# Patient Record
Sex: MALE | Race: WHITE | NOT HISPANIC OR LATINO | Employment: OTHER | URBAN - NONMETROPOLITAN AREA
[De-identification: names, ages, dates, MRNs, and addresses within clinical notes are randomized per-mention and may not be internally consistent; named-entity substitution may affect disease eponyms.]

---

## 2017-02-28 ENCOUNTER — CLINICAL SUPPORT (OUTPATIENT)
Dept: AUDIOLOGY | Facility: CLINIC | Age: 62
End: 2017-02-28

## 2017-02-28 ENCOUNTER — OFFICE VISIT (OUTPATIENT)
Dept: OTOLARYNGOLOGY | Facility: CLINIC | Age: 62
End: 2017-02-28

## 2017-02-28 VITALS — BODY MASS INDEX: 32.14 KG/M2 | HEIGHT: 66 IN | WEIGHT: 200 LBS

## 2017-02-28 DIAGNOSIS — H90.5 SENSORINEURAL HEARING LOSS OF RIGHT EAR: Primary | ICD-10-CM

## 2017-02-28 DIAGNOSIS — H90.3 ASYMMETRIC SNHL (SENSORINEURAL HEARING LOSS): Primary | ICD-10-CM

## 2017-02-28 DIAGNOSIS — H93.13 TINNITUS, BILATERAL: ICD-10-CM

## 2017-02-28 DIAGNOSIS — H61.21 IMPACTED CERUMEN OF RIGHT EAR: ICD-10-CM

## 2017-02-28 PROCEDURE — 69210 REMOVE IMPACTED EAR WAX UNI: CPT | Performed by: OTOLARYNGOLOGY

## 2017-02-28 PROCEDURE — 92557 COMPREHENSIVE HEARING TEST: CPT | Performed by: AUDIOLOGIST

## 2017-02-28 PROCEDURE — 99203 OFFICE O/P NEW LOW 30 MIN: CPT | Performed by: OTOLARYNGOLOGY

## 2017-02-28 PROCEDURE — 92567 TYMPANOMETRY: CPT | Performed by: AUDIOLOGIST

## 2017-02-28 NOTE — PROGRESS NOTES
STANDARD AUDIOMETRIC EVALUATION      Name:  Eliseo Infante  :  1955  Age:  61 y.o.  Date of Evaluation:  2017      HISTORY    Reason for visit:  Eliseo Infante is seen today for a hearing evaluation at the request of Dr. Braden Norwood.  Patient reports he lost hearing in his right ear several years ago due to a sudden hearing loss.  He has hears a low frequency hum in his right ear for years as well.  Recently, he has noticed a higher pitch sound like crickets in both ears.       EVALUATION    See Audiogram    RESULTS        Otoscopy and Tympanometry 226 Hz :  Right Ear:  Otoscopy:  Visible ear drum          Tympanometry:  Middle ear function within normal limits    Left Ear:   Otoscopy:  Clear ear canal        Tympanometry:  Middle ear function within normal limits    Test technique:  Standard Audiometry     Pure Tone Audiometry:   Patient responded to pure tones at  dB for 250-4000 Hz in right ear and at 5-35 dB for 250-8000 Hz in left ear.      Speech Audiometry:        Right Ear:  Speech Reception Threshold (SRT) was obtained at 75 dBHL, masked                 Speech Discrimination scores were 8% in masking noise when words were presented at 95 dBHL       Left Ear:  Speech Reception Threshold (SRT) was obtained at 10 dBHL                 Speech Discrimination scores were 100% in quiet when words were presented at 50 dBHL    Reliability:   good    IMPRESSIONS:  1.  Tympanometry results are consistent with Middle ear function within normal limits in both ears  2.  Pure tone results are consistent with mild to profound sloping sensorineural hearing loss  for right ear, and essentially within normal limits for left ear.        RECOMMENDATIONS:  Patient is seeing the Ear Nose and Throat physician immediately following this examination.  It was a pleasure seeing Eliseo Infante in Audiology today.  We would be happy to do further testing or discuss these test as necessary.          This  document has been electronically signed by Jennifer Cole MS CCC-A on February 28, 2017 5:08 PM       Jennifer Cole MS CCC-A  Licensed Audiologist

## 2017-02-28 NOTE — PROGRESS NOTES
Subjective   Eliseo Infante is a 61 y.o. male.     History of Present Illness   This is a patient have not seen in over 3 years.  He is status post nasal septoplasty and he states he is very pleased with the results of this.  He has a more remote history (greater than 20 years) history of right-sided hearing loss that was worked up elsewhere including an MRI scan and no etiology was found.  Returns today stating he's begun having some high-pitched ringing in the left ear more so than the right.  Has always had a low pitched noise in the right ear since he lost his hearing many years ago.  No otorrhea, no otalgia.  Nothing in particular seems to a broad this on.  Is worse in a quiet environment.      The following portions of the patient's history were reviewed and updated as appropriate: allergies, current medications, past family history, past medical history, past social history, past surgical history and problem list.      Eliseo Infante reports that he has quit smoking. He does not have any smokeless tobacco history on file.  Patient is not a tobacco user and has not been counseled for use of tobacco products    No family history on file.      Current Outpatient Prescriptions:   •  citalopram (CeleXA) 20 MG tablet, , Disp: , Rfl:   •  dextroamphetamine (DEXEDRINE SPANSULE) 15 MG 24 hr capsule, take 1 capsule by mouth twice a day, Disp: , Rfl: 0  •  latanoprost (XALATAN) 0.005 % ophthalmic solution, Administer 1 drop to both eyes Every Night., Disp: 2.5 mL, Rfl: 12  •  NAPROXEN  MG EC tablet, , Disp: , Rfl:   •  omeprazole (PriLOSEC) 40 MG capsule, , Disp: , Rfl: 0  •  ondansetron (ZOFRAN) 8 MG tablet, take 1 tablet by mouth three times a day if needed for nausea, Disp: , Rfl: 0  •  SUMAtriptan (IMITREX) 20 MG/ACT nasal spray, , Disp: , Rfl:   •  SUMAtriptan Succinate (IMITREX PO), Take  by mouth as needed., Disp: , Rfl:   •  verapamil PM (VERELAN PM) 360 MG 24 hr capsule, Take 360 mg by mouth  daily., Disp: , Rfl:       Past Medical History   Diagnosis Date   • High blood pressure    • Vitreous floater 05/24/2013         Review of Systems   Constitutional: Negative for fever.   HENT: Positive for hearing loss.    All other systems reviewed and are negative.          Objective   Physical Exam  General: Well-developed well-nourished male in no acute distress.  Alert and oriented ×3. Head: Normocephalic. Face: Symmetrical strength and appearance. PERRL. EOMI. Voice:Strong. Speech:Fluent  Ears: External ears no deformity, left ear canal shows no discharge.  Tympanic membrane intact and clear.  Right ear is occluded with cerumen.  Using the binocular microscope for visualization, cerumen impaction was removed from right ear canal(s) using instrumentation. This was personally performed by Braden Norwood MD   Following cerumen removal right tympanic membrane is noted be intact and clear  Nose: Nares show no discharge mass polyp or purulence.  Boggy mucosa is present.  No gross external deformity.  Septum: Midline  Oral cavity: Lips and gums without lesions.  Tongue and floor of mouth without lesions.  Parotid and submandibular ducts unobstructed.  No mucosal lesions on the buccal mucosa or vestibule of the mouth.  Pharynx: No erythema exudate mass or ulcer  Neck: No lymphadenopathy.  No thyromegaly.  Trachea and larynx midline.  No masses in the parotid or submandibular glands.    Audiogram is obtained and reviewed and shows a markedly asymmetrical sensorineural hearing loss with mild to profound loss on the right and an 8% discrimination score with a high frequency sensorineural loss at 6000 Hz only, on the left.  Discrimination is 100% on the left.  Tympanograms are type A.        Assessment/Plan   Eliseo was seen today for hearing loss.    Diagnoses and all orders for this visit:    Asymmetric SNHL (sensorineural hearing loss)    Impacted cerumen of right ear    Tinnitus, bilateral      Plan: Cerumen  removed as described above.  Explained the nature of tinnitus to the patient and its relationship to sensorineural hearing loss.  Advise use of masking noise as needed.  He should avoid unnecessary exposure loud noise use ear protection when unavoidably around loud noise and I advised return in a year with repeat audiogram call sooner for problems.

## 2017-06-01 ENCOUNTER — OFFICE VISIT (OUTPATIENT)
Dept: OPHTHALMOLOGY | Facility: CLINIC | Age: 62
End: 2017-06-01

## 2017-06-01 DIAGNOSIS — H11.153 PINGUECULA, BILATERAL: ICD-10-CM

## 2017-06-01 DIAGNOSIS — H26.9 CATARACT: ICD-10-CM

## 2017-06-01 DIAGNOSIS — H40.003 BORDERLINE GLAUCOMA, BILATERAL: Primary | ICD-10-CM

## 2017-06-01 PROCEDURE — 99213 OFFICE O/P EST LOW 20 MIN: CPT | Performed by: OPHTHALMOLOGY

## 2017-06-01 PROCEDURE — 92133 CPTRZD OPH DX IMG PST SGM ON: CPT | Performed by: OPHTHALMOLOGY

## 2017-06-01 NOTE — PROGRESS NOTES
Subjective   Eliseo Infante is a 61 y.o. male.   Chief Complaint   Patient presents with   • Glaucoma     Misses treatment infrequently less that 3 times per month.   Denies itching or burning or redness.      HPI     Glaucoma   In both eyes.  Side effects of treatment include growth of lashes. Additional comments: Misses treatment infrequently less that 3 times per month.   Denies itching or burning or redness.        Last edited by Luna Boland MD on 6/1/2017  9:39 AM. (History)          Review of Systems   Constitutional: Negative for chills and fever.   Respiratory: Negative for shortness of breath.    Cardiovascular: Negative for chest pain.   Gastrointestinal: Negative for abdominal pain.   Genitourinary: Negative for dysuria.   Musculoskeletal: Negative for myalgias.   Psychiatric/Behavioral: Negative for confusion.     The following portions of the patient’s history were reviewed and updated as appropriate: current medications, allergies, past medical and surgical history and social history.     Objective   Base Eye Exam     Visual Acuity (Snellen - Linear)      Right Left   Dist cc 20/25 20/50   Near cc J1 J1               Slit Lamp and Fundus Exam     External Exam      Right Left    External Normal Normal      Slit Lamp Exam      Right Left    Lids/Lashes Normal Normal    Conjunctiva/Sclera Pinguecula Pinguecula    Cornea Clear Clear    Anterior Chamber Deep and quiet Deep and quiet    Iris Round and reactive Round and reactive    Lens Nuclear sclerosis Nuclear sclerosis    Vitreous Normal Normal            Refraction     Wearing Rx      Sphere Cylinder Axis Add   Right -6.00 +0.50 039 +2.00   Left -6.75 +0.50 070 +2.00                 OCT Disc:   OD- superior thinning  OS- inferrior wedge thinning but overall full RNFL    Assessment/Plan   Diagnoses and all orders for this visit:    Borderline glaucoma, bilateral  Comments:  Patient is on Latanoprost OU for about 1 year. IOPs is 13 OU.  OCT  today shows some superior thinning OD but there is a defect as this was taken udilated. OS is relatively full and unchanged. IOPs are ok so continue current regimen. IF OCT remains stable consider stopping drops.     Cataract  Comments:  Not visually significant. Observe.     Pinguecula, bilateral  Comments:  AT QID OU.     Plan:       Return in about 6 months (around 12/1/2017) for Dilated exam, OCT discs.                            This document has been signed by Luna Boland MD on June 1, 2017 9:39 AM

## 2017-07-31 ENCOUNTER — OFFICE VISIT (OUTPATIENT)
Dept: ORTHOPEDIC SURGERY | Facility: CLINIC | Age: 62
End: 2017-07-31

## 2017-07-31 VITALS — BODY MASS INDEX: 31.34 KG/M2 | HEIGHT: 66 IN | WEIGHT: 195 LBS

## 2017-07-31 DIAGNOSIS — G56.03 BILATERAL CARPAL TUNNEL SYNDROME: ICD-10-CM

## 2017-07-31 DIAGNOSIS — M79.642 LEFT HAND PAIN: Primary | ICD-10-CM

## 2017-07-31 PROCEDURE — 99212 OFFICE O/P EST SF 10 MIN: CPT | Performed by: ORTHOPAEDIC SURGERY

## 2017-07-31 NOTE — PROGRESS NOTES
Eliseo Infante is a 61 y.o. male   Primary provider:  Manolo Sena MD       Chief Complaint   Patient presents with   • Left Hand - Establish Care       HISTORY OF PRESENT ILLNESS:    Hand Pain    Incident onset: for years. The incident occurred at home. Injury mechanism: playing piano. The pain is present in the left hand. Quality: sharp and dull. The pain does not radiate. The pain is moderate. The pain has been fluctuating since the incident. Associated symptoms include numbness and tingling. The symptoms are aggravated by movement. Treatments tried: stretching hand. The treatment provided mild relief.   Patient stated that he saw Dr. Baker about 10-12 years ago and he told him he had carpal tunnel but no electrical studies were done.  His symptomatology is consistent with carpal tunnel left hand only plan is bilateral nerve conduction studies for diagnostic purposes if approved carpal tunnel release we discussed the procedure being done in the hospital in the office and he prefers the office under local     CONCURRENT MEDICAL HISTORY:    Past Medical History:   Diagnosis Date   • High blood pressure    • Vitreous floater 05/24/2013       Allergies   Allergen Reactions   • Statins Other (See Comments)     Muscle pain         Current Outpatient Prescriptions:   •  citalopram (CeleXA) 20 MG tablet, , Disp: , Rfl:   •  dextroamphetamine (DEXEDRINE SPANSULE) 15 MG 24 hr capsule, take 1 capsule by mouth twice a day, Disp: , Rfl: 0  •  latanoprost (XALATAN) 0.005 % ophthalmic solution, Administer 1 drop to both eyes Every Night., Disp: 2.5 mL, Rfl: 12  •  NAPROXEN  MG EC tablet, , Disp: , Rfl:   •  ondansetron (ZOFRAN) 8 MG tablet, take 1 tablet by mouth three times a day if needed for nausea, Disp: , Rfl: 0  •  SUMAtriptan (IMITREX) 20 MG/ACT nasal spray, , Disp: , Rfl:   •  SUMAtriptan Succinate (IMITREX PO), Take  by mouth as needed., Disp: , Rfl:   •  verapamil PM (VERELAN PM) 360 MG 24 hr capsule,  "Take 360 mg by mouth daily., Disp: , Rfl:     Past Surgical History:   Procedure Laterality Date   • SEPTOPLASTY  12/18/2013    Nasal surgery procedure (Nasal septoplasty.)       Family History   Problem Relation Age of Onset   • Macular degeneration Mother    • Glaucoma Mother         Social History     Social History   • Marital status:      Spouse name: N/A   • Number of children: N/A   • Years of education: N/A     Occupational History   • Not on file.     Social History Main Topics   • Smoking status: Never Smoker   • Smokeless tobacco: Not on file   • Alcohol use Not on file   • Drug use: Not on file   • Sexual activity: Not on file     Other Topics Concern   • Not on file     Social History Narrative        Review of Systems   Musculoskeletal: Positive for joint swelling and neck pain.   Neurological: Positive for tingling, numbness and headaches.   All other systems reviewed and are negative.      PHYSICAL EXAMINATION:       Ht 66\" (167.6 cm)  Wt 195 lb (88.5 kg)  BMI 31.47 kg/m2    Physical Exam    GAIT:     [x]  Normal  []  Antalgic    Assistive device: [x]  None  []  Walker     []  Crutches  []  Cane     []  Wheelchair  []  Stretcher    Ortho Exam  his exam shows good muscle strength no evidence of deformity arthritis positive Tinel's at the wrist minimal no atrophy of the abductors    No results found.        ASSESSMENT:    Diagnoses and all orders for this visit:    Left hand pain          PLAN    No Follow-up on file.    Nathaniel Burns MD    "

## 2017-09-13 ENCOUNTER — OFFICE VISIT (OUTPATIENT)
Dept: ORTHOPEDIC SURGERY | Facility: CLINIC | Age: 62
End: 2017-09-13

## 2017-09-13 VITALS — WEIGHT: 201 LBS | HEIGHT: 66 IN | BODY MASS INDEX: 32.3 KG/M2

## 2017-09-13 DIAGNOSIS — M79.642 LEFT HAND PAIN: Primary | ICD-10-CM

## 2017-09-13 DIAGNOSIS — G56.03 BILATERAL CARPAL TUNNEL SYNDROME: ICD-10-CM

## 2017-09-13 PROCEDURE — 99213 OFFICE O/P EST LOW 20 MIN: CPT | Performed by: ORTHOPAEDIC SURGERY

## 2017-09-13 NOTE — PROGRESS NOTES
"Eliseo Infante is a 62 y.o. male returns for patient his EMG is positive for bilateral carpal tunnel syndrome but the right is asymptomatic so the left one is cemented symptomatic was to have this released and the office will get this precertified     Chief Complaint   Patient presents with   • Left Hand - Results     EMG Dr Tavarez  9/1/17       HISTORY OF PRESENT ILLNESS:  Left hand pain is worse than the right.  EMG Dr Tavarez 9/1/17.     CONCURRENT MEDICAL HISTORY:    Past Medical History:   Diagnosis Date   • High blood pressure    • Vitreous floater 05/24/2013       Allergies   Allergen Reactions   • Statins Other (See Comments)     Muscle pain         Current Outpatient Prescriptions:   •  citalopram (CeleXA) 20 MG tablet, , Disp: , Rfl:   •  dextroamphetamine (DEXEDRINE SPANSULE) 15 MG 24 hr capsule, take 1 capsule by mouth twice a day, Disp: , Rfl: 0  •  latanoprost (XALATAN) 0.005 % ophthalmic solution, Administer 1 drop to both eyes Every Night., Disp: 2.5 mL, Rfl: 12  •  NAPROXEN  MG EC tablet, , Disp: , Rfl:   •  ondansetron (ZOFRAN) 8 MG tablet, take 1 tablet by mouth three times a day if needed for nausea, Disp: , Rfl: 0  •  SUMAtriptan (IMITREX) 20 MG/ACT nasal spray, , Disp: , Rfl:   •  SUMAtriptan Succinate (IMITREX PO), Take  by mouth as needed., Disp: , Rfl:   •  verapamil PM (VERELAN PM) 360 MG 24 hr capsule, Take 360 mg by mouth daily., Disp: , Rfl:     Past Surgical History:   Procedure Laterality Date   • SEPTOPLASTY  12/18/2013    Nasal surgery procedure (Nasal septoplasty.)       ROS  No fevers or chills.  No chest pain or shortness of air.  No GI or  disturbances.    PHYSICAL EXAMINATION:       Ht 66\" (167.6 cm)  Wt 201 lb (91.2 kg)  BMI 32.44 kg/m2    Physical Exam    GAIT:     [x]  Normal  []  Antalgic    Assistive device: [x]  None  []  Walker     []  Crutches  []  Cane     []  Wheelchair  []  Stretcher    Ortho Exam positive Tinel's at the wrist full range of motion      EMG " Dr Jewell 9/1/17  Assessment:  Abnormal testing.  This test is most consistent with both upper extremity moderate stage carpal tunnel syndrome, right side more than left side with involvement of bilateral median motor sensory fiber at the level of the wrist.  There was no neurophysiologic evidence of bilateral upper extremity ulnar neuropathy.          ASSESSMENT:    Diagnoses and all orders for this visit:    Left hand pain    Bilateral carpal tunnel syndrome          PLAN    No Follow-up on file.    Nathaniel Burns MD

## 2018-04-06 ENCOUNTER — TELEPHONE (OUTPATIENT)
Dept: ORTHOPEDIC SURGERY | Facility: CLINIC | Age: 63
End: 2018-04-06

## 2019-04-08 RX ORDER — NAPROXEN 500 MG/1
500 TABLET ORAL 2 TIMES DAILY PRN
Qty: 60 TABLET | Refills: 2 | Status: SHIPPED | OUTPATIENT
Start: 2019-04-08 | End: 2019-05-30 | Stop reason: SDUPTHER

## 2019-05-28 DIAGNOSIS — M25.551 RIGHT HIP PAIN: Primary | ICD-10-CM

## 2019-05-30 ENCOUNTER — OFFICE VISIT (OUTPATIENT)
Dept: ORTHOPEDIC SURGERY | Facility: CLINIC | Age: 64
End: 2019-05-30

## 2019-05-30 VITALS — BODY MASS INDEX: 31.82 KG/M2 | HEIGHT: 66 IN | WEIGHT: 198 LBS

## 2019-05-30 DIAGNOSIS — M16.11 PRIMARY OSTEOARTHRITIS OF RIGHT HIP: Primary | ICD-10-CM

## 2019-05-30 DIAGNOSIS — M25.551 RIGHT HIP PAIN: ICD-10-CM

## 2019-05-30 PROCEDURE — 99214 OFFICE O/P EST MOD 30 MIN: CPT | Performed by: NURSE PRACTITIONER

## 2019-05-30 RX ORDER — NAPROXEN 500 MG/1
500 TABLET ORAL 2 TIMES DAILY PRN
Qty: 60 TABLET | Refills: 11 | Status: SHIPPED | OUTPATIENT
Start: 2019-05-30 | End: 2019-10-15 | Stop reason: HOSPADM

## 2019-05-30 NOTE — PROGRESS NOTES
Eliseo Infante is a 63 y.o. 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  He denies any previous injury.  He currently takes naproxen twice daily as needed for the discomfort which does improve his symptoms and has been performing yoga type exercises and is concerned that these may be the cause of his symptoms.    Pain   This is a new problem. Episode onset: 3 months. The problem occurs intermittently. The problem has been unchanged. The symptoms are aggravated by walking. He has tried NSAIDs for the symptoms. The treatment provided no relief.        CONCURRENT MEDICAL HISTORY:    Past Medical History:   Diagnosis Date   • High blood pressure    • Vitreous floater 05/24/2013       Allergies   Allergen Reactions   • Statins Other (See Comments)     Muscle pain         Current Outpatient Medications:   •  dextroamphetamine (DEXEDRINE SPANSULE) 15 MG 24 hr capsule, take 1 capsule by mouth twice a day, Disp: , Rfl: 0  •  latanoprost (XALATAN) 0.005 % ophthalmic solution, Administer 1 drop to both eyes Every Night., Disp: 2.5 mL, Rfl: 12  •  naproxen (EC NAPROSYN) 500 MG EC tablet, Take 1 tablet by mouth 2 (Two) Times a Day As Needed for Fever., Disp: 60 tablet, Rfl: 11  •  ondansetron (ZOFRAN) 8 MG tablet, take 1 tablet by mouth three times a day if needed for nausea, Disp: , Rfl: 0  •  SUMAtriptan (IMITREX) 20 MG/ACT nasal spray, , Disp: , Rfl:   •  verapamil PM (VERELAN PM) 360 MG 24 hr capsule, Take 360 mg by mouth daily., Disp: , Rfl:     Past Surgical History:   Procedure Laterality Date   • SEPTOPLASTY  12/18/2013    Nasal surgery procedure (Nasal septoplasty.)       Family History   Problem Relation Age of Onset   • Macular  "degeneration Mother    • Glaucoma Mother    • Heart disease Other    • Hypertension Other        Social History     Socioeconomic History   • Marital status:      Spouse name: Not on file   • Number of children: Not on file   • Years of education: Not on file   • Highest education level: Not on file   Tobacco Use   • Smoking status: Never Smoker        Review of Systems   All other systems reviewed and are negative.      PHYSICAL EXAMINATION:       Ht 167.6 cm (66\")   Wt 89.8 kg (198 lb)   BMI 31.96 kg/m²     Physical Exam   Constitutional: He is oriented to person, place, and time. Vital signs are normal. He appears well-developed and well-nourished. He is cooperative.   HENT:   Head: Normocephalic and atraumatic.   Neck: Trachea normal and phonation normal.   Pulmonary/Chest: Effort normal. No respiratory distress.   Abdominal: Soft. Normal appearance. He exhibits no distension.   Neurological: He is alert and oriented to person, place, and time. GCS eye subscore is 4. GCS verbal subscore is 5. GCS motor subscore is 6.   Skin: Skin is warm, dry and intact. Capillary refill takes less than 2 seconds.   Psychiatric: He has a normal mood and affect. His speech is normal and behavior is normal. Judgment and thought content normal. Cognition and memory are normal.   Vitals reviewed.      GAIT:     []  Normal  [x]  Antalgic    Assistive device: [x]  None  []  Walker     []  Crutches  []  Cane     []  Wheelchair  []  Stretcher    Right Hip Exam     Range of Motion   Abduction: abnormal   Flexion: abnormal   External rotation: abnormal   Internal rotation: abnormal     Muscle Strength   Abduction: 4/5   Adduction: 4/5   Flexion: 4/5     Other   Erythema: absent  Scars: absent  Sensation: normal  Pulse: present      Left Hip Exam   Left hip exam is normal.                  No results found.        ASSESSMENT:    Diagnoses and all orders for this visit:    Primary osteoarthritis of right hip    Right hip " pain    Other orders  -     naproxen (EC NAPROSYN) 500 MG EC tablet; Take 1 tablet by mouth 2 (Two) Times a Day As Needed for Fever.          PLAN  On review of the x-ray today there is progressive right hip osteoarthritis noted when compared to the left hip.  We discussed treatment for end-stage osteoarthritis of the right hip in detail and at this time the patient will continue to take his as needed dose of naproxen modify his activity continue his low impact exercises and follow-up as needed.  No Follow-up on file.    Brijesh Patterson, APRN     Ambulatory

## 2019-07-23 ENCOUNTER — OFFICE VISIT (OUTPATIENT)
Dept: ORTHOPEDIC SURGERY | Facility: CLINIC | Age: 64
End: 2019-07-23

## 2019-07-23 VITALS — WEIGHT: 204 LBS | BODY MASS INDEX: 32.78 KG/M2 | HEIGHT: 66 IN

## 2019-07-23 DIAGNOSIS — M16.11 PRIMARY OSTEOARTHRITIS OF RIGHT HIP: ICD-10-CM

## 2019-07-23 DIAGNOSIS — M25.551 RIGHT HIP PAIN: Primary | ICD-10-CM

## 2019-07-23 PROCEDURE — 99213 OFFICE O/P EST LOW 20 MIN: CPT | Performed by: ORTHOPAEDIC SURGERY

## 2019-07-23 NOTE — PROGRESS NOTES
"Eliseo Infante is a 63 y.o. male returns for     Chief Complaint   Patient presents with   • Right Hip - Follow-up       HISTORY OF PRESENT ILLNESS:Right hip pain.  Brijesh stated him on naproxen with some improvement. His pain score varies depending on what he is doing.   Occasional sharp stabbing pains.  He has consistent dull aches.  He has occasional sharp pains.  He is started using anti-inflammatory medicine which helps a little bit.  He has some difficulty putting on socks and shoes.  No specific injury and no numbness or tingling.       CONCURRENT MEDICAL HISTORY:    The following portions of the patient's history were reviewed and updated as appropriate: allergies, current medications, past family history, past medical history, past social history, past surgical history and problem list.     ROS  No fevers or chills.  No chest pain or shortness of air.  No GI or  disturbances.    PHYSICAL EXAMINATION:       Ht 167.6 cm (66\")   Wt 92.5 kg (204 lb)   BMI 32.93 kg/m²     Physical Exam   Constitutional: He is oriented to person, place, and time. He appears well-developed and well-nourished.   Neurological: He is alert and oriented to person, place, and time.   Psychiatric: He has a normal mood and affect. His behavior is normal. Judgment and thought content normal.       GAIT:     [x]  Normal  []  Antalgic    Assistive device: [x]  None  []  Walker     []  Crutches  []  Cane     []  Wheelchair  []  Stretcher    Right Hip Exam     Tenderness   The patient is experiencing no tenderness.     Range of Motion   Flexion: 110   External rotation: 20   Internal rotation: 0     Muscle Strength   Flexion: 4/5     Tests   BROOKLYNN: positive  Fadir:  Positive FADIR test    Other   Erythema: absent  Sensation: normal  Pulse: present                  Study Result     Ordering Provider:  Brijesh Patterson APRN  Ordering Diagnosis/Indication:  Right hip pain     Procedure:  XR HIP W OR WO PELVIS 2-3 VIEW RIGHT  Exam " Date:  5/30/19     COMPARISON:  Not applicable, no relevant images available.     IMPRESSION: AP of the pelvis with 2 views of the right hip reveals progressive degenerative changes of the right hip with no evidence of acute fracture, dislocation or other radiological abnormality noted.        Brijesh PÉREZCarolina Patterson, APRN  5/30/19           ASSESSMENT:    Diagnoses and all orders for this visit:    Right hip pain  -     Ambulatory Referral to Physical Therapy Evaluate and treat  -     CT Injection/Aspriation Large Joint or Bursa; Future    Primary osteoarthritis of right hip  -     Ambulatory Referral to Physical Therapy Evaluate and treat  -     CT Injection/Aspriation Large Joint or Bursa; Future          PLAN    Long discussion in regards to progression of disease expectations and surgical intervention.  We discussed treatment options leading up to surgical intervention.  He is going to use a cane in certain situations, begin physical therapy for range of motion and strengthening exercises for 2-3 visits.  We also discussed an intra-articular steroid injection.  Follow-up on an as-needed basis with eventual total hip arthroplasty.    Patient's Body mass index is 32.93 kg/m². BMI is above normal parameters. Recommendations include: exercise counseling and nutrition counseling.  Return if symptoms worsen or fail to improve, for recheck.    Nathaniel Kate MD

## 2019-07-29 ENCOUNTER — HOSPITAL ENCOUNTER (OUTPATIENT)
Dept: PHYSICAL THERAPY | Facility: HOSPITAL | Age: 64
Setting detail: THERAPIES SERIES
Discharge: HOME | End: 2019-07-29

## 2019-07-29 ENCOUNTER — HOSPITAL ENCOUNTER (OUTPATIENT)
Dept: CT IMAGING | Facility: HOSPITAL | Age: 64
Discharge: HOME | End: 2019-07-29
Admitting: RADIOLOGY

## 2019-07-29 VITALS
OXYGEN SATURATION: 97 % | HEART RATE: 71 BPM | DIASTOLIC BLOOD PRESSURE: 101 MMHG | TEMPERATURE: 98.6 F | RESPIRATION RATE: 18 BRPM | SYSTOLIC BLOOD PRESSURE: 204 MMHG

## 2019-07-29 DIAGNOSIS — M16.11 PRIMARY OSTEOARTHRITIS OF RIGHT HIP: ICD-10-CM

## 2019-07-29 DIAGNOSIS — M25.551 RIGHT HIP PAIN: Primary | ICD-10-CM

## 2019-07-29 DIAGNOSIS — M25.551 RIGHT HIP PAIN: ICD-10-CM

## 2019-07-29 PROCEDURE — 25010000002 TRIAMCINOLONE PER 10 MG: Performed by: RADIOLOGY

## 2019-07-29 PROCEDURE — 97110 THERAPEUTIC EXERCISES: CPT | Performed by: PHYSICAL THERAPIST

## 2019-07-29 PROCEDURE — 77012 CT SCAN FOR NEEDLE BIOPSY: CPT

## 2019-07-29 PROCEDURE — 97162 PT EVAL MOD COMPLEX 30 MIN: CPT | Performed by: PHYSICAL THERAPIST

## 2019-07-29 RX ORDER — TRIAMCINOLONE ACETONIDE 40 MG/ML
80 INJECTION, SUSPENSION INTRA-ARTICULAR; INTRAMUSCULAR ONCE
Status: COMPLETED | OUTPATIENT
Start: 2019-07-29 | End: 2019-07-29

## 2019-07-29 RX ORDER — BUPIVACAINE HYDROCHLORIDE 5 MG/ML
2 INJECTION, SOLUTION EPIDURAL; INTRACAUDAL ONCE
Status: COMPLETED | OUTPATIENT
Start: 2019-07-29 | End: 2019-07-29

## 2019-07-29 RX ADMIN — BUPIVACAINE HYDROCHLORIDE 2 ML: 5 INJECTION, SOLUTION EPIDURAL; INTRACAUDAL; PERINEURAL at 14:06

## 2019-07-29 RX ADMIN — TRIAMCINOLONE ACETONIDE 80 MG: 40 INJECTION, SUSPENSION INTRA-ARTICULAR; INTRAMUSCULAR at 14:09

## 2019-07-29 NOTE — PRE-PROCEDURE NOTE
Patient for right hip steroid injection. Procedure, risks , and benefits discussed with patient and patient  gave informed consent.  H&P dated 7/23/2019 reviewed with no changes.

## 2019-07-29 NOTE — THERAPY EVALUATION
Outpatient Physical Therapy Ortho Initial Evaluation  [facility]     Patient Name: Eliseo Infante  : 1955  MRN: [id number]  Today's Date: 2019      Visit Date: 2019  Visit   Return to MD: CONCEPCIÓN  Re-certification date: 19  Patient Active Problem List   Diagnosis   • Borderline glaucoma, open angle with borderline findings   • Borderline glaucoma   • Left hand pain   • Bilateral carpal tunnel syndrome   • Right hip pain   • Primary osteoarthritis of right hip        Past Medical History:   Diagnosis Date   • Arthritis    • Elevated cholesterol    • High blood pressure    • Vitreous floater 2013        Past Surgical History:   Procedure Laterality Date   • COLONOSCOPY     • RHINOPLASTY     • SEPTOPLASTY  2013    Nasal surgery procedure (Nasal septoplasty.)       Visit Dx:     ICD-10-CM ICD-9-CM   1. Right hip pain M25.551 719.45   2. Primary osteoarthritis of right hip M16.11 715.15       Medications (Admitted on 2019)     dextroamphetamine (DEXEDRINE SPANSULE) 15 MG 24 hr capsule     latanoprost (XALATAN) 0.005 % ophthalmic solution     naproxen (EC NAPROSYN) 500 MG EC tablet     ondansetron (ZOFRAN) 8 MG tablet     SUMAtriptan (IMITREX) 20 MG/ACT nasal spray     verapamil PM (VERELAN PM) 360 MG 24 hr capsule      Allergies: Statins      PT Ortho     Row Name 19 0850       Subjective Comments    Subjective Comments  62 yo male with 4 month h/o progressively worsening R hip pain, insidious onset. X-ray evidence of progressive R hip osteoarthritis. Has had skilled PT in the past for the R hip, around . Aggravating factors: using push mower, getting in/out of a car, rising from sitting, squatting, bending over, dressing tasks. Easing factors: Meds-naproxen   -BS       Precautions and Contraindications    Precautions/Limitations  no known precautions/limitations  -BS       Subjective Pain    Able to rate subjective pain?  yes  -BS    Pre-Treatment Pain Level   2  -BS    Post-Treatment Pain Level  1  -BS       General ROM    GENERAL ROM COMMENTS  AROM: R LE-hip flex 88° ext 2° abd 25° add 13° L hip AROM-flex 104° abd 32° add 20° ext 5°  -BS       MMT (Manual Muscle Testing)    General MMT Comments  MMT: R LE-hip flex 5/5 abd 4+/5 ext 4/5 knee flex 5/5 knee ext 5/5 ankle DF 5/5 L LE-5/5 except 4/5 hip ext  -BS       Sensation    Light Touch  No apparent deficits  -BS       Gait/Stairs Assessment/Training    Comment (Gait/Stairs)  indep with ambulation with non antalgic gait without an AD  -BS      User Key  (r) = Recorded By, (t) = Taken By, (c) = Cosigned By    Initials Name Provider Type    Sander Da Silva, PT Physical Therapist                      Therapy Education  Education Details: HEP: SLR x 3, SKC S w/ strap  Given: HEP, Symptoms/condition management, Pain management  Program: New  How Provided: Verbal, Demonstration, Written  Provided to: Patient  Level of Understanding: Teach back education performed     PT OP Goals     Row Name 07/29/19 0850 07/29/19 0800       PT Short Term Goals    STG Date to Achieve  08/19/19  -BS  --    STG 1  Pt indep with HEP  -BS  --    STG 1 Progress  New  -BS  --    STG 2  Improve R hip flex AROM to 105°  -BS  --    STG 2 Progress  New  -BS  --    STG 3  Improve R hip abd/ext MMT to 5/5  -BS  --    STG 3 Progress  New  -BS  --    STG 4  Reduce R hip pain by 75% with getting in/out of his car and rising from sitting  -BS  --    STG 4 Progress  New  -BS  --       Time Calculation    PT Goal Re-Cert Due Date  08/19/19  -BS  --  -BS      User Key  (r) = Recorded By, (t) = Taken By, (c) = Cosigned By    Initials Name Provider Type    Sander Da Silva, PT Physical Therapist          PT Assessment/Plan     Row Name 07/29/19 0850          PT Assessment    Functional Limitations  Impaired gait;Performance in leisure activities;Performance in sport activities;Performance in work activities  -BS     Impairments  Gait;Balance;Impaired  flexibility;Muscle strength;Pain;Range of motion  -BS     Assessment Comments  R hip pain due to primary osteoarthritis  -BS     Please refer to paper survey for additional self-reported information  Yes  -BS     Rehab Potential  Good  -BS     Patient/caregiver participated in establishment of treatment plan and goals  Yes  -BS     Patient would benefit from skilled therapy intervention  Yes  -BS        PT Plan    PT Frequency  1x/week;2x/week  -BS     Predicted Duration of Therapy Intervention (Therapy Eval)  2 weeks, 3 visits total  -BS     Planned CPT's?  PT EVAL LOW COMPLEXITY: 42573;PT RE-EVAL: 54057;PT THER PROC EA 15 MIN: 88982;PT THER ACT EA 15 MIN: 54674;PT MANUAL THERAPY EA 15 MIN: 94239;PT NEUROMUSC RE-EDUCATION EA 15 MIN: 44252;PT ELECTRICAL STIM UNATTEND: ;PT HOT/COLD PACK WC NONMCARE: 07854;PT THER SUPP EA 15 MIN  -BS     Physical Therapy Interventions (Optional Details)  balance training;home exercise program;joint mobilization;lumbar stabilization;manual therapy techniques;modalities;neuromuscular re-education;patient/family education;ROM (Range of Motion);stretching;strengthening;stair training;transfer training  -BS     PT Plan Comments  f/u with R hip strengthening, improve R hip ROM. Add fwd lunge S at steps, hip hikes  -BS       User Key  (r) = Recorded By, (t) = Taken By, (c) = Cosigned By    Initials Name Provider Type    Sander Da Silva, PT Physical Therapist          Modalities     Row Name 07/29/19 0850             Ice    Ice Applied  Yes  -BS      Location  R hip  -BS      Rx Minutes  10 mins  -BS      Ice S/P Rx  Yes  -BS        User Key  (r) = Recorded By, (t) = Taken By, (c) = Cosigned By    Initials Name Provider Type    Sander Da Silva, PT Physical Therapist        Exercises     Row Name 07/29/19 0850             Subjective Comments    Subjective Comments  64 yo male with 4 month h/o progressively worsening R hip pain, insidious onset. X-ray evidence of progressive R hip  "osteoarthritis. Has had skilled PT in the past for the R hip, around 2001. Aggravating factors: using push mower, getting in/out of a car, rising from sitting, squatting, bending over, dressing tasks. Easing factors: Meds-naproxen   -BS         Subjective Pain    Able to rate subjective pain?  yes  -BS      Pre-Treatment Pain Level  2  -BS      Post-Treatment Pain Level  1  -BS         Exercise 1    Exercise Name 1  SLR x 3  -BS      Sets 1  1  -BS      Reps 1  10 ea  -BS         Exercise 2    Exercise Name 2  SKC S w/ strap  -BS      Sets 2  1  -BS      Reps 2  2  -BS      Time 2  30\" 30\" hold  -BS      Additional Comments  R only  -BS        User Key  (r) = Recorded By, (t) = Taken By, (c) = Cosigned By    Initials Name Provider Type    Sander Da Silva, PT Physical Therapist                        Outcome Measure Options: Lower Extremity Functional Scale (LEFS)  Lower Extremity Functional Index  Any of your usual work, housework or school activities: A little bit of difficulty  Your usual hobbies, recreational or sporting activities: A little bit of difficulty  Getting into or out of the bath: Moderate difficulty  Walking between rooms: No difficulty  Putting on your shoes or socks: Moderate difficulty  Squatting: A little bit of difficulty  Lifting an object, like a bag of groceries from the floor: A little bit of difficulty  Performing light activities around your home: No difficulty  Performing heavy activities around your home: A little bit of difficulty  Getting into or out of a car: A little bit of difficulty  Walking 2 blocks: No difficulty  Walking a mile: Moderate difficulty  Going up or down 10 stairs (about 1 flight of stairs): A little bit of difficulty  Standing for 1 hour: A little bit of difficulty  Sitting for 1 hour: No difficulty  Running on even ground: Moderate difficulty  Running on uneven ground: Moderate difficulty  Making sharp turns while running fast: Moderate difficulty  Hopping: A " little bit of difficulty  Rolling over in bed: No difficulty  Total: 59      Time Calculation:     Start Time: 0850  Stop Time: 0938  Time Calculation (min): 48 min  PT Non-Billable Time (min): 10 min  Total Timed Code Minutes- PT: 38 minute(s)     Therapy Charges for Today     Code Description Service Date Service Provider Modifiers Qty    75154334228 HC PT EVAL MOD COMPLEXITY 2 7/29/2019 Sander Gay, PT GP 1    19127578387 HC PT THER PROC EA 15 MIN 7/29/2019 Sander Gay, PT GP 1    57367614686 HC PT THER SUPP EA 15 MIN 7/29/2019 Sander Gay, PT GP 1          PT G-Codes  Outcome Measure Options: Lower Extremity Functional Scale (LEFS)  Total: 59         Sander Gay PT  7/29/2019

## 2019-08-06 ENCOUNTER — HOSPITAL ENCOUNTER (OUTPATIENT)
Dept: PHYSICAL THERAPY | Facility: HOSPITAL | Age: 64
Setting detail: THERAPIES SERIES
Discharge: HOME | End: 2019-08-06

## 2019-08-06 DIAGNOSIS — M25.551 RIGHT HIP PAIN: Primary | ICD-10-CM

## 2019-08-06 DIAGNOSIS — M16.11 PRIMARY OSTEOARTHRITIS OF RIGHT HIP: ICD-10-CM

## 2019-08-06 PROCEDURE — 97110 THERAPEUTIC EXERCISES: CPT

## 2019-08-06 NOTE — THERAPY TREATMENT NOTE
"    Outpatient Physical Therapy Ortho Treatment Note  [facility]     Patient Name: Eliseo Infante  : 1955  MRN: [id number]  Today's Date: 2019      Visit Date: 2019    Subjective Improvement \"a lot\"  Visits 2/2  Visits approved 2-3 visits per MD order  RTMD PRN  Recert Date 2019    Right hip pain    Visit Dx:    ICD-10-CM ICD-9-CM   1. Right hip pain M25.551 719.45   2. Primary osteoarthritis of right hip M16.11 715.15       Patient Active Problem List   Diagnosis   • Borderline glaucoma, open angle with borderline findings   • Borderline glaucoma   • Left hand pain   • Bilateral carpal tunnel syndrome   • Right hip pain   • Primary osteoarthritis of right hip        Past Medical History:   Diagnosis Date   • Arthritis    • Elevated cholesterol    • High blood pressure    • Vitreous floater 2013        Past Surgical History:   Procedure Laterality Date   • COLONOSCOPY     • RHINOPLASTY     • SEPTOPLASTY  2013    Nasal surgery procedure (Nasal septoplasty.)                       PT Assessment/Plan     Row Name 19 1559          PT Assessment    Assessment Comments  Patient tolerated ther ex without any pain.  One goal met this date.    Patient should be ready to be D/C at next visit  -CP        PT Plan    PT Frequency  2x/week  -CP     Predicted Duration of Therapy Intervention (Therapy Eval)  2-3 visits  -CP     PT Plan Comments  D/C to home and one free month fitness membership.    -CP       User Key  (r) = Recorded By, (t) = Taken By, (c) = Cosigned By    Initials Name Provider Type    CP Adwoa May PTA Physical Therapy Assistant            Exercises     Row Name 19 1300             Subjective Comments    Subjective Comments  right hip feels great since having the injection.   -CP         Subjective Pain    Able to rate subjective pain?  yes  -CP      Pre-Treatment Pain Level  0  -CP      Post-Treatment Pain Level  0  -CP         Exercise 1    Exercise " "Name 1  Pro II level 2  -CP      Reps 1  10  -CP         Exercise 2    Exercise Name 2  incline stretch  -CP      Cueing 2  Verbal;Demo  -CP      Sets 2  3  -CP      Time 2  30  -CP         Exercise 3    Exercise Name 3  standing HS stretch  -CP      Cueing 3  Verbal  -CP      Sets 3  3  -CP      Time 3  30  -CP         Exercise 4    Exercise Name 4  step up 6\"  -CP      Cueing 4  Verbal;Demo  -CP      Sets 4  2  -CP      Reps 4  10  -CP         Exercise 5    Exercise Name 5  lat step up 4\"  -CP      Cueing 5  Verbal;Demo  -CP      Sets 5  2  -CP      Reps 5  10  -CP         Exercise 6    Exercise Name 6  standing Hip AB  -CP      Cueing 6  Verbal;Demo  -CP      Reps 6  20  -CP      Time 6  bialteral  -CP         Exercise 7    Exercise Name 7  seated shin slides  -CP      Cueing 7  Verbal;Demo  -CP      Sets 7  1  -CP      Reps 7  10  -CP      Time 7  --  -CP      Additional Comments  --  -CP         Exercise 8    Exercise Name 8  hooklying heelsides  -CP      Cueing 8  Verbal;Tactile  -CP      Reps 8  20  -CP         Exercise 9    Exercise Name 9  hooklying BKFO  -CP      Cueing 9  Verbal;Tactile  -CP      Reps 9  20  -CP         Exercise 10    Exercise Name 10  cybex hip AB  -CP      Cueing 10  Verbal;Demo  -CP      Sets 10  2  -CP      Reps 10  10  -CP      Time 10  30 lb  -CP         Exercise 11    Exercise Name 11  cybex leg press  -CP      Cueing 11  Verbal;Demo  -CP      Sets 11  2  -CP      Reps 11  10  -CP      Time 11  75 lb  -CP        User Key  (r) = Recorded By, (t) = Taken By, (c) = Cosigned By    Initials Name Provider Type    Adwoa Kennedy, PTA Physical Therapy Assistant                       PT OP Goals     Row Name 08/06/19 1500          PT Short Term Goals    STG Date to Achieve  08/19/19  -CP     STG 1  Pt indep with HEP  -CP     STG 1 Progress  Ongoing  -CP     STG 2  Improve R hip flex AROM to 105°  -CP     STG 2 Progress  Progressing  -CP     STG 3  Improve R hip abd/ext MMT to 5/5  -CP "     STG 3 Progress  Progressing  -CP     STG 4  Reduce R hip pain by 75% with getting in/out of his car and rising from sitting  -CP     STG 4 Progress  Met  -CP        Time Calculation    PT Goal Re-Cert Due Date  08/19/19  -CP       User Key  (r) = Recorded By, (t) = Taken By, (c) = Cosigned By    Initials Name Provider Type    CP Adwoa May, PTA Physical Therapy Assistant          Therapy Education  Education Details: hooklying heelslides, and BKFO  Given: HEP  Program: New  How Provided: Verbal, Demonstration  Provided to: Patient  Level of Understanding: Teach back education performed, Verbalized, Demonstrated              Time Calculation:   Start Time: 1346  Stop Time: 1430  Time Calculation (min): 44 min  Total Timed Code Minutes- PT: 44 minute(s)  Therapy Charges for Today     Code Description Service Date Service Provider Modifiers Qty    68339038691 HC PT THER PROC EA 15 MIN 8/6/2019 Adwoa May, PTA GP 3                    Adwoa May PTA  8/6/2019

## 2019-08-08 ENCOUNTER — HOSPITAL ENCOUNTER (OUTPATIENT)
Dept: PHYSICAL THERAPY | Facility: HOSPITAL | Age: 64
Setting detail: THERAPIES SERIES
Discharge: HOME | End: 2019-08-08

## 2019-08-08 DIAGNOSIS — M16.11 PRIMARY OSTEOARTHRITIS OF RIGHT HIP: ICD-10-CM

## 2019-08-08 DIAGNOSIS — M25.551 RIGHT HIP PAIN: Primary | ICD-10-CM

## 2019-08-08 PROCEDURE — 97110 THERAPEUTIC EXERCISES: CPT

## 2019-08-08 NOTE — THERAPY DISCHARGE NOTE
Outpatient Physical Therapy Ortho Treatment Note/Discharge Summary  [facility]     Patient Name: Eliseo Infante  : 1955  MRN: [id number]  Today's Date: 2019      Visit Date: 2019     Subjective Improvement 100%  Visits 3/3  Visits approved 2-3 visits per MD      Visit Dx:    ICD-10-CM ICD-9-CM   1. Right hip pain M25.551 719.45   2. Primary osteoarthritis of right hip M16.11 715.15       Patient Active Problem List   Diagnosis   • Borderline glaucoma, open angle with borderline findings   • Borderline glaucoma   • Left hand pain   • Bilateral carpal tunnel syndrome   • Right hip pain   • Primary osteoarthritis of right hip        Past Medical History:   Diagnosis Date   • Arthritis    • Elevated cholesterol    • High blood pressure    • Vitreous floater 2013        Past Surgical History:   Procedure Laterality Date   • COLONOSCOPY     • RHINOPLASTY     • SEPTOPLASTY  2013    Nasal surgery procedure (Nasal septoplasty.)       PT Ortho     Row Name 19 1500       General ROM    GENERAL ROM COMMENTS  right hip fl 105  -CP      User Key  (r) = Recorded By, (t) = Taken By, (c) = Cosigned By    Initials Name Provider Type    Adwoa Kennedy, JANESSA Physical Therapy Assistant                      PT Assessment/Plan     Row Name 19 1516          PT Assessment    Assessment Comments  Patient reports no pain in right hip with any activity which seems to be contributed to recent injection.  He does have tight hip rotation with the inablitly to cross his legs.    -CP        PT Plan    PT Plan Comments  D/C to home with hep and one month free fitness membership  -CP       User Key  (r) = Recorded By, (t) = Taken By, (c) = Cosigned By    Initials Name Provider Type    Adwoa Kennedy PTA Physical Therapy Assistant              Exercises     Row Name 19 1500             Subjective Comments    Subjective Comments  Patient reports no nile and that he is doing great.   However, he is still unable to cross his legs  -CP         Subjective Pain    Able to rate subjective pain?  yes  -CP      Pre-Treatment Pain Level  0  -CP      Post-Treatment Pain Level  0  -CP         Exercise 1    Exercise Name 1  Pro II level 3  -CP      Reps 1  10  -CP         Exercise 2    Exercise Name 2  incline stretch  -CP      Cueing 2  Verbal  -CP      Sets 2  3  -CP      Time 2  30  -CP         Exercise 3    Exercise Name 3  standing HS stretch  -CP      Cueing 3  Verbal  -CP      Sets 3  3  -CP      Time 3  30  -CP         Exercise 4    Exercise Name 4  standing lunge stretch  -CP      Cueing 4  Verbal  -CP      Sets 4  3  -CP      Time 4  30  -CP         Exercise 5    Exercise Name 5  supine right piriformis stretch  -CP      Cueing 5  Verbal;Tactile  -CP      Sets 5  3  -CP      Time 5  30  -CP         Exercise 6    Exercise Name 6  cybex hip AB  -CP      Cueing 6  Verbal  -CP      Sets 6  3  -CP      Reps 6  10  -CP      Time 6  30 lb  -CP         Exercise 7    Exercise Name 7  cybex hip AB  -CP      Cueing 7  Verbal  -CP      Sets 7  3  -CP      Reps 7  10  -CP      Time 7  35 lb  -CP         Exercise 8    Exercise Name 8  cybex ham curl  -CP      Cueing 8  Verbal  -CP      Sets 8  3  -CP      Reps 8  10  -CP      Time 8  50 lb  -CP         Exercise 9    Exercise Name 9  cybex leg press  -CP      Cueing 9  Verbal  -CP      Sets 9  3  -CP      Reps 9  10  -CP      Time 9  80 lb  -CP        User Key  (r) = Recorded By, (t) = Taken By, (c) = Cosigned By    Initials Name Provider Type    CP Adwoa May, PTA Physical Therapy Assistant                         PT OP Goals     Row Name 08/08/19 1500          PT Short Term Goals    STG Date to Achieve  08/19/19  -CP     STG 1  Pt indep with HEP  -CP     STG 1 Progress  Met  -CP     STG 2  Improve R hip flex AROM to 105°  -CP     STG 2 Progress  Met  -CP     STG 3  Improve R hip abd/ext MMT to 5/5  -CP     STG 3 Progress  Not Met  -CP     STG 4  Reduce  R hip pain by 75% with getting in/out of his car and rising from sitting  -CP     STG 4 Progress  Met  -CP        Time Calculation    PT Goal Re-Cert Due Date  08/19/19  -CP       User Key  (r) = Recorded By, (t) = Taken By, (c) = Cosigned By    Initials Name Provider Type    CP Adwoa May PTA Physical Therapy Assistant          Therapy Education  Education Details: cybex hip ab/ad, ham curl leg press  Given: HEP  Program: New  How Provided: Verbal, Demonstration  Provided to: Patient  Level of Understanding: Teach back education performed, Verbalized, Demonstrated              Time Calculation:   Start Time: 1345  Stop Time: 1425  Time Calculation (min): 40 min  Total Timed Code Minutes- PT: 40 minute(s)  Therapy Charges for Today     Code Description Service Date Service Provider Modifiers Qty    36231120563 HC PT THER PROC EA 15 MIN 8/8/2019 Adwoa May PTA GP 3                OP PT Discharge Summary  Date of Discharge: 08/08/19  Reason for Discharge: Patient/Caregiver request  Outcomes Achieved: Patient able to partially acheive established goals  Discharge Destination: Home with home program      Adwoa May PTA  8/8/2019

## 2019-09-19 ENCOUNTER — TELEPHONE (OUTPATIENT)
Dept: ORTHOPEDIC SURGERY | Facility: CLINIC | Age: 64
End: 2019-09-19

## 2019-09-19 NOTE — TELEPHONE ENCOUNTER
QUETA GONZALES CALLED AND IS REQUESTING TO HAVE SOMETHING CALLED IN TO HELP WITH THE PAIN HE IS EXPERIENCING

## 2019-09-20 NOTE — TELEPHONE ENCOUNTER
He can take 2 extra strength tylenol every 6 hours for pain.  If he is still hurting then he needs to use crutches or a walker.  If still hurting then he needs to be re-evaluated.

## 2019-09-26 ENCOUNTER — OFFICE VISIT (OUTPATIENT)
Dept: ORTHOPEDIC SURGERY | Facility: CLINIC | Age: 64
End: 2019-09-26

## 2019-09-26 VITALS — WEIGHT: 193 LBS | BODY MASS INDEX: 31.02 KG/M2 | HEIGHT: 66 IN

## 2019-09-26 DIAGNOSIS — I10 ESSENTIAL HYPERTENSION: ICD-10-CM

## 2019-09-26 DIAGNOSIS — M16.11 PRIMARY OSTEOARTHRITIS OF RIGHT HIP: Primary | ICD-10-CM

## 2019-09-26 DIAGNOSIS — M25.551 RIGHT HIP PAIN: ICD-10-CM

## 2019-09-26 PROCEDURE — 99214 OFFICE O/P EST MOD 30 MIN: CPT | Performed by: ORTHOPAEDIC SURGERY

## 2019-09-26 NOTE — PROGRESS NOTES
Eliseo Infante is a 64 y.o. male returns for     Chief Complaint   Patient presents with   • Right Hip - Follow-up, Pain       HISTORY OF PRESENT ILLNESS: f/u right hip pain. Patient would like to discuss surgery options. Injection under fluoro done on 7/29/2019, last couple of weeks.   Patient has had intra-articular steroid injection without significant improvement.  He has tried activity modification, use of a cane, as well as home exercise program.  He has taken anti-inflammatory medicines without significant improvement.  He has pain with activities of daily living.  He has pain with attempted increase in activity.  No numbness or tingling.  His pain wakes him up at night and he has to change locations.  He moves from his bed to his chair and to the couch intermittently.  Pain is slowly been worsening over the last several months.  He is unhappy with his quality of life and wants to proceed with surgical intervention.       CONCURRENT MEDICAL HISTORY:    Past Medical History:   Diagnosis Date   • Arthritis    • Elevated cholesterol    • High blood pressure    • Vitreous floater 05/24/2013       Allergies   Allergen Reactions   • Statins Other (See Comments)     Muscle pain       Current Outpatient Medications on File Prior to Visit   Medication Sig   • dextroamphetamine (DEXEDRINE SPANSULE) 15 MG 24 hr capsule take 1 capsule by mouth twice a day   • latanoprost (XALATAN) 0.005 % ophthalmic solution Administer 1 drop to both eyes Every Night.   • naproxen (EC NAPROSYN) 500 MG EC tablet Take 1 tablet by mouth 2 (Two) Times a Day As Needed for Fever.   • ondansetron (ZOFRAN) 8 MG tablet take 1 tablet by mouth three times a day if needed for nausea   • SUMAtriptan (IMITREX) 20 MG/ACT nasal spray    • verapamil PM (VERELAN PM) 360 MG 24 hr capsule Take 360 mg by mouth daily.     No current facility-administered medications on file prior to visit.        Past Surgical History:   Procedure Laterality Date   •  "COLONOSCOPY     • RHINOPLASTY     • SEPTOPLASTY  12/18/2013    Nasal surgery procedure (Nasal septoplasty.)       Family History   Problem Relation Age of Onset   • Macular degeneration Mother    • Glaucoma Mother    • Heart disease Other    • Hypertension Other        Social History     Socioeconomic History   • Marital status:      Spouse name: Not on file   • Number of children: Not on file   • Years of education: Not on file   • Highest education level: Not on file   Tobacco Use   • Smoking status: Never Smoker   • Smokeless tobacco: Never Used   Substance and Sexual Activity   • Alcohol use: Yes     Frequency: Never     Comment: occasional use   • Drug use: No     '     ROS  No fevers or chills.  No chest pain or shortness of air.  No GI or  disturbances.  Other than right hip pain, all other systems reviewed as negative.    PHYSICAL EXAMINATION:       Ht 167.6 cm (66\")   Wt 87.5 kg (193 lb)   BMI 31.15 kg/m²     Physical Exam   Constitutional: He is oriented to person, place, and time. He appears well-developed and well-nourished. No distress.   Cardiovascular: Normal rate, regular rhythm and normal heart sounds.   Pulmonary/Chest: Effort normal and breath sounds normal.   Abdominal: Soft. Bowel sounds are normal.   Neurological: He is alert and oriented to person, place, and time.   Skin: Capillary refill takes less than 2 seconds.   Psychiatric: He has a normal mood and affect. His behavior is normal. Judgment and thought content normal.   Vitals reviewed.      GAIT:     []  Normal  []  Antalgic    Assistive device: [x]  None  []  Walker     []  Crutches  []  Cane     []  Wheelchair  []  Stretcher    Right Hip Exam     Tenderness   Right hip tenderness location: Diffuse tenderness.    Range of Motion   External rotation: 20   Right hip internal rotation: -5.     Muscle Strength   Flexion: 4/5     Tests   BROOKLYNN: positive  Fadir:  Positive FADIR test    Other   Erythema: absent  Sensation: " normal  Pulse: present      Left Hip Exam     Tenderness   The patient is experiencing no tenderness.     Range of Motion   The patient has normal left hip ROM.    Muscle Strength   The patient has normal left hip strength.     Tests   BROOKLYNN: negative  Fadir:  Negative FADIR test    Other   Erythema: absent  Sensation: normal  Pulse: present                Study Result     Ordering Provider:  Brijesh Patterson APRN  Ordering Diagnosis/Indication:  Right hip pain     Procedure:  XR HIP W OR WO PELVIS 2-3 VIEW RIGHT  Exam Date:  5/30/19     COMPARISON:  Not applicable, no relevant images available.     IMPRESSION: AP of the pelvis with 2 views of the right hip reveals progressive degenerative changes of the right hip with no evidence of acute fracture, dislocation or other radiological abnormality noted.        JOANIE Cortés  5/30/19              Procedure: CT directed right hip steroid injection     Reason for exam: Right hip pain. Primary osteoarthritis.     FINDINGS: Patient presents for CT directed right hip steroid  injection. Procedure, risks, and benefits were explained to the  patient and he gave informed written consent. Axial computer  tomography sequential imaging was performed of the right hip to  find appropriate access point for right hip steroid injection.  This exam was performed according to our departmental dose  optimization program, which includes automated exposure control,  adjustment of the mA and/or KV according to patient size and/or  use of iterative reconstruction technique.  Appropriate access  point was identified and the skin surface was marked. The patient  was then sterilely prepped and draped and locally anesthetized  lidocaine. Utilizing CT guidance a 25-gauge spinal needle was  placed into the right hip joint space. 2 cc of Marcaine and 80 mg  of Kenalog were administered into the right hip joint space. The  spinal needle was then removed. Patient tolerated procedure  well.  No immediate complications.     IMPRESSION:  Successful CT directed right hip steroid injection.     Electronically signed by:  Sander Cavanaugh MD  7/29/2019 2:58 PM CDT  Workstation: [id number]      ASSESSMENT:    Diagnoses and all orders for this visit:    Primary osteoarthritis of right hip  -     Case Request; Standing  -     Type and screen; Future  -     Tranexamic Acid 1,000 mg in sodium chloride 0.9 % 100 mL  -     Tranexamic Acid 1,000 mg in sodium chloride 0.9 % 100 mL  -     ceFAZolin (ANCEF) 2 g in sodium chloride 0.9 % 100 mL IVPB  -     acetaminophen (TYLENOL) tablet 975 mg  -     meloxicam (MOBIC) tablet 15 mg  -     oxyCODONE (oxyCONTIN) 12 hr tablet 10 mg  -     MRSA Screen, PCR - Swab, Nares; Future  -     Case Request    Right hip pain  -     Case Request; Standing  -     Type and screen; Future  -     Tranexamic Acid 1,000 mg in sodium chloride 0.9 % 100 mL  -     Tranexamic Acid 1,000 mg in sodium chloride 0.9 % 100 mL  -     ceFAZolin (ANCEF) 2 g in sodium chloride 0.9 % 100 mL IVPB  -     acetaminophen (TYLENOL) tablet 975 mg  -     meloxicam (MOBIC) tablet 15 mg  -     oxyCODONE (oxyCONTIN) 12 hr tablet 10 mg  -     MRSA Screen, PCR - Swab, Nares; Future  -     Case Request    Essential hypertension  -     Case Request; Standing  -     Type and screen; Future  -     Tranexamic Acid 1,000 mg in sodium chloride 0.9 % 100 mL  -     Tranexamic Acid 1,000 mg in sodium chloride 0.9 % 100 mL  -     ceFAZolin (ANCEF) 2 g in sodium chloride 0.9 % 100 mL IVPB  -     acetaminophen (TYLENOL) tablet 975 mg  -     meloxicam (MOBIC) tablet 15 mg  -     oxyCODONE (oxyCONTIN) 12 hr tablet 10 mg  -     MRSA Screen, PCR - Swab, Nares; Future  -     Case Request    Other orders  -     Inpatient Admission; Standing  -     Follow Anesthesia Guidelines / Standing Orders; Future  -     Provide instructions to patient regarding NPO status  -     Follow Anesthesia Guidelines / Standing Orders; Standing  -     Verify  NPO Status; Standing  -     POC Glucose Once; Standing  -     Clip operative site; Standing  -     Obtain informed consent (if not collected inpatient or PAT); Standing  -     Radiology Technician to Be Present for Intra-Op C-Arm Use; Standing  -     NPO After Midnight          PLAN  Patient's Body mass index is 31.15 kg/m². BMI is above normal parameters. Recommendations include: exercise counseling and nutrition counseling.    Return for Post-operative eval.    Nathaniel Kate MD

## 2019-09-27 PROBLEM — I10 ESSENTIAL HYPERTENSION: Status: ACTIVE | Noted: 2019-09-27

## 2019-09-27 RX ORDER — MELOXICAM 15 MG/1
15 TABLET ORAL ONCE
Status: CANCELLED | OUTPATIENT
Start: 2019-10-14 | End: 2019-09-27

## 2019-09-27 RX ORDER — OXYCODONE HCL 10 MG/1
10 TABLET, FILM COATED, EXTENDED RELEASE ORAL ONCE
Status: CANCELLED | OUTPATIENT
Start: 2019-10-14 | End: 2019-09-27

## 2019-09-27 RX ORDER — ACETAMINOPHEN 500 MG
1000 TABLET ORAL ONCE
Status: CANCELLED | OUTPATIENT
Start: 2019-10-14 | End: 2019-09-27

## 2019-09-27 RX ORDER — BUPIVACAINE HCL/0.9 % NACL/PF 0.1 %
2 PLASTIC BAG, INJECTION (ML) EPIDURAL ONCE
Status: CANCELLED | OUTPATIENT
Start: 2019-10-14 | End: 2019-09-27

## 2019-10-09 ENCOUNTER — APPOINTMENT (OUTPATIENT)
Dept: PREADMISSION TESTING | Facility: HOSPITAL | Age: 64
End: 2019-10-09

## 2019-10-09 VITALS
HEIGHT: 66 IN | SYSTOLIC BLOOD PRESSURE: 172 MMHG | BODY MASS INDEX: 30.7 KG/M2 | RESPIRATION RATE: 20 BRPM | OXYGEN SATURATION: 99 % | DIASTOLIC BLOOD PRESSURE: 100 MMHG | WEIGHT: 191 LBS | HEART RATE: 77 BPM

## 2019-10-09 DIAGNOSIS — I10 ESSENTIAL HYPERTENSION: ICD-10-CM

## 2019-10-09 DIAGNOSIS — M25.551 RIGHT HIP PAIN: ICD-10-CM

## 2019-10-09 DIAGNOSIS — M16.11 PRIMARY OSTEOARTHRITIS OF RIGHT HIP: ICD-10-CM

## 2019-10-09 LAB
ABO GROUP BLD: NORMAL
BLD GP AB SCN SERPL QL: NEGATIVE
DEPRECATED RDW RBC AUTO: 40.1 FL (ref 37–54)
ERYTHROCYTE [DISTWIDTH] IN BLOOD BY AUTOMATED COUNT: 13.1 % (ref 12.3–15.4)
HCT VFR BLD AUTO: 47.1 % (ref 37.5–51)
HGB BLD-MCNC: 16.5 G/DL (ref 13–17.7)
Lab: NORMAL
MCH RBC QN AUTO: 29.4 PG (ref 26.6–33)
MCHC RBC AUTO-ENTMCNC: 35 G/DL (ref 31.5–35.7)
MCV RBC AUTO: 84 FL (ref 79–97)
MRSA DNA SPEC QL NAA+PROBE: NEGATIVE
PLATELET # BLD AUTO: 260 10*3/MM3 (ref 140–450)
PMV BLD AUTO: 11.7 FL (ref 6–12)
RBC # BLD AUTO: 5.61 10*6/MM3 (ref 4.14–5.8)
RH BLD: NEGATIVE
T&S EXPIRATION DATE: NORMAL
WBC NRBC COR # BLD: 9.28 10*3/MM3 (ref 3.4–10.8)

## 2019-10-09 PROCEDURE — 85027 COMPLETE CBC AUTOMATED: CPT | Performed by: ANESTHESIOLOGY

## 2019-10-09 PROCEDURE — 93010 ELECTROCARDIOGRAM REPORT: CPT | Performed by: INTERNAL MEDICINE

## 2019-10-09 PROCEDURE — 93005 ELECTROCARDIOGRAM TRACING: CPT

## 2019-10-09 PROCEDURE — 86901 BLOOD TYPING SEROLOGIC RH(D): CPT | Performed by: ORTHOPAEDIC SURGERY

## 2019-10-09 PROCEDURE — 86850 RBC ANTIBODY SCREEN: CPT | Performed by: ORTHOPAEDIC SURGERY

## 2019-10-09 PROCEDURE — 87641 MR-STAPH DNA AMP PROBE: CPT | Performed by: ORTHOPAEDIC SURGERY

## 2019-10-09 PROCEDURE — 86900 BLOOD TYPING SEROLOGIC ABO: CPT | Performed by: ORTHOPAEDIC SURGERY

## 2019-10-09 PROCEDURE — 36415 COLL VENOUS BLD VENIPUNCTURE: CPT

## 2019-10-09 RX ORDER — METOPROLOL SUCCINATE 50 MG/1
50 TABLET, EXTENDED RELEASE ORAL DAILY
COMMUNITY
End: 2022-09-13

## 2019-10-09 RX ORDER — SODIUM CHLORIDE, SODIUM GLUCONATE, SODIUM ACETATE, POTASSIUM CHLORIDE, AND MAGNESIUM CHLORIDE 526; 502; 368; 37; 30 MG/100ML; MG/100ML; MG/100ML; MG/100ML; MG/100ML
1000 INJECTION, SOLUTION INTRAVENOUS CONTINUOUS
Status: CANCELLED | OUTPATIENT
Start: 2019-10-14

## 2019-10-09 RX ORDER — ONDANSETRON HYDROCHLORIDE 8 MG/1
TABLET, FILM COATED ORAL EVERY 8 HOURS PRN
COMMUNITY
End: 2022-05-12

## 2019-10-09 RX ORDER — DEXTROAMPHETAMINE SULFATE 15 MG/1
15 CAPSULE, EXTENDED RELEASE ORAL DAILY PRN
COMMUNITY
End: 2019-10-09

## 2019-10-09 RX ORDER — DEXTROAMPHETAMINE SULFATE 15 MG/1
15 CAPSULE, EXTENDED RELEASE ORAL 2 TIMES DAILY
COMMUNITY
End: 2021-11-19

## 2019-10-09 NOTE — DISCHARGE INSTRUCTIONS
[facility]  Pre-op Information and Guidelines    You will be called after 2 p.m. the day before your surgery (Friday for Monday surgery) and notified of your time for arrival and approximate surgery time.  If you have not received a call by 4P.M., please contact Same Day Surgery at [phone] of if outside [facility] call [phone].    Please Follow these Important Safety Guidelines:    • The morning of your procedure, take only the medications listed below with   A sip of water:_____________________________________________       __METOPROLOL, VERAPAMIL___________________    • DO NOT eat or drink anything after 12:00 midnight the night before surgery  Specific instructions concerning drinking clear liquids will be discussed during  the pre-surgery instruction call the day before your surgery.    • If you take a blood thinner (ex. Plavix, Coumadin, aspirin), ask your doctor when to stop it before surgery  STOP DATE: _________________    • Only 2 visitors are allowed in patient rooms at a time  Your visitors will be asked to wait in the lobby until the admission process is complete with the exception of a parent with a child and patients in need of special assistance.    • YOU CANNOT DRIVE YOURSELF HOME  You must be accompanied by someone who will be responsible for driving you home after surgery and for your care at home.    • DO NOT chew gum, use breath mints, hard candy, or smoke the day of surgery  • DO NOT drink alcohol for at least 24 hours before your surgery  • DO NOT wear any jewelry and remove all body piercing before coming to the hospital  • DO NOT wear make-up to the hospital  • If you are having surgery on an extremity (arm/leg/foot) remove nail polish/artificial nails on the surgical side  • Clothing, glasses, contacts, dentures, and hairpieces must be removed before surgery  • Bathe the night before or the morning of your surgery and do not use powders/lotions on  skin.

## 2019-10-09 NOTE — PAT
Discussed EKG and B/P with Dr. Perry, no new orders received. Patient denies chest discomfort or soa. Chlorhexidine wash and pre-op instructions given, patient voiced understanding.

## 2019-10-13 ENCOUNTER — ANESTHESIA EVENT (OUTPATIENT)
Dept: PERIOP | Facility: HOSPITAL | Age: 64
End: 2019-10-13

## 2019-10-14 ENCOUNTER — APPOINTMENT (OUTPATIENT)
Dept: GENERAL RADIOLOGY | Facility: HOSPITAL | Age: 64
End: 2019-10-14

## 2019-10-14 ENCOUNTER — HOSPITAL ENCOUNTER (INPATIENT)
Facility: HOSPITAL | Age: 64
LOS: 1 days | Discharge: HOME | End: 2019-10-15
Attending: ORTHOPAEDIC SURGERY | Admitting: ORTHOPAEDIC SURGERY

## 2019-10-14 ENCOUNTER — ANESTHESIA (OUTPATIENT)
Dept: PERIOP | Facility: HOSPITAL | Age: 64
End: 2019-10-14

## 2019-10-14 DIAGNOSIS — I10 ESSENTIAL HYPERTENSION: ICD-10-CM

## 2019-10-14 DIAGNOSIS — Z74.09 IMPAIRED MOBILITY AND ACTIVITIES OF DAILY LIVING: ICD-10-CM

## 2019-10-14 DIAGNOSIS — Z74.09 IMPAIRED PHYSICAL MOBILITY: ICD-10-CM

## 2019-10-14 DIAGNOSIS — M25.551 RIGHT HIP PAIN: ICD-10-CM

## 2019-10-14 DIAGNOSIS — Z78.9 IMPAIRED MOBILITY AND ACTIVITIES OF DAILY LIVING: ICD-10-CM

## 2019-10-14 DIAGNOSIS — M16.11 PRIMARY OSTEOARTHRITIS OF RIGHT HIP: Primary | ICD-10-CM

## 2019-10-14 DIAGNOSIS — Z79.01 LONG TERM (CURRENT) USE OF ANTICOAGULANTS: ICD-10-CM

## 2019-10-14 LAB
ABO GROUP BLD: NORMAL
BLD GP AB SCN SERPL QL: NEGATIVE
HCT VFR BLD AUTO: 38.4 % (ref 37.5–51)
HGB BLD-MCNC: 13.3 G/DL (ref 13–17.7)
Lab: NORMAL
RH BLD: NEGATIVE
T&S EXPIRATION DATE: NORMAL

## 2019-10-14 PROCEDURE — 25010000002 MIDAZOLAM PER 1 MG: Performed by: NURSE ANESTHETIST, CERTIFIED REGISTERED

## 2019-10-14 PROCEDURE — 76000 FLUOROSCOPY <1 HR PHYS/QHP: CPT

## 2019-10-14 PROCEDURE — 88305 TISSUE EXAM BY PATHOLOGIST: CPT | Performed by: ORTHOPAEDIC SURGERY

## 2019-10-14 PROCEDURE — 25010000002 ONDANSETRON PER 1 MG: Performed by: NURSE ANESTHETIST, CERTIFIED REGISTERED

## 2019-10-14 PROCEDURE — 25010000002 HYDROMORPHONE PER 4 MG: Performed by: NURSE ANESTHETIST, CERTIFIED REGISTERED

## 2019-10-14 PROCEDURE — 0SR90JA REPLACEMENT OF RIGHT HIP JOINT WITH SYNTHETIC SUBSTITUTE, UNCEMENTED, OPEN APPROACH: ICD-10-PCS | Performed by: ORTHOPAEDIC SURGERY

## 2019-10-14 PROCEDURE — C1776 JOINT DEVICE (IMPLANTABLE): HCPCS | Performed by: ORTHOPAEDIC SURGERY

## 2019-10-14 PROCEDURE — 88305 TISSUE EXAM BY PATHOLOGIST: CPT | Performed by: PATHOLOGY

## 2019-10-14 PROCEDURE — 85018 HEMOGLOBIN: CPT | Performed by: ORTHOPAEDIC SURGERY

## 2019-10-14 PROCEDURE — 86850 RBC ANTIBODY SCREEN: CPT | Performed by: ANESTHESIOLOGY

## 2019-10-14 PROCEDURE — 27130 TOTAL HIP ARTHROPLASTY: CPT | Performed by: ORTHOPAEDIC SURGERY

## 2019-10-14 PROCEDURE — 25010000002 DEXAMETHASONE PER 1 MG: Performed by: NURSE ANESTHETIST, CERTIFIED REGISTERED

## 2019-10-14 PROCEDURE — 86901 BLOOD TYPING SEROLOGIC RH(D): CPT | Performed by: ANESTHESIOLOGY

## 2019-10-14 PROCEDURE — 88311 DECALCIFY TISSUE: CPT | Performed by: ORTHOPAEDIC SURGERY

## 2019-10-14 PROCEDURE — 97162 PT EVAL MOD COMPLEX 30 MIN: CPT | Performed by: PHYSICAL THERAPIST

## 2019-10-14 PROCEDURE — 97166 OT EVAL MOD COMPLEX 45 MIN: CPT

## 2019-10-14 PROCEDURE — 85014 HEMATOCRIT: CPT | Performed by: ORTHOPAEDIC SURGERY

## 2019-10-14 PROCEDURE — 25010000003 LIDOCAINE 1 % SOLUTION: Performed by: NURSE ANESTHETIST, CERTIFIED REGISTERED

## 2019-10-14 PROCEDURE — 25010000002 PROPOFOL 10 MG/ML EMULSION: Performed by: NURSE ANESTHETIST, CERTIFIED REGISTERED

## 2019-10-14 PROCEDURE — 86900 BLOOD TYPING SEROLOGIC ABO: CPT | Performed by: ANESTHESIOLOGY

## 2019-10-14 PROCEDURE — 88311 DECALCIFY TISSUE: CPT | Performed by: PATHOLOGY

## 2019-10-14 PROCEDURE — 94799 UNLISTED PULMONARY SVC/PX: CPT

## 2019-10-14 DEVICE — PINNACLE CANCELLOUS BONE SCREW 6.5MM X 25MM
Type: IMPLANTABLE DEVICE | Site: ACETABULUM | Status: FUNCTIONAL
Brand: PINNACLE

## 2019-10-14 DEVICE — PINNACLE GRIPTION ACETABULAR SHELL MULTI-HOLE 50MM OD
Type: IMPLANTABLE DEVICE | Site: ACETABULUM | Status: FUNCTIONAL
Brand: PINNACLE GRIPTION

## 2019-10-14 DEVICE — PINNACLE HIP SOLUTIONS ALTRX POLYETHYLENE ACETABULAR LINER NEUTRAL 32MM ID 50MM OD
Type: IMPLANTABLE DEVICE | Site: ACETABULUM | Status: FUNCTIONAL
Brand: PINNACLE ALTRX

## 2019-10-14 DEVICE — BIOLOX DELTA CERAMIC FEMORAL HEAD 32MM DIA +5.0 12/14 TAPER
Type: IMPLANTABLE DEVICE | Site: HIP | Status: FUNCTIONAL
Brand: BIOLOX DELTA

## 2019-10-14 DEVICE — PINNACLE CANCELLOUS BONE SCREW 6.5MM X 30MM
Type: IMPLANTABLE DEVICE | Site: ACETABULUM | Status: FUNCTIONAL
Brand: PINNACLE

## 2019-10-14 DEVICE — CORAIL HIP SYSTEM CEMENTLESS FEMORAL STEM 12/14 AMT 135 DEGREES KA SIZE 11 HA COATED STANDARD COLLAR
Type: IMPLANTABLE DEVICE | Site: HIP | Status: FUNCTIONAL
Brand: CORAIL

## 2019-10-14 DEVICE — TOTL HIP COP DEPUY 9641334: Type: IMPLANTABLE DEVICE | Site: ACETABULUM | Status: FUNCTIONAL

## 2019-10-14 RX ORDER — BUPIVACAINE HYDROCHLORIDE 7.5 MG/ML
INJECTION, SOLUTION EPIDURAL; RETROBULBAR
Status: COMPLETED | OUTPATIENT
Start: 2019-10-14 | End: 2019-10-14

## 2019-10-14 RX ORDER — ACETAMINOPHEN 500 MG
1000 TABLET ORAL ONCE
Status: COMPLETED | OUTPATIENT
Start: 2019-10-14 | End: 2019-10-14

## 2019-10-14 RX ORDER — CLINDAMYCIN PHOSPHATE 600 MG/50ML
600 INJECTION INTRAVENOUS EVERY 8 HOURS
Status: COMPLETED | OUTPATIENT
Start: 2019-10-14 | End: 2019-10-14

## 2019-10-14 RX ORDER — LIDOCAINE HYDROCHLORIDE 10 MG/ML
INJECTION, SOLUTION INFILTRATION; PERINEURAL AS NEEDED
Status: DISCONTINUED | OUTPATIENT
Start: 2019-10-14 | End: 2019-10-14 | Stop reason: SURG

## 2019-10-14 RX ORDER — METOPROLOL SUCCINATE 50 MG/1
50 TABLET, EXTENDED RELEASE ORAL DAILY
Status: DISCONTINUED | OUTPATIENT
Start: 2019-10-14 | End: 2019-10-15 | Stop reason: HOSPADM

## 2019-10-14 RX ORDER — ONDANSETRON 4 MG/1
4 TABLET, FILM COATED ORAL EVERY 6 HOURS PRN
Status: DISCONTINUED | OUTPATIENT
Start: 2019-10-14 | End: 2019-10-15 | Stop reason: HOSPADM

## 2019-10-14 RX ORDER — DEXAMETHASONE SODIUM PHOSPHATE 4 MG/ML
INJECTION, SOLUTION INTRA-ARTICULAR; INTRALESIONAL; INTRAMUSCULAR; INTRAVENOUS; SOFT TISSUE AS NEEDED
Status: DISCONTINUED | OUTPATIENT
Start: 2019-10-14 | End: 2019-10-14 | Stop reason: SURG

## 2019-10-14 RX ORDER — WARFARIN SODIUM 5 MG/1
5 TABLET ORAL
Status: DISCONTINUED | OUTPATIENT
Start: 2019-10-14 | End: 2019-10-15 | Stop reason: HOSPADM

## 2019-10-14 RX ORDER — MIDAZOLAM HYDROCHLORIDE 1 MG/ML
INJECTION INTRAMUSCULAR; INTRAVENOUS AS NEEDED
Status: DISCONTINUED | OUTPATIENT
Start: 2019-10-14 | End: 2019-10-14 | Stop reason: SURG

## 2019-10-14 RX ORDER — KETAMINE HYDROCHLORIDE 100 MG/ML
INJECTION INTRAMUSCULAR; INTRAVENOUS AS NEEDED
Status: DISCONTINUED | OUTPATIENT
Start: 2019-10-14 | End: 2019-10-14 | Stop reason: SURG

## 2019-10-14 RX ORDER — SODIUM CHLORIDE 0.9 % (FLUSH) 0.9 %
3 SYRINGE (ML) INJECTION EVERY 12 HOURS SCHEDULED
Status: DISCONTINUED | OUTPATIENT
Start: 2019-10-14 | End: 2019-10-15 | Stop reason: HOSPADM

## 2019-10-14 RX ORDER — SODIUM CHLORIDE, SODIUM GLUCONATE, SODIUM ACETATE, POTASSIUM CHLORIDE, AND MAGNESIUM CHLORIDE 526; 502; 368; 37; 30 MG/100ML; MG/100ML; MG/100ML; MG/100ML; MG/100ML
1000 INJECTION, SOLUTION INTRAVENOUS CONTINUOUS
Status: DISCONTINUED | OUTPATIENT
Start: 2019-10-14 | End: 2019-10-15 | Stop reason: HOSPADM

## 2019-10-14 RX ORDER — MELOXICAM 15 MG/1
15 TABLET ORAL ONCE
Status: COMPLETED | OUTPATIENT
Start: 2019-10-14 | End: 2019-10-14

## 2019-10-14 RX ORDER — 0.9 % SODIUM CHLORIDE 0.9 %
VIAL (ML) INJECTION AS NEEDED
Status: DISCONTINUED | OUTPATIENT
Start: 2019-10-14 | End: 2019-10-14 | Stop reason: HOSPADM

## 2019-10-14 RX ORDER — ONDANSETRON 2 MG/ML
4 INJECTION INTRAMUSCULAR; INTRAVENOUS ONCE AS NEEDED
Status: DISCONTINUED | OUTPATIENT
Start: 2019-10-14 | End: 2019-10-14 | Stop reason: HOSPADM

## 2019-10-14 RX ORDER — FERROUS SULFATE TAB EC 324 MG (65 MG FE EQUIVALENT) 324 (65 FE) MG
324 TABLET DELAYED RESPONSE ORAL
Status: DISCONTINUED | OUTPATIENT
Start: 2019-10-14 | End: 2019-10-15 | Stop reason: HOSPADM

## 2019-10-14 RX ORDER — ONDANSETRON 2 MG/ML
4 INJECTION INTRAMUSCULAR; INTRAVENOUS EVERY 6 HOURS PRN
Status: DISCONTINUED | OUTPATIENT
Start: 2019-10-14 | End: 2019-10-15 | Stop reason: HOSPADM

## 2019-10-14 RX ORDER — NALOXONE HCL 0.4 MG/ML
0.1 VIAL (ML) INJECTION
Status: DISCONTINUED | OUTPATIENT
Start: 2019-10-14 | End: 2019-10-15 | Stop reason: HOSPADM

## 2019-10-14 RX ORDER — SODIUM CHLORIDE 0.9 % (FLUSH) 0.9 %
3-10 SYRINGE (ML) INJECTION AS NEEDED
Status: DISCONTINUED | OUTPATIENT
Start: 2019-10-14 | End: 2019-10-15 | Stop reason: HOSPADM

## 2019-10-14 RX ORDER — FAMOTIDINE 40 MG/1
40 TABLET, FILM COATED ORAL DAILY
Status: DISCONTINUED | OUTPATIENT
Start: 2019-10-14 | End: 2019-10-15 | Stop reason: HOSPADM

## 2019-10-14 RX ORDER — SODIUM CHLORIDE 9 MG/ML
100 INJECTION, SOLUTION INTRAVENOUS CONTINUOUS
Status: DISPENSED | OUTPATIENT
Start: 2019-10-14 | End: 2019-10-15

## 2019-10-14 RX ORDER — LATANOPROST 50 UG/ML
1 SOLUTION/ DROPS OPHTHALMIC NIGHTLY
Status: DISCONTINUED | OUTPATIENT
Start: 2019-10-14 | End: 2019-10-15 | Stop reason: HOSPADM

## 2019-10-14 RX ORDER — SENNA AND DOCUSATE SODIUM 50; 8.6 MG/1; MG/1
2 TABLET, FILM COATED ORAL 2 TIMES DAILY
Status: DISCONTINUED | OUTPATIENT
Start: 2019-10-14 | End: 2019-10-15 | Stop reason: HOSPADM

## 2019-10-14 RX ORDER — OXYCODONE HYDROCHLORIDE 5 MG/1
5 TABLET ORAL EVERY 4 HOURS PRN
Status: DISCONTINUED | OUTPATIENT
Start: 2019-10-14 | End: 2019-10-15 | Stop reason: HOSPADM

## 2019-10-14 RX ORDER — OXYCODONE HCL 10 MG/1
10 TABLET, FILM COATED, EXTENDED RELEASE ORAL ONCE
Status: COMPLETED | OUTPATIENT
Start: 2019-10-14 | End: 2019-10-14

## 2019-10-14 RX ORDER — BUPIVACAINE HCL/0.9 % NACL/PF 0.1 %
2 PLASTIC BAG, INJECTION (ML) EPIDURAL ONCE
Status: COMPLETED | OUTPATIENT
Start: 2019-10-14 | End: 2019-10-14

## 2019-10-14 RX ORDER — BACITRACIN 50000 [IU]/1
INJECTION, POWDER, FOR SOLUTION INTRAMUSCULAR AS NEEDED
Status: DISCONTINUED | OUTPATIENT
Start: 2019-10-14 | End: 2019-10-14 | Stop reason: HOSPADM

## 2019-10-14 RX ORDER — ACETAMINOPHEN 500 MG
1000 TABLET ORAL 4 TIMES DAILY
Status: DISCONTINUED | OUTPATIENT
Start: 2019-10-14 | End: 2019-10-15 | Stop reason: HOSPADM

## 2019-10-14 RX ORDER — HYDROMORPHONE HCL 110MG/55ML
PATIENT CONTROLLED ANALGESIA SYRINGE INTRAVENOUS AS NEEDED
Status: DISCONTINUED | OUTPATIENT
Start: 2019-10-14 | End: 2019-10-14 | Stop reason: SURG

## 2019-10-14 RX ORDER — ONDANSETRON 2 MG/ML
INJECTION INTRAMUSCULAR; INTRAVENOUS AS NEEDED
Status: DISCONTINUED | OUTPATIENT
Start: 2019-10-14 | End: 2019-10-14 | Stop reason: SURG

## 2019-10-14 RX ADMIN — LATANOPROST 1 DROP: 50 SOLUTION OPHTHALMIC at 20:12

## 2019-10-14 RX ADMIN — MIDAZOLAM HYDROCHLORIDE 1 MG: 2 INJECTION, SOLUTION INTRAMUSCULAR; INTRAVENOUS at 07:50

## 2019-10-14 RX ADMIN — SODIUM CHLORIDE 100 ML/HR: 9 INJECTION, SOLUTION INTRAVENOUS at 13:10

## 2019-10-14 RX ADMIN — ACETAMINOPHEN 1000 MG: 500 TABLET ORAL at 06:20

## 2019-10-14 RX ADMIN — DEXAMETHASONE SODIUM PHOSPHATE 4 MG: 4 INJECTION, SOLUTION INTRAMUSCULAR; INTRAVENOUS at 08:45

## 2019-10-14 RX ADMIN — FAMOTIDINE 40 MG: 40 TABLET ORAL at 13:17

## 2019-10-14 RX ADMIN — TRANEXAMIC ACID 1000 MG: 100 INJECTION, SOLUTION INTRAVENOUS at 06:20

## 2019-10-14 RX ADMIN — LIDOCAINE HYDROCHLORIDE 3 ML: 10 INJECTION, SOLUTION INFILTRATION; PERINEURAL at 07:14

## 2019-10-14 RX ADMIN — ACETAMINOPHEN 1000 MG: 500 TABLET ORAL at 17:16

## 2019-10-14 RX ADMIN — CLINDAMYCIN IN 5 PERCENT DEXTROSE 600 MG: 12 INJECTION, SOLUTION INTRAVENOUS at 20:13

## 2019-10-14 RX ADMIN — MELOXICAM 15 MG: 15 TABLET ORAL at 06:20

## 2019-10-14 RX ADMIN — OXYCODONE HYDROCHLORIDE 5 MG: 5 TABLET ORAL at 14:43

## 2019-10-14 RX ADMIN — MIDAZOLAM HYDROCHLORIDE 2 MG: 2 INJECTION, SOLUTION INTRAMUSCULAR; INTRAVENOUS at 07:07

## 2019-10-14 RX ADMIN — OXYCODONE HYDROCHLORIDE 5 MG: 5 TABLET ORAL at 20:11

## 2019-10-14 RX ADMIN — SODIUM CHLORIDE, SODIUM GLUCONATE, SODIUM ACETATE, POTASSIUM CHLORIDE, AND MAGNESIUM CHLORIDE 1000 ML: 526; 502; 368; 37; 30 INJECTION, SOLUTION INTRAVENOUS at 06:20

## 2019-10-14 RX ADMIN — TRANEXAMIC ACID 1000 MG: 100 INJECTION, SOLUTION INTRAVENOUS at 09:12

## 2019-10-14 RX ADMIN — HYDROMORPHONE HYDROCHLORIDE 0.5 MG: 2 INJECTION INTRAMUSCULAR; INTRAVENOUS; SUBCUTANEOUS at 09:29

## 2019-10-14 RX ADMIN — ACETAMINOPHEN 1000 MG: 500 TABLET ORAL at 13:17

## 2019-10-14 RX ADMIN — ONDANSETRON 4 MG: 2 INJECTION INTRAMUSCULAR; INTRAVENOUS at 08:45

## 2019-10-14 RX ADMIN — OXYCODONE HYDROCHLORIDE 10 MG: 10 TABLET, FILM COATED, EXTENDED RELEASE ORAL at 06:20

## 2019-10-14 RX ADMIN — HYDROMORPHONE HYDROCHLORIDE 0.5 MG: 2 INJECTION INTRAMUSCULAR; INTRAVENOUS; SUBCUTANEOUS at 09:35

## 2019-10-14 RX ADMIN — METOPROLOL TARTRATE 25 MG: 25 TABLET ORAL at 07:01

## 2019-10-14 RX ADMIN — ACETAMINOPHEN 1000 MG: 500 TABLET ORAL at 20:11

## 2019-10-14 RX ADMIN — SENNOSIDES AND DOCUSATE SODIUM 2 TABLET: 8.6; 5 TABLET ORAL at 13:14

## 2019-10-14 RX ADMIN — Medication 2 G: at 07:25

## 2019-10-14 RX ADMIN — PROPOFOL 50 MCG/KG/MIN: 10 INJECTION, EMULSION INTRAVENOUS at 07:25

## 2019-10-14 RX ADMIN — SENNOSIDES AND DOCUSATE SODIUM 2 TABLET: 8.6; 5 TABLET ORAL at 20:11

## 2019-10-14 RX ADMIN — FERROUS SULFATE TAB EC 324 MG (65 MG FE EQUIVALENT) 324 MG: 324 (65 FE) TABLET DELAYED RESPONSE at 13:17

## 2019-10-14 RX ADMIN — CLINDAMYCIN IN 5 PERCENT DEXTROSE 600 MG: 12 INJECTION, SOLUTION INTRAVENOUS at 13:10

## 2019-10-14 RX ADMIN — WARFARIN SODIUM 5 MG: 5 TABLET ORAL at 17:18

## 2019-10-14 RX ADMIN — MIDAZOLAM HYDROCHLORIDE 1 MG: 2 INJECTION, SOLUTION INTRAMUSCULAR; INTRAVENOUS at 07:39

## 2019-10-14 RX ADMIN — KETAMINE HYDROCHLORIDE 20 MG: 100 INJECTION INTRAMUSCULAR; INTRAVENOUS at 09:02

## 2019-10-14 RX ADMIN — SODIUM CHLORIDE, SODIUM GLUCONATE, SODIUM ACETATE, POTASSIUM CHLORIDE, AND MAGNESIUM CHLORIDE: 526; 502; 368; 37; 30 INJECTION, SOLUTION INTRAVENOUS at 08:46

## 2019-10-14 RX ADMIN — BUPIVACAINE HYDROCHLORIDE 2 ML: 7.5 INJECTION, SOLUTION EPIDURAL; RETROBULBAR at 07:19

## 2019-10-14 NOTE — ANESTHESIA PROCEDURE NOTES
Spinal Block    Pre-sedation assessment completed: 10/14/2019 7:07 AM    Patient reassessed immediately prior to procedure    Patient location during procedure: OR  Start Time: 10/14/2019 7:14 AM  Stop Time: 10/14/2019 7:19 AM  Indication:at surgeon's request and procedure for pain  Performed By  CRNA: Wesley Johnson CRNA  Preanesthetic Checklist  Completed: patient identified, site marked, surgical consent, pre-op evaluation, timeout performed, IV checked, risks and benefits discussed and monitors and equipment checked  Spinal Block Prep:  Patient Position:sitting  Sterile Tech:cap, gloves, sterile barriers and mask  Prep:Chloraprep  Patient Monitoring:EKG, continuous pulse oximetry and blood pressure monitoring  Spinal Block Procedure  Approach:midline  Guidance:landmark technique and palpation technique  Location:L4-L5  Needle Type:Pencan  Needle Gauge:24 G  Placement of Spinal needle event:cerebrospinal fluid aspirated  Paresthesia: no  Fluid Appearance:clear  Medications: bupivacaine PF (MARCAINE) 0.75 % injection, 2 mL  Med Administered at 10/14/2019 7:19 AM   Post Assessment  Patient Tolerance:patient tolerated the procedure well with no apparent complications  Complications no

## 2019-10-14 NOTE — INTERVAL H&P NOTE
H&P reviewed. The patient was examined and there are no changes to the H&P.     The patient voiced understanding of the risks, benefits, and alternative forms of treatment that were discussed and the patient consents to proceed with surgery.  All risks, benefits and alternatives were discussed.  Risks including but not exclusive to anesthetic complications, including death, MI, CVA, infection, bleeding DVT, fracture, residual pain and need for future surgery.    This discussion was held with the patient by Nathaniel Kate MD and all questions were answered.    Plan right BOUCHRA.    10/14/19 at 6:26 AM by Nathaniel Kate MD

## 2019-10-14 NOTE — PROGRESS NOTES
"Anticoagulation by Pharmacy - Warfarin    Sam Infante is a 64 y.o.male being initiated on warfarin for VTE prophylaxis  Home regimen: New start  INR Goal: 1.7 - 2.5  Last INR: No results found for: INR    Objective:  [Ht: 167.6 cm (66\"); Wt: 86 kg (189 lb 9.5 oz)]  No results found for: INR, PROTIME  Lab Results   Component Value Date    HGB 13.3 10/14/2019    HGB 16.5 10/09/2019    HCT 38.4 10/14/2019    HCT 47.1 10/09/2019     10/09/2019           Assessment  Interacting medications: No significant interactions noted  INR is unknown at this time - INR to be collected tomorrow    Plan:  1.  Give warfarin 5 mg tablet PO @ 1800 tonight  2.  Draw a PT/INR in AM  3.  Pharmacy will continue to follow    Aron Hodges RPH  10/14/19 1:54 PM     "

## 2019-10-14 NOTE — PLAN OF CARE
Problem: Fall Risk (Adult)  Goal: Identify Related Risk Factors and Signs and Symptoms  Outcome: Ongoing (interventions implemented as appropriate)   10/14/19 1456   Fall Risk (Adult)   Related Risk Factors (Fall Risk) age-related changes;gait/mobility problems     Goal: Absence of Fall  Outcome: Ongoing (interventions implemented as appropriate)   10/14/19 1456   Fall Risk (Adult)   Absence of Fall making progress toward outcome

## 2019-10-14 NOTE — ANESTHESIA PREPROCEDURE EVALUATION
Anesthesia Evaluation     Patient summary reviewed   NPO Solid Status: > 8 hours  NPO Liquid Status: > 8 hours           Airway   Mallampati: II  TM distance: >3 FB  Neck ROM: full  No difficulty expected  Dental    (+) poor dentition    Pulmonary     breath sounds clear to auscultation  (+) a smoker Former, sleep apnea,   Cardiovascular - normal exam  Exercise tolerance: good (4-7 METS)    NYHA Classification: II  ECG reviewed    (+) hypertension, hyperlipidemia,       Neuro/Psych  (+) weakness, numbness, psychiatric history Anxiety,     GI/Hepatic/Renal/Endo    (+)  GERD,  renal disease,     Musculoskeletal     (+) chronic pain, gait problem,   Abdominal    Substance History      OB/GYN          Other   (+) arthritis                     Anesthesia Plan    ASA 3     spinal and MAC   (Patient has chronic htn, treated with metoprolol this am here is sds; patient is nervous also.  I reassured him about the spinal and potential for a GA.)  intravenous induction   Anesthetic plan, all risks, benefits, and alternatives have been provided, discussed and informed consent has been obtained with: patient.

## 2019-10-14 NOTE — PLAN OF CARE
Problem: Patient Care Overview  Goal: Plan of Care Review  Outcome: Ongoing (interventions implemented as appropriate)   10/14/19 1407   Coping/Psychosocial   Plan of Care Reviewed With patient   OTHER   Outcome Summary PT eval completed as co eval with OT, was able to come to sit EOB with HOB up with only supervision, able to come to stand with rolling walker and CGA of fone, able to ambulate 188 feet rolling walker and CGA of fone, able to go from stand to sit to supine with CGA of one to get RLE upon to bed, could benefit from skilled PT to regain and return to hihgest level of function in bed mobility, transfers and gait, could benefit from outpatiient PT once d/c home     Goal: Discharge Needs Assessment  Outcome: Ongoing (interventions implemented as appropriate)   10/14/19 1407   Discharge Needs Assessment   Concerns to be Addressed no discharge needs identified   Patient/Family Anticipates Transition to home with family   Patient/Family Anticipated Services at Transition rehabilitation services   Transportation Anticipated family or friend will provide   Anticipated Changes Related to Illness none   Equipment Needed After Discharge commode   Outpatient/Agency/Support Group Needs outpatient therapy   Current Discharge Risk physical impairment   Disability   Equipment Currently Used at Home walker, rolling

## 2019-10-14 NOTE — THERAPY EVALUATION
Acute Care - Physical Therapy Initial Evaluation  [facility]     Patient Name: Sam Infante  : 1955  MRN: [id number]  Today's Date: 10/14/2019   Onset of Illness/Injury or Date of Surgery: 10/14/19  Date of Referral to PT: 10/14/19  Referring Physician: Dr. Kate      Admit Date: 10/14/2019    Visit Dx:     ICD-10-CM ICD-9-CM   1. Right hip pain M25.551 719.45   2. Primary osteoarthritis of right hip M16.11 715.15   3. Essential hypertension I10 401.9   4. Impaired physical mobility Z74.09 781.99     Patient Active Problem List   Diagnosis   • Borderline glaucoma, open angle with borderline findings   • Borderline glaucoma   • Left hand pain   • Bilateral carpal tunnel syndrome   • Right hip pain   • Primary osteoarthritis of right hip   • Essential hypertension     Past Medical History:   Diagnosis Date   • Arthritis    • Elevated cholesterol    • GERD (gastroesophageal reflux disease)     OCCASIONAL   • High blood pressure    • Kidney stone    • Sleep apnea     using c-pap   • Vitreous floater 2013     Past Surgical History:   Procedure Laterality Date   • COLONOSCOPY     • RHINOPLASTY     • SEPTOPLASTY  2013    Nasal surgery procedure (Nasal septoplasty.)        PT ASSESSMENT (last 12 hours)      Physical Therapy Evaluation     Row Name 10/14/19 1407          PT Evaluation Time/Intention    Subjective Information  complains of;pain  -CB     Document Type  evaluation  -CB     Mode of Treatment  co-treatment;physical therapy;occupational therapy  -CB     Patient Effort  good  -CB     Row Name 10/14/19 1401          General Information    Patient Profile Reviewed?  yes  -CB     Onset of Illness/Injury or Date of Surgery  10/14/19  -CB     Referring Physician  Dr. 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  -CB     Equipment Issued to Patient  gait belt  -CB     Risks Reviewed  patient:;increased discomfort;dizziness;change in vital signs  -CB     Benefits Reviewed  patient:;improve function;increase independence;decrease pain  -CB     Row Name 10/14/19 1407          Relationship/Environment    Primary Source of Support/Comfort  spouse  -CB     Lives With  spouse  -CB     Row Name 10/14/19 1407          Resource/Environmental Concerns    Current Living Arrangements  home/apartment/condo  -CB     Resource/Environmental Concerns  home accessibility has walk out basement- will stay there until able to do flig  -CB     Home Accessibility Concerns  stairs to enter home  -CB     Row Name 10/14/19 1407          Living Environment    Living Arrangements  house  -CB     Home Accessibility  stairs to enter home has walk out basement and will stay there until able to do f  -CB     Row Name 10/14/19 1407          Home Main Entrance    Stairs Comment, Main Entrance  going to drive around to walk out basement and stay there until able to do flight of steps  -CB     Row Name 10/14/19 1407          Cognitive Assessment/Interventions    Additional Documentation  Cognitive Assessment/Intervention (Group)  -CB     Row Name 10/14/19 1407          Cognitive Assessment/Intervention- PT/OT    Affect/Mental Status (Cognitive)  WFL  -CB     Orientation Status (Cognition)  oriented x 4  -CB     Follows Commands (Cognition)  WFL  -CB     Cognitive Function (Cognitive)  WFL  -CB     Personal Safety Interventions  fall prevention program maintained;gait belt;nonskid shoes/slippers when out of bed  -CB     Row Name 10/14/19 1407          Safety  Issues, Functional Mobility    Impairments Affecting Function (Mobility)  pain;strength  -CB     Row Name 10/14/19 1407          Bed Mobility Assessment/Treatment    Bed Mobility Assessment/Treatment  supine-sit;sit-supine  -CB     Supine-Sit [place] (Bed Mobility)  supervision  -CB     Sit-Supine [place] (Bed Mobility)  contact guard  -CB     Bed Mobility, Safety Issues  decreased use of legs for bridging/pushing  -CB     Assistive Device (Bed Mobility)  bed rails;head of bed elevated  -CB     Row Name 10/14/19 1407          Transfer Assessment/Treatment    Transfer Assessment/Treatment  sit-stand transfer;stand-sit transfer;toilet transfer  -CB     Sit-Stand [place] (Transfers)  contact guard  -CB     Stand-Sit [place] (Transfers)  contact guard  -CB     [place] Level (Toilet Transfer)  contact guard;stand by assist;verbal cues  -CB     Assistive Device (Toilet Transfer)  walker, front-wheeled  -CB     Row Name 10/14/19 1407          Sit-Stand Transfer    Assistive Device (Sit-Stand Transfers)  walker, front-wheeled  -CB     Row Name 10/14/19 1407          Stand-Sit Transfer    Assistive Device (Stand-Sit Transfers)  walker, front-wheeled  -CB     Row Name 10/14/19 1407          Toilet Transfer    Type (Toilet Transfer)  stand-sit;sit-stand  -CB     Row Name 10/14/19 1407          Gait/Stairs Assessment/Training    Gait/Stairs Assessment/Training  gait/ambulation independence;gait/ambulation assistive device;distance ambulated  -CB     [place] Level (Gait)  contact guard  -CB     Assistive Device (Gait)  walker, front-wheeled  -CB     Distance in Feet (Gait)  188 feet  -CB     Row Name 10/14/19 1407          General ROM    GENERAL ROM COMMENTS  AROM WFL LLE, AAROM WFL RLE  -CB     Row Name 10/14/19 1407          MMT (Manual Muscle Testing)    General MMT Comments  grossly RLE 3-/5 hip, 3/5 knee 4/5 ankle, LLE 4/5  -CB     Row Name 10/14/19 1407          Sensory Assessment/Intervention     Sensory General Assessment  no sensation deficits identified  -CB     Row Name 10/14/19 1407          Hearing Assessment    Hearing Status  -- deaf in R ear  -CB     Row Name 10/14/19 1407          Vision Assessment/Intervention    Visual Impairment/Limitations  corrective lenses full time  -CB     Row Name 10/14/19 1407          Pain Assessment    Additional Documentation  Pain Scale: Numbers Pre/Post-Treatment (Group)  -CB     Row Name 10/14/19 1407          Pain Scale: Numbers Pre/Post-Treatment    Pain Scale: Numbers, Pretreatment  7/10  -CB     Pain Scale: Numbers, Post-Treatment  7/10  -CB     Pain Location - Side  Right  -CB     Pain Location  hip  -CB     Pain Intervention(s)  Medication (See MAR);Ambulation/increased activity  -CB     Row Name             Wound 10/14/19 0837 Right anterior hip Incision    Wound - Properties Group Date first assessed: 10/14/19  -TB Time first assessed: 0837  -TB Present on Hospital Admission: N  -TB Side: Right  -TB Orientation: anterior  -TB Location: hip  -TB Primary Wound Type: Incision  -TB    Row Name 10/14/19 1407          Coping    Observed Emotional State  calm;cooperative  -CB     Row Name 10/14/19 1407          Plan of Care Review    Plan of Care Reviewed With  patient  -CB     Row Name 10/14/19 1407          Physical Therapy Clinical Impression    Date of Referral to PT  10/14/19  -CB     PT Diagnosis (PT Clinical Impression)  impaired physical mobility  -CB     Criteria for Skilled Interventions Met (PT Clinical Impression)  treatment indicated;yes  -CB     Pathology/Pathophysiology Noted (Describe Specifically for Each System)  musculoskeletal  -CB     Impairments Found (describe specific impairments)  aerobic capacity/endurance;gait, locomotion, and balance;muscle performance  -CB     Rehab Potential (PT Clinical Summary)  good, to achieve stated therapy goals  -CB     Predicted Duration of Therapy (PT)  until goals met or discharged  -CB     Care Plan  Review (PT)  care plan/treatment goals reviewed  -CB     Referral Needed to Another Service (PT)  -- out patient PT  -CB     Row Name 10/14/19 140          Vital Signs    Pre Systolic BP Rehab  172  -CB     Pre Treatment Diastolic BP  91  -CB     Post Systolic BP Rehab  176  -CB     Post Treatment Diastolic BP  94  -CB     Pretreatment Heart Rate (beats/min)  88  -CB     Posttreatment Heart Rate (beats/min)  84  -CB     Pre SpO2 (%)  98  -CB     O2 Delivery Pre Treatment  room air  -CB     Post SpO2 (%)  97  -CB     O2 Delivery Post Treatment  room air  -CB     Pre Patient Position  Supine  -CB     Intra Patient Position  Standing  -CB     Post Patient Position  Supine  -CB     Row Name 10/14/19 1402          Physical Therapy Goals    Bed Mobility Goal Selection (PT)  bed mobility, PT goal 1  -CB     Transfer Goal Selection (PT)  transfer, PT goal 1  -CB     Gait Training Goal Selection (PT)  gait training, PT goal 1  -CB     Stairs Goal Selection (PT)  stairs, PT goal 1;stairs, PT goal 2  -CB     Additional Documentation  Stairs Goal Selection (PT) (Row)  -CB     Row Name 10/14/19 9316          Bed Mobility Goal 1 (PT)    Activity/Assistive Device (Bed Mobility Goal 1, PT)  sit to supine;supine to sit HOB down and no rails  -CB     [place] Level/Cues Needed (Bed Mobility Goal 1, PT)  independent  -CB     Time Frame (Bed Mobility Goal 1, PT)  3 days  -CB     Progress/Outcomes (Bed Mobility Goal 1, PT)  goal not met;goal ongoing  -CB     Row Name 10/14/19 3337          Transfer Goal 1 (PT)    Activity/Assistive Device (Transfer Goal 1, PT)  sit-to-stand/stand-to-sit;bed-to-chair/chair-to-bed;walker, rolling  -CB     [place] Level/Cues Needed (Transfer Goal 1, PT)  supervision required  -CB     Time Frame (Transfer Goal 1, PT)  2 days  -CB     Progress/Outcome (Transfer Goal 1, PT)  goal not met;goal ongoing  -CB     Row Name 10/14/19 5226          Gait Training Goal 1 (PT)    Activity/Assistive Device  (Gait Training Goal 1, PT)  gait (walking locomotion);assistive device use;decrease fall risk;increase endurance/gait distance;turning, left;turning, right;walker, rolling  -CB     [place] Level (Gait Training Goal 1, PT)  supervision required;standby assist  -CB     Distance (Gait Goal 1, PT)  300 feet  -CB     Time Frame (Gait Training Goal 1, PT)  2 - 3 days  -CB     Progress/Outcome (Gait Training Goal 1, PT)  goal not met;goal ongoing  -CB     Row Name 10/14/19 1407          Stairs Goal 1 (PT)    Activity/Assistive Device (Stairs Goal 1, PT)  ascending stairs;descending stairs  -CB     [place] Level/Cues Needed (Stairs Goal 1, PT)  contact guard assist  -CB     Number of Stairs (Stairs Goal 1, PT)  1  -CB     Time Frame (Stairs Goal 1, PT)  1 day  -CB     Progress/Outcome (Stairs Goal 1, PT)  goal not met;goal ongoing  -CB     Row Name 10/14/19 1407          Stairs Goal 2 (PT)    Activity/Assistive Device (Stairs Goal 2, PT)  ascending stairs;descending stairs;using handrail, right  -CB     [place] Level/Cues Needed (Stairs Goal 2, PT)  contact guard assist  -CB     Number of Stairs (Stairs Goal 2, PT)  12  -CB     Time Frame (Stairs Goal 2, PT)  4 days  -CB     Progress/Outcome (Stairs Goal 2, PT)  goal not met;goal ongoing  -CB     Row Name 10/14/19 1407          Positioning and Restraints    Pre-Treatment Position  in bed  -CB     Post Treatment Position  bed  -CB       User Key  (r) = Recorded By, (t) = Taken By, (c) = Cosigned By    Initials Name Provider Type    Elena Elkins, PT Physical Therapist    TB Regan Woodall, RN Registered Nurse        Physical Therapy Education     Title: PT OT SLP Therapies (In Progress)     Topic: Physical Therapy (In Progress)     Point: Mobility training (In Progress)     Learning Progress Summary           Patient Acceptance, E,D, NR by TOY at 10/14/2019  3:18 PM    Comment:  educated on FWB RLE- no precautions, answered all questions about driving                    Point: Precautions (In Progress)     Learning Progress Summary           Patient Acceptance, E,D, NR by CB at 10/14/2019  3:18 PM    Comment:  educated on FWB RLE- no precautions, answered all questions about driving                               User Key     Initials Effective Dates Name Provider Type Discipline     07/24/19 -  Elena Dominguez, PT Physical Therapist PT              PT Recommendation and Plan  Anticipated Discharge Disposition (PT): home with OP services  Planned Therapy Interventions (PT Eval): bed mobility training, gait training, home exercise program, patient/family education, stair training, strengthening, transfer training  Therapy Frequency (PT Clinical Impression): (6x week)  Outcome Summary/Treatment Plan (PT)  Anticipated Equipment Needs at Discharge (PT): bedside commode  Anticipated Discharge Disposition (PT): home with OP services  Referral Needed to Another Service (PT): (out patient PT)  Plan of Care Reviewed With: patient  Outcome Summary: PT eval completed as co eval with OT, was able to come to sit EOB with HOB up with only supervision, able to come to stand with rolling walker and CGA of fone, able to ambulate 188 feet rolling walker and CGA of fone, able to go from stand to sit to supine with CGA of one to get RLE upon to bed, could benefit from skilled PT to regain and return to hihgest level of function in bed mobility, transfers and gait, could benefit from outpatiient PT once d/c home  Outcome Measures     Row Name 10/14/19 9006             How much help from another person do you currently need...    Turning from your back to your side while in flat bed without using bedrails?  3  -CB      Moving from lying on back to sitting on the side of a flat bed without bedrails?  3  -CB      Moving to and from a bed to a chair (including a wheelchair)?  3  -CB      Standing up from a chair using your arms (e.g., wheelchair, bedside chair)?  3  -CB      Climbing 3-5  steps with a railing?  3  -CB      To walk in hospital room?  3  -CB      AM-PAC 6 Clicks Score (PT)  18  -CB         Functional Assessment    Outcome Measure Options  AM-PAC 6 Clicks Basic Mobility (PT)  -CB        User Key  (r) = Recorded By, (t) = Taken By, (c) = Cosigned By    Initials Name Provider Type    Elena Elkins, PT Physical Therapist         Time Calculation:   PT Charges     Row Name 10/14/19 1525             Time Calculation    Start Time  1407  -CB      Stop Time  1440  -CB      Time Calculation (min)  33 min  -CB      PT Received On  10/14/19  -CB      PT Goal Re-Cert Due Date  10/27/19  -CB        User Key  (r) = Recorded By, (t) = Taken By, (c) = Cosigned By    Initials Name Provider Type    Elena Elkins, PT Physical Therapist        Therapy Charges for Today     Code Description Service Date Service Provider Modifiers Qty    34277928941 HC PT EVAL MOD COMPLEXITY 2 10/14/2019 Elena Dominguez, PT GP 1          PT G-Codes  Outcome Measure Options: AM-PAC 6 Clicks Basic Mobility (PT)  AM-PAC 6 Clicks Score (PT): 18      Elena Dominguez, ELEUTERIO  10/14/2019

## 2019-10-14 NOTE — PLAN OF CARE
Problem: Patient Care Overview  Goal: Plan of Care Review  Outcome: Ongoing (interventions implemented as appropriate)   10/14/19 2524   Coping/Psychosocial   Plan of Care Reviewed With patient   OTHER   Outcome Summary OT eval on this date as co-eval with PT. Bed mob with supervision to CGA. Pt required CGA for sit to stand, 88' ambulation and toilet transfers. Supervision to CGA required to don/doff L sock. Pt limited by decreased ROM, strength, endurance and balance. He could benefit from OT services to regain lost function for ADLs and functional mobililty. Rec. outpatient PT when D/C from hospital.

## 2019-10-14 NOTE — THERAPY EVALUATION
Acute Care - Occupational Therapy Initial Evaluation  [facility]     Patient Name: Sam Infante  : 1955  MRN: [id number]  Today's Date: 10/14/2019  Onset of Illness/Injury or Date of Surgery: 10/14/19  Date of Referral to OT: 10/14/19  Referring Physician: Dr. Kate    Admit Date: 10/14/2019       ICD-10-CM ICD-9-CM   1. Right hip pain M25.551 719.45   2. Primary osteoarthritis of right hip M16.11 715.15   3. Essential hypertension I10 401.9   4. Impaired physical mobility Z74.09 781.99   5. Impaired mobility and activities of daily living Z74.09 799.89     Patient Active Problem List   Diagnosis   • Borderline glaucoma, open angle with borderline findings   • Borderline glaucoma   • Left hand pain   • Bilateral carpal tunnel syndrome   • Right hip pain   • Primary osteoarthritis of right hip   • Essential hypertension     Past Medical History:   Diagnosis Date   • Arthritis    • Elevated cholesterol    • GERD (gastroesophageal reflux disease)     OCCASIONAL   • High blood pressure    • Kidney stone    • Sleep apnea     using c-pap   • Vitreous floater 2013     Past Surgical History:   Procedure Laterality Date   • COLONOSCOPY     • RHINOPLASTY     • SEPTOPLASTY  2013    Nasal surgery procedure (Nasal septoplasty.)          OT ASSESSMENT FLOWSHEET (last 12 hours)      Occupational Therapy Evaluation     Row Name 10/14/19 1406                   OT Evaluation Time/Intention    Subjective Information  complains of;pain  -RB        Document Type  evaluation  -RB        Mode of Treatment  co-treatment;occupational therapy;physical therapy  -RB        Patient Effort  good  -RB           General Information    Patient Profile Reviewed?  yes  -RB        Onset of Illness/Injury or Date of Surgery  10/14/19  -RB        Referring Physician  Humble Ling MD  -RB        Patient Observations  alert;cooperative;agree to therapy  -RB        General Observations of Patient  Supine in bed with IV and drain.   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  -RB           Cognitive Assessment/Intervention- PT/OT    Affect/Mental Status (Cognitive)  WFL  -RB        Orientation Status (Cognition)  oriented x 4  -RB        Follows Commands (Cognition)  WFL  -RB        Cognitive Function (Cognitive)  WFL  -RB           Safety Issues, Functional Mobility    Impairments Affecting Function (Mobility)  pain;strength  -RB           Bed Mobility Assessment/Treatment    Bed Mobility Assessment/Treatment  supine-sit;sit-supine  -RB        Supine-Sit [place] (Bed Mobility)  supervision  -RB        Sit-Supine [place] (Bed Mobility)  contact guard  -RB        Bed Mobility, Safety Issues  decreased use of legs for bridging/pushing  -RB        Assistive Device (Bed Mobility)  bed rails;head of bed elevated  -RB           Functional Mobility    Functional Mobility- Ind. Level  contact guard assist  -RB        Functional Mobility- Device  rolling walker  -RB        Functional Mobility-Distance (Feet)  88  -RB        Functional Mobility- Safety Issues  balance decreased during turns  -RB           Transfer Assessment/Treatment     Transfer Assessment/Treatment  sit-stand transfer;stand-sit transfer;toilet transfer  -RB           Sit-Stand Transfer    Sit-Stand [place] (Transfers)  contact guard  -RB        Assistive Device (Sit-Stand Transfers)  walker, front-wheeled  -RB           Stand-Sit Transfer    Stand-Sit [place] (Transfers)  contact guard  -RB        Assistive Device (Stand-Sit Transfers)  walker, front-wheeled  -RB           Toilet Transfer    Type (Toilet Transfer)  stand-sit;sit-stand  -RB        [place] Level (Toilet Transfer)  contact guard;stand by assist;verbal cues  -RB        Assistive Device (Toilet Transfer)  walker, front-wheeled;raised toilet seat;grab bars/safety frame  -RB           ADL Assessment/Intervention    BADL Assessment/Intervention  lower body dressing;toileting  -RB           Lower Body Dressing Assessment/Training    Lower Body Dressing [place] Level  lower body dressing skills;doff;don;socks;supervision;contact guard assist  -RB        Lower Body Dressing Position  edge of bed sitting  -RB           Toileting Assessment/Training    [place] Level (Toileting)  toileting skills;contact guard assist  -RB        Assistive Devices (Toileting)  raised toilet seat;dressing stick  -RB        Comment (Toileting)  Transfer to toilet - CGA.  -RB           BADL Safety/Performance    Impairments, BADL Safety/Performance  balance;coordination  -RB           General ROM    GENERAL ROM COMMENTS  B UE AROM was WNLs.  -RB           MMT (Manual Muscle Testing)    General MMT Comments  B UE strength was 4+ to 5/5.  -RB           Sensory Assessment/Intervention    Sensory General Assessment  no sensation deficits identified  -RB           Hearing Assessment    Hearing Status  -- deaf in R ear  -RB           Vision Assessment/Intervention    Visual Impairment/Limitations  corrective lenses full time  -RB           Positioning and Restraints    Pre-Treatment Position  in bed  -RB        Post Treatment  Position  bed  -RB        In Bed  supine;call light within reach;encouraged to call for assist  -RB           Pain Scale: Numbers Pre/Post-Treatment    Pain Scale: Numbers, Pretreatment  7/10  -RB        Pain Scale: Numbers, Post-Treatment  7/10  -RB        Pain Location - Side  Right  -RB        Pain Location  hip  -RB        Pain Intervention(s)  Medication (See MAR)  -RB           Wound 10/14/19 0837 Right anterior hip Incision    Wound - Properties Group Date first assessed: 10/14/19  -TB Time first assessed: 0837  -TB Present on Hospital Admission: N  -TB Side: Right  -TB Orientation: anterior  -TB Location: hip  -TB Primary Wound Type: Incision  -TB       Coping    Observed Emotional State  calm;cooperative  -RB           Plan of Care Review    Plan of Care Reviewed With  patient  -RB           Clinical Impression (OT)    Date of Referral to OT  10/14/19  -RB        OT Diagnosis  Impaired mob and ADLs.  -RB        Functional Level at Time of Evaluation (OT Eval)  Impaired mob and ADLs.  -RB        Criteria for Skilled Therapeutic Interventions Met (OT Eval)  yes;treatment indicated  -RB        Rehab Potential (OT Eval)  good, to achieve stated therapy goals  -RB        Therapy Frequency (OT Eval)  other (see comments) 5-7 days/wk  -RB        Predicted Duration of Therapy Intervention (Therapy Eval)  Until D/C or goals met.  -RB        Care Plan Review (OT)  evaluation/treatment results reviewed;care plan/treatment goals reviewed;risks/benefits reviewed;patient/other agree to care plan  -RB        Anticipated Equipment Needs at Discharge (OT)  bedside commode  -RB        Anticipated Discharge Disposition (OT)  home with assist;home with OP services  -RB           Vital Signs    Pre Systolic BP Rehab  172  -RB        Pre Treatment Diastolic BP  91  -RB        Post Systolic BP Rehab  176  -RB        Post Treatment Diastolic BP  94  -RB        Pretreatment Heart Rate (beats/min)  88  -RB        Posttreatment Heart  Rate (beats/min)  84  -RB        Pre SpO2 (%)  98  -RB        O2 Delivery Pre Treatment  room air  -RB        Post SpO2 (%)  97  -RB        O2 Delivery Post Treatment  room air  -RB        Pre Patient Position  Supine  -RB        Intra Patient Position  Standing  -RB        Post Patient Position  Prone  -RB           Planned OT Interventions    Planned Therapy Interventions (OT Eval)  activity tolerance training;BADL retraining;adaptive equipment training;functional balance retraining;occupation/activity based interventions;patient/caregiver education/training;ROM/therapeutic exercise;strengthening exercise;transfer/mobility retraining  -RB           OT Goals    Bed Mobility Goal Selection (OT)  bed mobility, OT goal 1  -RB        Transfer Goal Selection (OT)  transfer, OT goal 1  -RB        Bathing Goal Selection (OT)  bathing, OT goal 1  -RB        Dressing Goal Selection (OT)  dressing, OT goal 1  -RB           Bed Mobility Goal 1 (OT)    Activity/Assistive Device (Bed Mobility Goal 1, OT)  bed mobility activities, all  -RB        [place] Level/Cues Needed (Bed Mobility Goal 1, OT)  conditional independence  -RB        Time Frame (Bed Mobility Goal 1, OT)  long term goal (LTG)  -RB        Progress/Outcomes (Bed Mobility Goal 1, OT)  goal not met  -RB           Transfer Goal 1 (OT)    Activity/Assistive Device (Transfer Goal 1, OT)  transfers, all  -RB        [place] Level/Cues Needed (Transfer Goal 1, OT)  supervision required  -RB        Time Frame (Transfer Goal 1, OT)  long term goal (LTG)  -RB        Progress/Outcome (Transfer Goal 1, OT)  goal not met  -RB           Bathing Goal 1 (OT)    Activity/Assistive Device (Bathing Goal 1, OT)  bathing skills, all  -RB        [place] Level/Cues Needed (Bathing Goal 1, OT)  contact guard assist  -RB        Time Frame (Bathing Goal 1, OT)  long term goal (LTG)  -RB        Progress/Outcomes (Bathing Goal 1, OT)  goal not met  -RB           Dressing Goal 1  (OT)    Activity/Assistive Device (Dressing Goal 1, OT)  dressing skills, all  -RB        [place]/Cues Needed (Dressing Goal 1, OT)  supervision required  -RB        Time Frame (Dressing Goal 1, OT)  long term goal (LTG)  -RB        Progress/Outcome (Dressing Goal 1, OT)  goal not met  -RB           Patient Education Goal (OT)    Activity (Patient Education Goal, OT)  Home safety/fall prev and use of ADL adaptive devices.  -RB        [place]/Cues/Accuracy (Memory Goal 2, OT)  demonstrates adequately;verbalizes understanding  -RB        Time Frame (Patient Education Goal, OT)  long term goal (LTG)  -RB        Progress/Outcome (Patient Education Goal, OT)  goal not met  -RB           Living Environment    Home Accessibility  stairs to enter home  -RB          User Key  (r) = Recorded By, (t) = Taken By, (c) = Cosigned By    Initials Name Effective Dates    RB Manny Felix, OT 07/24/19 -     TB Regan Woodall RN 10/17/16 -          Occupational Therapy Education     Title: PT OT SLP Therapies (In Progress)     Topic: Occupational Therapy (In Progress)     Point: Precautions (Done)     Description: Instruct learner(s) on prescribed precautions during self-care and functional transfers.    Learning Progress Summary           Patient Acceptance, E, VU by RB at 10/14/2019  4:04 PM    Comment:  Edu pt on use of gait belt and non skid socks when OOB and no OOB without assist.                               User Key     Initials Effective Dates Name Provider Type Discipline     07/24/19 -  Manny Felix, OT Occupational Therapist OT                  OT Recommendation and Plan  Outcome Summary/Treatment Plan (OT)  Anticipated Equipment Needs at Discharge (OT): bedside commode  Anticipated Discharge Disposition (OT): home with assist, home with OP services  Planned Therapy Interventions (OT Eval): activity tolerance training, BADL retraining, adaptive equipment training, functional balance retraining,  occupation/activity based interventions, patient/caregiver education/training, ROM/therapeutic exercise, strengthening exercise, transfer/mobility retraining  Therapy Frequency (OT Eval): other (see comments)(5-7 days/wk)  Plan of Care Review  Plan of Care Reviewed With: patient  Plan of Care Reviewed With: patient  Outcome Summary: OT eval on this date as co-eval with PT.  Bed mob with supervision to CGA.  Pt required CGA for sit to stand, 88' ambulation and toilet transfers.  Supervision to CGA required to don/doff L sock.  Pt limited by decreased ROM, strength, endurance and balance.  He could benefit from OT services to regain lost function for ADLs and functional mobililty.  Rec. outpatient PT when D/C from hospital.    Outcome Measures     Row Name 10/14/19 1407 10/14/19 1406          How much help from another person do you currently need...    Turning from your back to your side while in flat bed without using bedrails?  3  -CB  --     Moving from lying on back to sitting on the side of a flat bed without bedrails?  3  -CB  --     Moving to and from a bed to a chair (including a wheelchair)?  3  -CB  --     Standing up from a chair using your arms (e.g., wheelchair, bedside chair)?  3  -CB  --     Climbing 3-5 steps with a railing?  3  -CB  --     To walk in hospital room?  3  -CB  --     AM-PAC 6 Clicks Score (PT)  18  -CB  --        How much help from another is currently needed...    Putting on and taking off regular lower body clothing?  --  3  -RB     Bathing (including washing, rinsing, and drying)  --  2  -RB     Toileting (which includes using toilet bed pan or urinal)  --  3  -RB     Putting on and taking off regular upper body clothing  --  4  -RB     Taking care of personal grooming (such as brushing teeth)  --  4  -RB     Eating meals  --  4  -RB     AM-PAC 6 Clicks Score (OT)  --  20  -RB        Functional Assessment    Outcome Measure Options  AM-PAC 6 Clicks Basic Mobility (PT)  -CB  AM-PAC 6  Clicks Daily Activity (OT)  -RB       User Key  (r) = Recorded By, (t) = Taken By, (c) = Cosigned By    Initials Name Provider Type    CB Elena Dominguez, PT Physical Therapist    RB Manny Felix OT Occupational Therapist          Time Calculation:   Time Calculation- OT     Row Name 10/14/19 1611             Time Calculation- OT    OT Start Time  1407  -RB      OT Stop Time  1441  -RB      OT Time Calculation (min)  34 min  -RB      OT Received On  10/14/19  -RB      OT Goal Re-Cert Due Date  10/27/19  -RB        User Key  (r) = Recorded By, (t) = Taken By, (c) = Cosigned By    Initials Name Provider Type    RB Manny Felix OT Occupational Therapist        Therapy Charges for Today     Code Description Service Date Service Provider Modifiers Qty    [id number] HC OT EVAL MOD COMPLEXITY 2 10/14/2019 Manny Felix OT GO 1               Manny Felix OT  10/14/2019

## 2019-10-14 NOTE — ANESTHESIA POSTPROCEDURE EVALUATION
Patient: Sam Infante    Procedure Summary     Date:  10/14/19 Room / Location:  [facility] OR  / [facility] OR    Anesthesia Start:  0707 Anesthesia Stop:  0944    Procedure:  TOTAL HIP ARTHROPLASTY ANTERIOR (Right Hip) Diagnosis:       Right hip pain      Primary osteoarthritis of right hip      Essential hypertension      (Right hip pain [M25.551])      (Primary osteoarthritis of right hip [M16.11])      (Essential hypertension [I10])    Surgeon:  Nathaniel Kate MD Provider:  Jamel Nixon MD    Anesthesia Type:  spinal, MAC ASA Status:  3          Anesthesia Type: spinal, MAC  Last vitals  BP   (!) 199/105 (10/14/19 0701)   Temp   97.7 °F (36.5 °C) (10/14/19 0612)   Pulse   90 (10/14/19 0701)   Resp   20 (10/14/19 0612)     SpO2   97 % (10/14/19 0612)     Post Anesthesia Care and Evaluation    Patient location during evaluation: PACU  Level of consciousness: sleepy but conscious  Pain score: 0  Pain management: adequate  Airway patency: patent  Anesthetic complications: No anesthetic complications  PONV Status: none  Cardiovascular status: acceptable and hemodynamically stable  Respiratory status: acceptable and spontaneous ventilation  Hydration status: acceptable

## 2019-10-14 NOTE — OP NOTE
TOTAL HIP ARTHROPLASTY ANTERIOR  Procedure Note    Name:    Sam Infante  YOB: 1955  Date of surgery:   10/14/2019    Pre-op Diagnosis:   Right hip pain [M25.551]  Primary osteoarthritis of right hip [M16.11]  Essential hypertension [I10]    Post-op Diagnosis:    Post-Op Diagnosis Codes:     * Right hip pain [M25.551]     * Primary osteoarthritis of right hip [M16.11]     * Essential hypertension [I10]    Procedure:  Procedure(s):  TOTAL HIP ARTHROPLASTY ANTERIOR -right    Surgeon:  Surgeon(s):  Nathaniel Kate MD    ASSISTANT:  Lexi Leija CSA    Anesthesia: Spinal    Staff:   Circulator: Regan Woodall RN  Radiology Technologist: Roosevelt Dailey  Scrub Person: Julissa Lei  Assistant: Lexi Leija CSA    Estimated Blood Loss: 400 mL    Specimens:                ID Type Source Tests Collected by Time   A : RIGHT FEMORAL HEAD AND SOFT TISSUE Bone Hip, Right TISSUE PATHOLOGY EXAM Nathaniel Kate MD 10/14/2019 0751         Drains:   Closed/Suction Drain 1 Right;Anterior Hip 7 Fr. (Active)       [REMOVED] Urethral Catheter Latex 16 Fr. (Removed)       Complications: None    IMPLANTS:   Implant Name Type Inv. Item Serial No.  Lot No. LRB No. Used   CUP ACET PINN SECTOR W/GRIPTN M/HL 50MM - [id number] Implant CUP ACET PINN SECTOR W/GRIPTN M/HL 50MM  DEPUY [id number] Right 1   SCRW CANC PINN 6.5X30MM - [id number] Implant SCRW CANC PINN 6.5X30MM  DEPUY [id number] Right 1   SCRW CANC PINN 6.5X25MM - [id number] Implant SCRW CANC PINN 6.5X25MM  DEPUY [id number] Right 1   LINER ACET PINN ALTRX NTRL [id number] - [id number] Implant LINER ACET PINN ALTRX NTRL [id number]  DEPUY [id number] Right 1   STEM FEM CORAIL CMTLS W/COL AMT SZ11 - [id number] Implant STEM FEM CORAIL CMTLS W/COL AMT SZ11  DEPUY [id number] Right 1   HD FEM BIOLOXDELTA/ART CERAM 32MM PLS5 - [id number] Implant HD FEM BIOLOXDELTA/ART CERAM 32MM PLS5  [facility] [id number] Right 1         PROCEDURE:  The patient was taken to the  "operating room and placed in the supine position. A sequential compression device was carefully placed on the non-operative leg. Preoperative antibiotics were administered. Surgical time out was performed. After adequate induction of anesthesia, the feet were padded and placed in the [place] table boots. The patient was then transferred onto the [place] table and positioned appropriately. The Right hip was then prepped and draped in the usual sterile fashion.   An incision was then made starting 2 cm lateral to the ASIS heading distal and lateral at approximately 30 degrees. The subcutaneous fat was then divided down to the fascia overlying the tensor fascia sarai (TFL) muscle. This was sharply divided staying a few cm lateral to the interval between the sartorius and the TFL muscle. This interval was then bluntly dissected. The circumflex vessels were then identified, cauterized, and divided. There was excellent hemostasis. Cobra retractors were then placed around the femoral neck capsule. The anterior capsule was then resected. The retractors were then placed intracapsular and the the femoral neck was identified. The arthritic change was noted to be moderate in the femoral head and along the rim of the acetabulum. The femoral neck cut was made and the femoral head was then engaged with the corkscrew and removed.  In attempting to remove the femoral head with the corkscrew, the femoral head just disintegrated and fell apart.  It came out in multiple pieces.  There is no pathologic change to the interior of the femoral head but it just had no inherent consistency.   There were significant osteophytes noted around the periphery of the head fragments.   The acetabulum was then exposed with \"number 7\" retractors. The acetabulum was then addressed with C-arm imaging and the acetabular reamers. We reamed out the medial osteophyte and then up to a solid 'rim' fit.  A 48 mm reamer had a super \"bite\" and advanced slightly more " than intended.  The reamings from the previous reamers were then utilized to fill this void after a 49 and 50 mm reamer were used to gain a good rim fit.  A trial cup was then impacted into position with good fixation. The trial was removed and the hip copiously irrigated. The final acetabular implant was then placed and impacted into position with C-arm guidance.  It was felt to have an excellent rim fit.  Two acetabular screws were then placed with excellent purchase to supplement cup stability. The final liner was then placed and then the wound was irrigated once again.    Attention was turned to the femur. The femoral elevator hook was placed. The leg was then moved to the external rotation, extension, and adduction position. Retractors were placed around the proximal femur and then the posterior capsule and conjoined tendon was the Released. The box osteotome was then used to creat the starting hole. The femur was then prepared using the chili-pepper broach and then we progressively broached up the final broach which fit nicely with excellent rotational and axial stability. The trial neck and head were then placed and the hip was then reduced and c-arm images were taken which confirmed appropriate fit and position of the implants. There were no complicating factors noted, and flouro images were taken which confirmed proper restoration of leg length and offset. The trial components were removed, the hip was copiously irrigated and the final implants were then seated. The hip was then reduced and found to be stable.  However, there was some laxity once the final implant was placed.  It was felt that the 1 mm femoral head allowed for too much laxity and did not reestablish the muscle tension.  C-arm imaging showed that it appeared that the neck was slightly short.  Therefore it was felt necessary to remove the 1 mm head and replace it with a 5 mm head.  The hip was felt to be much more stable and this situation  and the muscle tension was much improved.  C-arm images again confirmed appropriate anatomy restoration without complicating factors noted.    The hip was then copiously irrigated.  There was excellent hemostasis. A small hemovac drain was then placed within the joint and then the wound was irrigated once again. . We closed the hip in multiple layers in standard fashion. Sterile dressing were applied. At the end of the case, the sponge and needle counts were reported as being correct. There were no known complications. The patient was then transported to the recovery room.          10/14/19 at 9:52 AM by Nathaniel Kate MD

## 2019-10-15 ENCOUNTER — ANTICOAGULATION VISIT (OUTPATIENT)
Dept: PHARMACY | Facility: HOSPITAL | Age: 64
End: 2019-10-15

## 2019-10-15 VITALS
HEART RATE: 92 BPM | SYSTOLIC BLOOD PRESSURE: 153 MMHG | TEMPERATURE: 97.7 F | OXYGEN SATURATION: 98 % | BODY MASS INDEX: 30.47 KG/M2 | RESPIRATION RATE: 18 BRPM | HEIGHT: 66 IN | DIASTOLIC BLOOD PRESSURE: 77 MMHG | WEIGHT: 189.6 LBS

## 2019-10-15 LAB
INR PPP: 1.01 (ref 0.8–1.2)
LAB AP CASE REPORT: NORMAL
PATH REPORT.FINAL DX SPEC: NORMAL
PATH REPORT.GROSS SPEC: NORMAL
PROTHROMBIN TIME: 13.1 SECONDS (ref 11.1–15.3)

## 2019-10-15 PROCEDURE — 97116 GAIT TRAINING THERAPY: CPT

## 2019-10-15 PROCEDURE — 25010000002 INFLUENZA VAC SUBUNIT QUAD 0.5 ML SUSPENSION PREFILLED SYRINGE: Performed by: ORTHOPAEDIC SURGERY

## 2019-10-15 PROCEDURE — 90674 CCIIV4 VAC NO PRSV 0.5 ML IM: CPT | Performed by: ORTHOPAEDIC SURGERY

## 2019-10-15 PROCEDURE — 97110 THERAPEUTIC EXERCISES: CPT

## 2019-10-15 PROCEDURE — 85610 PROTHROMBIN TIME: CPT | Performed by: ORTHOPAEDIC SURGERY

## 2019-10-15 PROCEDURE — 99024 POSTOP FOLLOW-UP VISIT: CPT | Performed by: ORTHOPAEDIC SURGERY

## 2019-10-15 PROCEDURE — 97530 THERAPEUTIC ACTIVITIES: CPT

## 2019-10-15 PROCEDURE — G0008 ADMIN INFLUENZA VIRUS VAC: HCPCS | Performed by: ORTHOPAEDIC SURGERY

## 2019-10-15 RX ORDER — OXYCODONE AND ACETAMINOPHEN 7.5; 325 MG/1; MG/1
1 TABLET ORAL EVERY 4 HOURS PRN
Qty: 30 TABLET | Refills: 0 | Status: SHIPPED | OUTPATIENT
Start: 2019-10-15 | End: 2019-10-31 | Stop reason: SDUPTHER

## 2019-10-15 RX ORDER — WARFARIN SODIUM 5 MG/1
TABLET ORAL
Qty: 30 TABLET | Refills: 1 | Status: SHIPPED | OUTPATIENT
Start: 2019-10-15 | End: 2019-11-05

## 2019-10-15 RX ADMIN — OXYCODONE HYDROCHLORIDE 5 MG: 5 TABLET ORAL at 03:46

## 2019-10-15 RX ADMIN — SENNOSIDES AND DOCUSATE SODIUM 2 TABLET: 8.6; 5 TABLET ORAL at 08:29

## 2019-10-15 RX ADMIN — FAMOTIDINE 40 MG: 40 TABLET ORAL at 08:29

## 2019-10-15 RX ADMIN — VERAPAMIL HYDROCHLORIDE 360 MG: 180 TABLET, FILM COATED, EXTENDED RELEASE ORAL at 08:29

## 2019-10-15 RX ADMIN — FERROUS SULFATE TAB EC 324 MG (65 MG FE EQUIVALENT) 324 MG: 324 (65 FE) TABLET DELAYED RESPONSE at 08:29

## 2019-10-15 RX ADMIN — ACETAMINOPHEN 1000 MG: 500 TABLET ORAL at 11:08

## 2019-10-15 RX ADMIN — ACETAMINOPHEN 1000 MG: 500 TABLET ORAL at 08:29

## 2019-10-15 RX ADMIN — Medication 3 ML: at 10:29

## 2019-10-15 RX ADMIN — METOPROLOL SUCCINATE 50 MG: 50 TABLET, EXTENDED RELEASE ORAL at 08:29

## 2019-10-15 RX ADMIN — INFLUENZA A VIRUS A/IDAHO/07/2018 (H1N1) ANTIGEN (MDCK CELL DERIVED, PROPIOLACTONE INACTIVATED, INFLUENZA A VIRUS A/INDIANA/08/2018 (H3N2) ANTIGEN (MDCK CELL DERIVED, PROPIOLACTONE INACTIVATED), INFLUENZA B VIRUS B/SINGAPORE/INFTT-16-0610/2016 ANTIGEN (MDCK CELL DERIVED, PROPIOLACTONE INACTIVATED), INFLUENZA B VIRUS B/IOWA/06/2017 ANTIGEN (MDCK CELL DERIVED, PROPIOLACTONE INACTIVATED) 0.5 ML: 15; 15; 15; 15 INJECTION, SUSPENSION INTRAMUSCULAR at 11:07

## 2019-10-15 NOTE — PROGRESS NOTES
Orthopedic Total Hip Progress Note      Patient: Sam Infante  Date of Admission: 10/14/2019  YOB: 1955  Medical Record Number: [id number]    LOS: 1    Status Post: Procedure(s) (LRB):  TOTAL HIP ARTHROPLASTY ANTERIOR (Right)    Systemic or Specific Complaints: No Complaints  Complications: None  Pain Relief: complete resolution    Physical Exam:  64 y.o.  male  alert and oriented  Vitals:    10/14/19 2158 10/14/19 2342 10/14/19 2345 10/15/19 0348   BP: 162/90 (!) 182/94 172/90 160/100   BP Location: Right arm Right arm  Right arm   Patient Position: Lying Lying  Lying   Pulse:  96  95   Resp:  18  18   Temp:  96.7 °F (35.9 °C)  96.7 °F (35.9 °C)   TempSrc:  Oral  Oral   SpO2:  96%  97%   Weight:       Height:           Abd: Soft, NT, with BS +  Ext: NV intact. ROM appropriate. Calf is soft and nontender. Negative Homans Sn  Skin: Dressing clean dry and intact w/out signs or  symptoms of infection.    Activity: Mobilizing Per P.T.   Weight Bearing: As Tolerated    Data Review  Admission on 10/14/2019   Component Date Value Ref Range Status   • ABO Type 10/14/2019 O   Final   • RH type 10/14/2019 Negative   Final   • Antibody Screen 10/14/2019 Negative   Final   • T&S Expiration Date 10/14/2019 10/17/2019 11:59:59 PM   Final   • Previous History 10/14/2019 Previous Record on File   Final   • Hemoglobin 10/14/2019 13.3  13.0 - 17.7 g/dL Final   • Hematocrit 10/14/2019 38.4  37.5 - 51.0 % Final       No results found.    Medications    acetaminophen 1,000 mg Oral 4x Daily   famotidine 40 mg Oral Daily   ferrous sulfate 324 mg Oral Daily With Breakfast   latanoprost 1 drop Both Eyes Nightly   metoprolol succinate XL 50 mg Oral Daily   sennosides-docusate sodium 2 tablet Oral BID   sodium chloride 3 mL Intravenous Q12H   verapamil  mg Oral Daily   warfarin 5 mg Oral Daily     HYDROmorphone **AND** naloxone  •  magnesium hydroxide  •  ondansetron **OR** ondansetron  •  oxyCODONE  •  Pharmacy to  dose warfarin  •  sodium chloride    Assessment:  Doing well following total hip replacement POD 1 Day Post-Op    Patient Active Problem List   Diagnosis   • Borderline glaucoma, open angle with borderline findings   • Borderline glaucoma   • Left hand pain   • Bilateral carpal tunnel syndrome   • Right hip pain   • Primary osteoarthritis of right hip   • Essential hypertension         Plan:   Anterior hip precautions  Continue efforts to mobilize  Continue Pain Control Measures  Continue incisional Care  DVT prophylaxis    Discharge Plan:Home today anticipated with outpatient PT at [facility]    Nathaniel Kate MD    Date: 10/15/2019   Time: 7:46 AM

## 2019-10-15 NOTE — THERAPY TREATMENT NOTE
Acute Care - Occupational Therapy Treatment Note  [facility]     Patient Name: Sam Infante  : 1955  MRN: [id number]  Today's Date: 10/15/2019  Onset of Illness/Injury or Date of Surgery: 10/14/19  Date of Referral to OT: 10/14/19  Referring Physician: Dr. Kate    Admit Date: 10/14/2019       ICD-10-CM ICD-9-CM   1. Primary osteoarthritis of right hip M16.11 715.15   2. Right hip pain M25.551 719.45   3. Essential hypertension I10 401.9   4. Impaired physical mobility Z74.09 781.99   5. Impaired mobility and activities of daily living Z74.09 799.89   6. Long term (current) use of anticoagulants Z79.01 V58.61     Patient Active Problem List   Diagnosis   • Borderline glaucoma, open angle with borderline findings   • Borderline glaucoma   • Left hand pain   • Bilateral carpal tunnel syndrome   • Right hip pain   • Primary osteoarthritis of right hip   • Essential hypertension     Past Medical History:   Diagnosis Date   • Arthritis    • Elevated cholesterol    • GERD (gastroesophageal reflux disease)     OCCASIONAL   • High blood pressure    • Kidney stone    • Sleep apnea     using c-pap   • Vitreous floater 2013     Past Surgical History:   Procedure Laterality Date   • COLONOSCOPY     • RHINOPLASTY     • SEPTOPLASTY  2013    Nasal surgery procedure (Nasal septoplasty.)       Therapy Treatment    Rehabilitation Treatment Summary     Row Name 10/15/19 1043 10/15/19 0924          Treatment Time/Intention    Discipline  occupational therapy assistant  -CS  physical therapy assistant  -KRISTAN     Document Type  therapy note (daily note)  -CS  therapy note (daily note)  -KRISTAN     Subjective Information  no complaints  -CS  --     Mode of Treatment  occupational therapy  -CS  co-treatment;physical therapy;occupational therapy  -JC2     Therapy Frequency (PT Clinical Impression)  --  -- 6x week  -JC2     Therapy Frequency (OT Eval)  daily  -CS  --     Patient Effort  good  -CS  good  -JC2      Comment  --  nsg states to continue in regards to pts elevated BP. MD is aware. pt is to follow up with cardiologist after DC.   -KRISTAN     Existing Precautions/Restrictions  fall;hip, anterior  -CS  fall;hip, anterior  -JC2     Recorded by [CS] Genna Rosado ANTOINE/L 10/15/19 1314 [KRISTAN] Bernard Cortez, PTA 10/15/19 0935  [JC2] Bernard Cortez, PTA 10/15/19 0932     Row Name 10/15/19 1043 10/15/19 0924          Vital Signs    Pre Systolic BP Rehab  --  176 taken manually in L arm, 180/98 manually R arm.   -KRISTAN     Pre Treatment Diastolic BP  --  104  -KRISTAN     Post Systolic BP Rehab  --  170  -JC2     Post Treatment Diastolic BP  --  92  -JC2     Pretreatment Heart Rate (beats/min)  91  -CS  98  -KRISTAN     Posttreatment Heart Rate (beats/min)  --  93  -JC2     Pre SpO2 (%)  97  -CS  98  -KRISTAN     O2 Delivery Pre Treatment  room air  -CS  room air  -KRISTAN     Post SpO2 (%)  --  97  -JC2     O2 Delivery Post Treatment  --  room air  -JC2     Pre Patient Position  Supine  -CS  --     Post Patient Position  Supine  -CS  --     Recorded by [CS] Genna Rosado ANTOINE/L 10/15/19 1314 [KRISTAN] Bernard Cortez, PTA 10/15/19 0935  [JC2] Bernard Cortez, PTA 10/15/19 1009     Row Name 10/15/19 1043 10/15/19 0924          Cognitive Assessment/Intervention- PT/OT    Affect/Mental Status (Cognitive)  WFL  -CS  WFL  -KRISTAN     Orientation Status (Cognition)  oriented x 4  -CS  oriented x 4  -KRISTAN     Follows Commands (Cognition)  WFL  -CS  WFL  -KRISTAN     Cognitive Function (Cognitive)  WFL  -CS  WFL  -KRISTAN     Recorded by [CS] Genna Rosado ANTOINE/L 10/15/19 1314 [KRISTAN] Bernard Cortez, PTA 10/15/19 0932     Row Name 10/15/19 0924             Safety Issues, Functional Mobility    Impairments Affecting Function (Mobility)  pain;strength  -KRISTAN      Recorded by [KRISTAN] Bernard Cortez, PTA 10/15/19 0932      Row Name 10/15/19 0924             Bed Mobility Assessment/Treatment    Bed Mobility Assessment/Treatment  scooting/bridging;supine-sit;sit-supine   -KRISTAN      Scooting/Bridging Breeding (Bed Mobility)  conditional independence  -KRISTAN      Supine-Sit Breeding (Bed Mobility)  independent  -KRISTAN      Sit-Supine Breeding (Bed Mobility)  independent  -KRISTAN      Bed Mobility, Safety Issues  decreased use of legs for bridging/pushing  -JC2      Assistive Device (Bed Mobility)  --  -KRISTAN      Comment (Bed Mobility)  HOB flat. no bed rails.   -KRISTAN      Recorded by [KRISTAN] Bernard Cortez, PTA 10/15/19 1009  [JC2] Bernard Cortez, PTA 10/15/19 0932      Row Name 10/15/19 0924             Transfer Assessment/Treatment    Transfer Assessment/Treatment  sit-stand transfer;stand-sit transfer;bed-chair transfer;chair-bed transfer  -KRISTAN      Recorded by [KRISTAN] Bernard Cortez, PTA 10/15/19 1009      Row Name 10/15/19 0924             Bed-Chair Transfer    Bed-Chair Breeding (Transfers)  conditional independence  -KRISTAN      Assistive Device (Bed-Chair Transfers)  walker, front-wheeled  -KRISTAN      Recorded by [KRISTAN] Bernard Cortez, PTA 10/15/19 1009      Row Name 10/15/19 0924             Chair-Bed Transfer    Chair-Bed Breeding (Transfers)  contact guard  -KRISTAN      Assistive Device (Chair-Bed Transfers)  walker, front-wheeled  -KRISTAN      Recorded by [KRISTAN] Bernard Cortez, PTA 10/15/19 1009      Row Name 10/15/19 0924             Sit-Stand Transfer    Sit-Stand Breeding (Transfers)  contact guard  -KRISTAN      Assistive Device (Sit-Stand Transfers)  walker, front-wheeled  -KRISTAN      Recorded by [KRISTAN] Bernard Cortez, PTA 10/15/19 0932      Row Name 10/15/19 0924             Stand-Sit Transfer    Stand-Sit Breeding (Transfers)  contact guard  -KRISTAN      Assistive Device (Stand-Sit Transfers)  walker, front-wheeled  -KRISTAN      Recorded by [KRISTAN] Bernard Cortez, PTA 10/15/19 0932      Row Name 10/15/19 0924             Toilet Transfer    Type (Toilet Transfer)  stand-sit;sit-stand  -KRISTAN      [place] Level (Toilet Transfer)  contact guard;stand by assist;verbal cues  -KRISTAN       Assistive Device (Toilet Transfer)  walker, front-wheeled  -KRISTAN      Recorded by [KRISTAN] Bernard Cortez, PTA 10/15/19 0932      Row Name 10/15/19 0924             Gait/Stairs Assessment/Training    Gait/Stairs Assessment/Training  gait/ambulation independence;gait/ambulation assistive device;distance ambulated  -KRISTAN      [place] Level (Gait)  contact guard  -KRISTAN      Assistive Device (Gait)  walker, front-wheeled  -KRISTAN      Recorded by [KRISTAN] Bernard Cortez, PTA 10/15/19 0932      Row Name 10/15/19 1043             ADL Assessment/Intervention    BADL Assessment/Intervention  bathing;upper body dressing;lower body dressing  -CS      Recorded by [CS] Genna Rosado COTA/L 10/15/19 1314      Row Name 10/15/19 1043             Bathing Assessment/Intervention    Comment (Bathing)  pt deferred  -CS      Recorded by [CS] Genna Rosado COTA/L 10/15/19 1314      Row Name 10/15/19 1043             Motor Skills Assessment/Interventions    Additional Documentation  Therapeutic Exercise (Group)  -CS      Recorded by [CS] Genna Rosado ANTOINE/L 10/15/19 1314      Row Name 10/15/19 1043             Therapeutic Exercise    Upper Extremity (Therapeutic Exercise)  bicep curl, bilateral  -CS      Upper Extremity Range of Motion (Therapeutic Exercise)  shoulder flexion/extension, bilateral;shoulder abduction/adduction, bilateral;shoulder horizontal abduction/adduction, bilateral;elbow flexion/extension, bilateral  -CS      Weight/Resistance (Therapeutic Exercise)  green  -CS      Exercise Type (Therapeutic Exercise)  AROM (active range of motion)  -CS      Position (Therapeutic Exercise)  seated  -CS      Sets/Reps (Therapeutic Exercise)  1/20  -CS      Equipment (Therapeutic Exercise)  resistive bands  -CS      Expected Outcome (Therapeutic Exercise)  improve functional tolerance, self-care activity;improve performance, transfer skills;improve performance, BADLs  -CS      Recorded by [CS] Genna Rosado COTA/NARAYAN 10/15/19  1314      Row Name 10/15/19 1043             Positioning and Restraints    Pre-Treatment Position  in bed  -CS      Post Treatment Position  bed  -CS      In Bed  fowlers;call light within reach;encouraged to call for assist;exit alarm on  -CS      Recorded by [CS] Genna Rosado COTA/L 10/15/19 1314      Row Name 10/15/19 1043 10/15/19 0924          Pain Scale: Numbers Pre/Post-Treatment    Pain Scale: Numbers, Pretreatment  2/10  -CS  3/10  -KRISTAN     Pain Scale: Numbers, Post-Treatment  2/10  -CS  4/10  -KRISTAN     Pain Location - Side  Right  -CS  Right  -JC2     Pain Location  hip  -CS  hip  -JC2     Pain Intervention(s)  --  Repositioned  -KRISTAN     Recorded by [CS] Genna Rosado COTA/NARAYAN 10/15/19 1314 [KRISTAN] Bernard Cortez, PTA 10/15/19 1009  [JC2] Bernard Cortez, PTA 10/15/19 0932     Row Name 10/15/19 0924             Sensory Assessment/Intervention    Sensory General Assessment  no sensation deficits identified  -KRISTAN      Recorded by [KRISTAN] Bernard Cortez, PTA 10/15/19 0932      Row Name 10/15/19 0924             Hearing Assessment    Hearing Status  -- deaf in R ear  -KRISTAN      Recorded by [KRISTAN] Bernard Cortez, PTA 10/15/19 0932      Row Name 10/15/19 0924             Vision Assessment/Intervention    Visual Impairment/Limitations  corrective lenses full time  -KRISTAN      Recorded by [KRISTAN] Bernard Cortez, PTA 10/15/19 0932      Row Name                Wound 10/14/19 0837 Right anterior hip Incision    Wound - Properties Group Date first assessed: 10/14/19 [TB] Time first assessed: 0837 [TB] Present on Hospital Admission: N [TB] Side: Right [TB] Orientation: anterior [TB] Location: hip [TB] Primary Wound Type: Incision [TB] Recorded by:  [TB] Regan Woodall RN 10/14/19 0838    Row Name 10/15/19 0924             Coping    Observed Emotional State  calm;cooperative  -KRISTAN      Recorded by [KRISTAN] Bernard Cortez, PTA 10/15/19 0932      Row Name 10/15/19 1043             Outcome Summary/Treatment Plan (OT)    Daily  Summary of Progress (OT)  progress toward functional goals is good  -CS      Plan for Continued Treatment (OT)  cont OT POC  -CS      Anticipated Discharge Disposition (OT)  home with assist;home with OP services  -CS      Recorded by [CS] Genna Rosado ANTOINE/L 10/15/19 1314      Row Name 10/15/19 0924             Outcome Summary/Treatment Plan (PT)    Anticipated Equipment Needs at Discharge (PT)  bedside commode  -KRISTAN      Anticipated Discharge Disposition (PT)  home with OP services  -KRISTAN      Referral Needed to Another Service (PT)  -- out patient PT  -KRISTAN      Recorded by [KRISTAN] Bernard Cortez, PTA 10/15/19 0932        User Key  (r) = Recorded By, (t) = Taken By, (c) = Cosigned By    Initials Name Effective Dates Discipline    KRISTAN Bernard Cortez, PTA 03/07/18 -  PT    CS Genna Rosado ANTOINE/L 03/07/18 -  OT    TB Regan Woodall, RN 10/17/16 -  Nurse        Wound 10/14/19 0837 Right anterior hip Incision (Active)   Dressing Appearance dry;intact 10/15/2019  8:26 AM   Closure GASTON 10/14/2019  8:11 PM   Base dry;clean 10/15/2019  8:26 AM   Drainage Amount none 10/15/2019  8:26 AM     Rehab Goal Summary     Row Name 10/15/19 1043             Occupational Therapy Goals    Bed Mobility Goal Selection (OT)  bed mobility, OT goal 1  -CS      Transfer Goal Selection (OT)  transfer, OT goal 1  -CS      Bathing Goal Selection (OT)  bathing, OT goal 1  -CS      Dressing Goal Selection (OT)  dressing, OT goal 1  -CS         Bed Mobility Goal 1 (OT)    Activity/Assistive Device (Bed Mobility Goal 1, OT)  bed mobility activities, all  -CS      [place] Level/Cues Needed (Bed Mobility Goal 1, OT)  conditional independence  -CS      Time Frame (Bed Mobility Goal 1, OT)  long term goal (LTG)  -CS      Progress/Outcomes (Bed Mobility Goal 1, OT)  goal not met  -CS         Transfer Goal 1 (OT)    Activity/Assistive Device (Transfer Goal 1, OT)  transfers, all  -CS      [place] Level/Cues Needed (Transfer Goal 1, OT)   supervision required  -CS      Time Frame (Transfer Goal 1, OT)  long term goal (LTG)  -CS      Progress/Outcome (Transfer Goal 1, OT)  goal not met  -CS         Bathing Goal 1 (OT)    Activity/Assistive Device (Bathing Goal 1, OT)  bathing skills, all  -CS      [place] Level/Cues Needed (Bathing Goal 1, OT)  contact guard assist  -CS      Time Frame (Bathing Goal 1, OT)  long term goal (LTG)  -CS      Progress/Outcomes (Bathing Goal 1, OT)  goal not met  -CS         Dressing Goal 1 (OT)    Activity/Assistive Device (Dressing Goal 1, OT)  dressing skills, all  -CS      [place]/Cues Needed (Dressing Goal 1, OT)  supervision required  -CS      Time Frame (Dressing Goal 1, OT)  long term goal (LTG)  -CS      Progress/Outcome (Dressing Goal 1, OT)  goal not met  -CS         Patient Education Goal (OT)    Activity (Patient Education Goal, OT)  Home safety/fall prev and use of ADL adaptive devices.  -CS      [place]/Cues/Accuracy (Memory Goal 2, OT)  demonstrates adequately;verbalizes understanding  -CS      Time Frame (Patient Education Goal, OT)  long term goal (LTG)  -CS      Progress/Outcome (Patient Education Goal, OT)  goal not met  -CS        User Key  (r) = Recorded By, (t) = Taken By, (c) = Cosigned By    Initials Name Provider Type Discipline    CS Genna Rosado COTA/NARAYAN Occupational Therapy Assistant OT        Occupational Therapy Education     Title: PT OT SLP Therapies (In Progress)     Topic: Occupational Therapy (Done)     Point: ADL training (Done)     Description: Instruct learner(s) on proper safety adaptation and remediation techniques during self care or transfers.   Instruct in proper use of assistive devices.    Learning Progress Summary           Patient Acceptance, E,TB,D, VU by CRISTIANO at 10/15/2019  1:15 PM    Comment:  Pt was educated on HEP.                   Point: Home exercise program (Done)     Description: Instruct learner(s) on appropriate technique for monitoring, assisting  and/or progressing therapeutic exercises/activities.    Learning Progress Summary           Patient Acceptance, E,TB,D, VU by CS at 10/15/2019  1:15 PM    Comment:  Pt was educated on HEP.                   Point: Precautions (Done)     Description: Instruct learner(s) on prescribed precautions during self-care and functional transfers.    Learning Progress Summary           Patient Acceptance, E,TB,D, VU by CS at 10/15/2019  1:15 PM    Comment:  Pt was educated on HEP.    Acceptance, E, VU by  at 10/14/2019  4:04 PM    Comment:  Edu pt on use of gait belt and non skid socks when OOB and no OOB without assist.                   Point: Body mechanics (Done)     Description: Instruct learner(s) on proper positioning and spine alignment during self-care, functional mobility activities and/or exercises.    Learning Progress Summary           Patient Acceptance, E,TB,D, VU by  at 10/15/2019  1:15 PM    Comment:  Pt was educated on HEP.                               User Key     Initials Effective Dates Name Provider Type Discipline     07/24/19 -  Manny Felix, HAYLEE Occupational Therapist OT     03/07/18 -  Genna Rosado COTA/NARAYAN Occupational Therapy Assistant OT                OT Recommendation and Plan  Outcome Summary/Treatment Plan (OT)  Daily Summary of Progress (OT): progress toward functional goals is good  Plan for Continued Treatment (OT): cont OT POC  Anticipated Discharge Disposition (OT): home with assist, home with OP services  Therapy Frequency (OT Eval): daily  Daily Summary of Progress (OT): progress toward functional goals is good  Outcome Measures     Row Name 10/15/19 1300 10/14/19 1407 10/14/19 1406       How much help from another person do you currently need...    Turning from your back to your side while in flat bed without using bedrails?  --  3  -CB  --    Moving from lying on back to sitting on the side of a flat bed without bedrails?  --  3  -CB  --    Moving to and from a bed to a  chair (including a wheelchair)?  --  3  -CB  --    Standing up from a chair using your arms (e.g., wheelchair, bedside chair)?  --  3  -CB  --    Climbing 3-5 steps with a railing?  --  3  -CB  --    To walk in hospital room?  --  3  -CB  --    AM-PAC 6 Clicks Score (PT)  --  18  -CB  --       How much help from another is currently needed...    Putting on and taking off regular lower body clothing?  3  -CS  --  3  -RB    Bathing (including washing, rinsing, and drying)  2  -CS  --  2  -RB    Toileting (which includes using toilet bed pan or urinal)  3  -CS  --  3  -RB    Putting on and taking off regular upper body clothing  4  -CS  --  4  -RB    Taking care of personal grooming (such as brushing teeth)  4  -CS  --  4  -RB    Eating meals  4  -CS  --  4  -RB    AM-PAC 6 Clicks Score (OT)  20  -CS  --  20  -RB       Functional Assessment    Outcome Measure Options  --  AM-PAC 6 Clicks Basic Mobility (PT)  -CB  AM-PAC 6 Clicks Daily Activity (OT)  -RB      User Key  (r) = Recorded By, (t) = Taken By, (c) = Cosigned By    Initials Name Provider Type    CB Elena Dominguez, PT Physical Therapist    RB Manny Felix, OT Occupational Therapist    CS Genna Rosado COTA/NARAYAN Occupational Therapy Assistant           Time Calculation:   Time Calculation- OT     Row Name 10/15/19 1316             Time Calculation- OT    OT Start Time  1043  -CS      OT Stop Time  1058  -      OT Time Calculation (min)  15 min  -      Total Timed Code Minutes- OT  15 minute(s)  -      OT Received On  10/15/19  -        User Key  (r) = Recorded By, (t) = Taken By, (c) = Cosigned By    Initials Name Provider Type    CS Genna Rosado COTA/NARAYAN Occupational Therapy Assistant        Therapy Charges for Today     Code Description Service Date Service Provider Modifiers Qty    [id number] HC OT THER PROC EA 15 MIN 10/15/2019 Genna Rosado COTA/L GO 1               LORA Trejo  10/15/2019

## 2019-10-15 NOTE — DISCHARGE PLACEMENT REQUEST
"Delfin Infante (64 y.o. Male)     Date of Birth Social Security Number Address Home Phone MRN    1955  [address] [address] [phone] [id number]    L[employer] Marital Status          [employer]        Admission Date Admission Type Admitting Provider Attending Provider Department, Room/Bed    10/14/19 Elective Nathaniel Kate MD Dodds, James Carpenter, MD [address], [facility]/1    Discharge Date Discharge Disposition Discharge Destination         Home or Self Care              Attending Provider:  Nathaniel Kate MD    Allergies:  Statins    Isolation:  None   Infection:  None   Code Status:  CPR    Ht:  167.6 cm (66\")   Wt:  86 kg (189 lb 9.5 oz)    Admission Cmt:  None   Principal Problem:  Primary osteoarthritis of right hip [M16.11]                 Active Insurance as of 10/14/2019     Primary Coverage     Payor Plan Insurance Group Employer/Plan Group    [employer] [id number]     Payor Plan Address Payor Plan Phone Number Payor Plan Fax Number Effective Dates    [address] [phone]  2017 - None Entered    [place] [address]       Subscriber Name Subscriber Birth Date Member ID       DELFIN INFANTE 1955 [id number]                 Emergency Contacts      (Rel.) Home Phone Work Phone Mobile Phone    Veronica Infante (Spouse) [phone] -- [phone]        [address] [address]  Dept. Phone:  [phone]  Dept. Fax:   Date Ordered: Oct 15, 2019         Patient:  Delfin Infante MRN:  [id number]   [address]  [facility] [address] :  1955  SSN:    Phone: [phone] Sex:  M     Weight: 86 kg (189 lb 9.5 oz)         Ht Readings from Last 1 Encounters:   10/14/19 167.6 cm (66\")         Commode Chair          (Order ID: [id number])    Diagnosis:  Impaired mobility and " activities of daily living (Z74.09 [ICD-10-CM] 799.89 [ICD-9-CM])   Quantity:  1     Equipment:  Bedside Commode Chair w/Fixed Arms  Length of Need (99 Months = Lifetime): 99 Months = Lifetime        Authorizing Provider's Phone: [phone]   Verbal Order Mode: Telephone with readback   Authorizing Provider: Nathaniel Kate MD  Authorizing Provider's NPI: [id number]     Order Entered By: Jake Caro RN 10/15/2019 11:35 AM     Electronically signed by:               Operative/Procedure Notes (all)      Nathaniel Kate MD at 10/14/19 0645  Version 2 of 2         TOTAL HIP ARTHROPLASTY ANTERIOR  Procedure Note    Name:    Sam Infante  YOB: 1955  Date of surgery:   10/14/2019    Pre-op Diagnosis:   Right hip pain [M25.551]  Primary osteoarthritis of right hip [M16.11]  Essential hypertension [I10]    Post-op Diagnosis:    Post-Op Diagnosis Codes:     * Right hip pain [M25.551]     * Primary osteoarthritis of right hip [M16.11]     * Essential hypertension [I10]    Procedure:  Procedure(s):  TOTAL HIP ARTHROPLASTY ANTERIOR -right    Surgeon:  Surgeon(s):  Nathaniel Kate MD    ASSISTANT:  Lexi Leija CSA    Anesthesia: Spinal    Staff:   Circulator: Regan Woodall RN  Radiology Technologist: Roosevelt Dailey  Scrub Person: Julissa Lei  Assistant: Lexi Leija CSA    Estimated Blood Loss: 400 mL    Specimens:                ID Type Source Tests Collected by Time   A : RIGHT FEMORAL HEAD AND SOFT TISSUE Bone Hip, Right TISSUE PATHOLOGY EXAM Nathaniel Kate MD 10/14/2019 0751         Drains:   Closed/Suction Drain 1 Right;Anterior Hip 7 Fr. (Active)       [REMOVED] Urethral Catheter Latex 16 Fr. (Removed)       Complications: None    IMPLANTS:   Implant Name Type Inv. Item Serial No.  Lot No. LRB No. Used   CUP ACET PINN SECTOR W/GRIPTN M/HL 50MM - [id number] Implant CUP ACET PINN SECTOR W/GRIPTN M/HL 50MM  DEPUY [id number] Right 1    SCRW CANC PINN 6.5X30MM - [id number] Implant SCRW CANC PINN 6.5X30MM  DEPUY [id number] Right 1   SCRW CANC PINN 6.5X25MM - [id number] Implant SCRW CANC PINN 6.5X25MM  DEPUY [id number] Right 1   LINER ACET PINN ALTRX NTRL [id number] - [id number] Implant LINER ACET PINN ALTRX NTRL [id number]  DEPUY [id number] Right 1   STEM FEM CORAIL CMTLS W/COL AMT SZ11 - [id number] Implant STEM FEM CORAIL CMTLS W/COL AMT SZ11  DEPUY [id number] Right 1   HD FEM BIOLOXDELTA/ART CERAM 32MM PLS5 - [id number] Implant HD FEM BIOLOXDELTA/ART CERAM 32MM PLS5  DEPUY [id number] Right 1         PROCEDURE:  The patient was taken to the operating room and placed in the supine position. A sequential compression device was carefully placed on the non-operative leg. Preoperative antibiotics were administered. Surgical time out was performed. After adequate induction of anesthesia, the feet were padded and placed in the [place] table boots. The patient was then transferred onto the [place] table and positioned appropriately. The Right hip was then prepped and draped in the usual sterile fashion.   An incision was then made starting 2 cm lateral to the ASIS heading distal and lateral at approximately 30 degrees. The subcutaneous fat was then divided down to the fascia overlying the tensor fascia sarai (TFL) muscle. This was sharply divided staying a few cm lateral to the interval between the sartorius and the TFL muscle. This interval was then bluntly dissected. The circumflex vessels were then identified, cauterized, and divided. There was excellent hemostasis. Cobra retractors were then placed around the femoral neck capsule. The anterior capsule was then resected. The retractors were then placed intracapsular and the the femoral neck was identified. The arthritic change was noted to be moderate in the femoral head and along the rim of the acetabulum. The femoral neck cut was made and the femoral head was then engaged with the corkscrew and removed.  In attempting to  "remove the femoral head with the corkscrew, the femoral head just disintegrated and fell apart.  It came out in multiple pieces.  There is no pathologic change to the interior of the femoral head but it just had no inherent consistency.   There were significant osteophytes noted around the periphery of the head fragments.   The acetabulum was then exposed with \"number 7\" retractors. The acetabulum was then addressed with C-arm imaging and the acetabular reamers. We reamed out the medial osteophyte and then up to a solid 'rim' fit.  A 48 mm reamer had a super \"bite\" and advanced slightly more than intended.  The reamings from the previous reamers were then utilized to fill this void after a 49 and 50 mm reamer were used to gain a good rim fit.  A trial cup was then impacted into position with good fixation. The trial was removed and the hip copiously irrigated. The final acetabular implant was then placed and impacted into position with C-arm guidance.  It was felt to have an excellent rim fit.  Two acetabular screws were then placed with excellent purchase to supplement cup stability. The final liner was then placed and then the wound was irrigated once again.    Attention was turned to the femur. The femoral elevator hook was placed. The leg was then moved to the external rotation, extension, and adduction position. Retractors were placed around the proximal femur and then the posterior capsule and conjoined tendon was the Released. The box osteotome was then used to creat the starting hole. The femur was then prepared using the chili-pepper broach and then we progressively broached up the final broach which fit nicely with excellent rotational and axial stability. The trial neck and head were then placed and the hip was then reduced and c-arm images were taken which confirmed appropriate fit and position of the implants. There were no complicating factors noted, and flouro images were taken which confirmed proper " restoration of leg length and offset. The trial components were removed, the hip was copiously irrigated and the final implants were then seated. The hip was then reduced and found to be stable.  However, there was some laxity once the final implant was placed.  It was felt that the 1 mm femoral head allowed for too much laxity and did not reestablish the muscle tension.  C-arm imaging showed that it appeared that the neck was slightly short.  Therefore it was felt necessary to remove the 1 mm head and replace it with a 5 mm head.  The hip was felt to be much more stable and this situation and the muscle tension was much improved.  C-arm images again confirmed appropriate anatomy restoration without complicating factors noted.    The hip was then copiously irrigated.  There was excellent hemostasis. A small hemovac drain was then placed within the joint and then the wound was irrigated once again. . We closed the hip in multiple layers in standard fashion. Sterile dressing were applied. At the end of the case, the sponge and needle counts were reported as being correct. There were no known complications. The patient was then transported to the recovery room.          10/14/19 at 9:52 AM by Nathaniel Kate MD            Electronically signed by Nathaniel Kate MD at 10/14/19 1533     Nathaniel Kate MD at 10/14/19 0645  Version 1 of 2         TOTAL HIP ARTHROPLASTY ANTERIOR  Procedure Note    Name:    Sam Infante  YOB: 1955  Date of surgery:   10/14/2019    Pre-op Diagnosis:   Right hip pain [M25.551]  Primary osteoarthritis of right hip [M16.11]  Essential hypertension [I10]    Post-op Diagnosis:    Post-Op Diagnosis Codes:     * Right hip pain [M25.551]     * Primary osteoarthritis of right hip [M16.11]     * Essential hypertension [I10]    Procedure:  Procedure(s):  TOTAL HIP ARTHROPLASTY ANTERIOR    Surgeon:  Surgeon(s):  Nathaniel Kate MD    ASSISTANT:   Lexi Leija CSA    Anesthesia: Spinal    Staff:   Circulator: Regan Woodall RN  Radiology Technologist: Roosevelt Dailey  Scrub Person: Julissa Lei  Assistant: Lexi Leija CSA    Estimated Blood Loss: 400 mL    Specimens:                ID Type Source Tests Collected by Time   A : RIGHT FEMORAL HEAD AND SOFT TISSUE Bone Hip, Right TISSUE PATHOLOGY EXAM Nathaniel Kate MD 10/14/2019 0751         Drains:   Closed/Suction Drain 1 Right;Anterior Hip 7 Fr. (Active)       [REMOVED] Urethral Catheter Latex 16 Fr. (Removed)       Complications: None    IMPLANTS:   Implant Name Type Inv. Item Serial No.  Lot No. LRB No. Used   CUP ACET PINN SECTOR W/GRIPTN M/HL 50MM - [id number] Implant CUP ACET PINN SECTOR W/GRIPTN M/HL 50MM  DEPUY [id number] Right 1   SCRW CANC PINN 6.5X30MM - [id number] Implant SCRW CANC PINN 6.5X30MM  DEPUY [id number] Right 1   SCRW CANC PINN 6.5X25MM - [id number] Implant SCRW CANC PINN 6.5X25MM  DEPUY [id number] Right 1   LINER ACET PINN ALTRX NTRL 99X59RY - [id number] Implant LINER ACET PINN ALTRX NTRL [id number]  DEPUY [id number] Right 1   STEM FEM CORAIL CMTLS W/COL AMT SZ11 - [id number] Implant STEM FEM CORAIL CMTLS W/COL AMT SZ11  DEPUY [id number] Right 1   HD FEM BIOLOXDELTA/ART CERAM 32MM PLS5 - [id number] Implant HD FEM BIOLOXDELTA/ART CERAM 32MM PLS5  DEPUY [id number] Right 1         PROCEDURE:  The patient was taken to the operating room and placed in the supine position. A sequential compression device was carefully placed on the non-operative leg. Preoperative antibiotics were administered. Surgical time out was performed. After adequate induction of anesthesia, the feet were padded and placed in the [place] table boots. The patient was then transferred onto the [place] table and positioned appropriately. The Right hip was then prepped and draped in the usual sterile fashion.   An incision was then made starting 2 cm lateral to the ASIS heading distal and lateral at approximately  "30 degrees. The subcutaneous fat was then divided down to the fascia overlying the tensor fascia sarai (TFL) muscle. This was sharply divided staying a few cm lateral to the interval between the sartorius and the TFL muscle. This interval was then bluntly dissected. The circumflex vessels were then identified, cauterized, and divided. There was excellent hemostasis. Cobra retractors were then placed around the femoral neck capsule. The anterior capsule was then resected. The retractors were then placed intracapsular and the the femoral neck was identified. The arthritic change was noted to be moderate in the femoral head and along the rim of the acetabulum. The femoral neck cut was made and the femoral head was then engaged with the corkscrew and removed without difficulty. The head was found to be misshapen and devoid of cartilage over most of the joint surface. There were significant osteophytes noted around the periphery of the head.   The acetabulum was then exposed with \"number 7\" retractors. The acetabulum was then addressed with C-arm imaging and the acetabular reamers. We reamed out the medial osteophyte and then up to a solid 'rim' fit.  A 48 mm reamer had a super \"bite\" and advanced slightly more than intended.  The reamings from the previous reamers were then utilized to fill this void after a 49 and 50 mm reamer were used to gain a good rim fit.  A trial cup was then impacted into position with good fixation. The trial was removed and the hip copiously irrigated. The final acetabular implant was then placed and impacted into position with C-arm guidance.  It was felt to have an excellent rim fit.  Two acetabular screws were then placed with excellent purchase to supplement cup stability. The final liner was then placed and then the wound was irrigated once again.    Attention was turned to the femur. The femoral elevator hook was placed. The leg was then moved to the external rotation, extension, and " adduction position. Retractors were placed around the proximal femur and then the posterior capsule and conjoined tendon was the Released. The box osteotome was then used to creat the starting hole. The femur was then prepared using the chili-pepper broach and then we progressively broached up the final broach which fit nicely with excellent rotational and axial stability. The trial neck and head were then placed and the hip was then reduced and c-arm images were taken which confirmed appropriate fit and position of the implants. There were no complicating factors noted, and flouro images were taken which confirmed proper restoration of leg length and offset. The trial components were removed, the hip was copiously irrigated and the final implants were then seated. The hip was then reduced and found to be stable.  However, there was some laxity once the final implant was placed.  It was felt that the 1 mm femoral head allowed for too much laxity and did not reestablish the muscle tension.  C-arm imaging showed that it appeared that the neck was slightly short.  Therefore it was felt necessary to remove the 1 mm head and replace it with a 5 mm head.  The hip was felt to be much more stable and this situation and the muscle tension was much improved.  C-arm images again confirmed appropriate anatomy restoration without complicating factors noted.    The hip was then copiously irrigated.  There was excellent hemostasis. A small hemovac drain was then placed within the joint and then the wound was irrigated once again. . We closed the hip in multiple layers in standard fashion. Sterile dressing were applied. At the end of the case, the sponge and needle counts were reported as being correct. There were no known complications. The patient was then transported to the recovery room.          10/14/19 at 9:52 AM by Nathaniel Kate MD            Electronically signed by Nathaniel Kate MD at 10/14/19 4207

## 2019-10-15 NOTE — PLAN OF CARE
Problem: Patient Care Overview  Goal: Plan of Care Review  Outcome: Ongoing (interventions implemented as appropriate)   10/15/19 9145   Coping/Psychosocial   Plan of Care Reviewed With patient   OTHER   Outcome Summary pt responded well to therapy w/ increased gait to 150 ft x2 mod indep w/ RW. pt is indep w/ bed mob and stair training. pt edu on HEP and home safety. pt participated in LE ther ex. 3 new goals met. pt would continue to benefit from PT services.    Plan of Care Review   Progress improving

## 2019-10-15 NOTE — PROGRESS NOTES
Anticoagulation- Warfarin     Completed Discharge Warfarin Counseling    Discussed the followin. Reason for Medication and Length of therapy  2. Drug-Drug Interactions  3. Drug-Diet Interactions  4. Drug- Alcohol Interactions  5. Signs and Symptoms of Bleeding  6. Fall risk- go to the Emergency Room  7. Home procedures:  Patient is going home with outpatient therapy  8. Sent the Rx to: [facility] for warfarin 5 mg, Number: 30  Si tablet every night or As Directed + 1 refills  9. Gave Warfarin booklet  10. Answered all Questions  11. Called in lab orders to [facility]    Adwoa Littlejohn [facility]  10/15/19  10:37 AM

## 2019-10-15 NOTE — PLAN OF CARE
Problem: Patient Care Overview  Goal: Plan of Care Review  Outcome: Ongoing (interventions implemented as appropriate)   10/15/19 0257   Coping/Psychosocial   Plan of Care Reviewed With patient   OTHER   Outcome Summary VSS, complains of little pain, ambulating well, elevated BP, continue to monitor.    Plan of Care Review   Progress improving     Goal: Individualization and Mutuality  Outcome: Ongoing (interventions implemented as appropriate)    Goal: Discharge Needs Assessment  Outcome: Ongoing (interventions implemented as appropriate)    Goal: Interprofessional Rounds/Family Conf  Outcome: Ongoing (interventions implemented as appropriate)      Problem: Hip Arthroplasty (Total, Partial) (Adult)  Goal: Signs and Symptoms of Listed Potential Problems Will be Absent, Minimized or Managed (Hip Arthroplasty)  Outcome: Ongoing (interventions implemented as appropriate)    Goal: Anesthesia/Sedation Recovery  Outcome: Outcome(s) achieved Date Met: 10/15/19      Problem: Pain, Chronic (Adult)  Goal: Identify Related Risk Factors and Signs and Symptoms  Outcome: Outcome(s) achieved Date Met: 10/15/19    Goal: Acceptable Pain/Comfort Level and Functional Ability  Outcome: Ongoing (interventions implemented as appropriate)      Problem: Fall Risk (Adult)  Goal: Identify Related Risk Factors and Signs and Symptoms  Outcome: Outcome(s) achieved Date Met: 10/15/19    Goal: Absence of Fall  Outcome: Ongoing (interventions implemented as appropriate)

## 2019-10-15 NOTE — PROGRESS NOTES
Anticoagulation- Warfarin     Completed Discharge Warfarin Counseling    Discussed the followin. Reason for Medication and Length of therapy  2. Drug-Drug Interactions  3. Drug-Diet Interactions  4. Drug- Alcohol Interactions  5. Signs and Symptoms of Bleeding  6. Fall risk- go to the Emergency Room  7. Home procedures:  Patient is going home with outpatient therapy  8. Sent the Rx to: [facility] for warfarin 5 mg, Number: 30  Si tablet every night or As Directed + 1 refills  9. Gave Warfarin booklet  10. Answered all Questions  11. Called in lab orders to [facility]    Adwoa Littlejohn [facility]  10/15/19  10:37 AM

## 2019-10-15 NOTE — THERAPY TREATMENT NOTE
Acute Care - Physical Therapy Treatment Note  [facility]     Patient Name: Sam Infante  : 1955  MRN: [id number]  Today's Date: 10/15/2019  Onset of Illness/Injury or Date of Surgery: 10/14/19  Date of Referral to PT: 10/14/19  Referring Physician: Dr. Kate    Admit Date: 10/14/2019    Visit Dx:    ICD-10-CM ICD-9-CM   1. Primary osteoarthritis of right hip M16.11 715.15   2. Right hip pain M25.551 719.45   3. Essential hypertension I10 401.9   4. Impaired physical mobility Z74.09 781.99   5. Impaired mobility and activities of daily living Z74.09 799.89   6. Long term (current) use of anticoagulants Z79.01 V58.61     Patient Active Problem List   Diagnosis   • Borderline glaucoma, open angle with borderline findings   • Borderline glaucoma   • Left hand pain   • Bilateral carpal tunnel syndrome   • Right hip pain   • Primary osteoarthritis of right hip   • Essential hypertension       Therapy Treatment    Rehabilitation Treatment Summary     Row Name 10/15/19 1043 10/15/19 0924          Treatment Time/Intention    Discipline  occupational therapy assistant  -CS  physical therapy assistant  -KRISTAN     Document Type  therapy note (daily note)  -CS  therapy note (daily note)  -KRISTAN     Subjective Information  no complaints  -CS  --     Mode of Treatment  occupational therapy  -CS  co-treatment;physical therapy;occupational therapy  -JC2     Therapy Frequency (PT Clinical Impression)  --  -- 6x week  -JC2     Therapy Frequency (OT Eval)  daily  -CS  --     Patient Effort  good  -CS  good  -JC2     Comment  --  nsg states to continue in regards to pts elevated BP. MD is aware. pt is to follow up with cardiologist after DC.   -KRISTAN     Existing Precautions/Restrictions  fall;hip, anterior  -CS  fall;hip, anterior  -JC2     Recorded by [CS] Genna Rosado, ANTOINE/L 10/15/19 1314 [KRISTAN] Bernard Cortez PTA 10/15/19 0935  [JC2] Bernard Cortez PTA 10/15/19 0932     Row Name 10/15/19 1043 10/15/19 0949           Vital Signs    Pre Systolic BP Rehab  --  176 taken manually in L arm, 180/98 manually R arm.   -KRISTAN     Pre Treatment Diastolic BP  --  104  -KRISTAN     Post Systolic BP Rehab  --  170  -JC2     Post Treatment Diastolic BP  --  92  -JC2     Pretreatment Heart Rate (beats/min)  91  -CS  98  -KRISTAN     Posttreatment Heart Rate (beats/min)  --  93  -JC2     Pre SpO2 (%)  97  -CS  98  -KRISTAN     O2 Delivery Pre Treatment  room air  -CS  room air  -KRISATN     Post SpO2 (%)  --  97  -JC2     O2 Delivery Post Treatment  --  room air  -JC2     Pre Patient Position  Supine  -CS  Supine  -JC3     Post Patient Position  Supine  -CS  Supine  -JC3     Recorded by [CS] Genna Rosado ANTOINE/L 10/15/19 1314 [KRISTAN] Bernard Cortez, PTA 10/15/19 0935  [JC2] Bernard Cortez, PTA 10/15/19 1009  [JC3] Bernard Cortez, PTA 10/15/19 1351     Row Name 10/15/19 1043 10/15/19 0924          Cognitive Assessment/Intervention- PT/OT    Affect/Mental Status (Cognitive)  WFL  -  WFL  -KRISTAN     Orientation Status (Cognition)  oriented x 4  -CS  oriented x 4  -KRISTAN     Follows Commands (Cognition)  [facility]  -  WFL  -KRISTAN     Cognitive Function (Cognitive)  [facility]  -  WFL  -KRISTAN     Personal Safety Interventions  --  fall prevention program maintained;gait belt;muscle strengthening facilitated;nonskid shoes/slippers when out of bed;supervised activity  -JC2     Recorded by [CS] Genna Rosado ANTOINE/L 10/15/19 1314 [KRISTAN] Bernard Cortez, PTA 10/15/19 0932  [JC2] Bernard Cortez, PTA 10/15/19 1351     Row Name 10/15/19 0924             Safety Issues, Functional Mobility    Impairments Affecting Function (Mobility)  pain;strength  -KRISTAN      Recorded by [KRISTAN] Bernard Cortez, PTA 10/15/19 0932      Row Name 10/15/19 0924             Bed Mobility Assessment/Treatment    Bed Mobility Assessment/Treatment  scooting/bridging;supine-sit;sit-supine  -KRISTAN      Scooting/Bridging [place] (Bed Mobility)  conditional independence  -KRISTAN      Supine-Sit [place] (Bed  Mobility)  independent  -KRISTAN      Sit-Supine [place] (Bed Mobility)  independent  -KRISTAN      Bed Mobility, Safety Issues  decreased use of legs for bridging/pushing  -JC2      Assistive Device (Bed Mobility)  --  -KRISTAN      Comment (Bed Mobility)  HOB flat. no bed rails.   -KRISTAN      Recorded by [KRISTAN] Bernard Cortez, PTA 10/15/19 1009  [JC2] Bernard Cortez, PTA 10/15/19 0932      Row Name 10/15/19 0924             Transfer Assessment/Treatment    Transfer Assessment/Treatment  sit-stand transfer;stand-sit transfer;bed-chair transfer;chair-bed transfer  -KRISTAN      Recorded by [KRISTAN] Bernard Cortez, PTA 10/15/19 1009      Row Name 10/15/19 0924             Bed-Chair Transfer    Bed-Chair [place] (Transfers)  conditional independence  -KRISTAN      Assistive Device (Bed-Chair Transfers)  walker, front-wheeled  -KRISTAN      Recorded by [KRISTAN] Bernard Cortez, PTA 10/15/19 1009      Row Name 10/15/19 0924             Chair-Bed Transfer    Chair-Bed [place] (Transfers)  conditional independence  -KRISTAN      Assistive Device (Chair-Bed Transfers)  walker, front-wheeled  -JC2      Recorded by [KRISTAN] Bernard Cortez, PTA 10/15/19 1351  [JC2] Bernard Cortez, PTA 10/15/19 1009      Row Name 10/15/19 0924             Sit-Stand Transfer    Sit-Stand [place] (Transfers)  conditional independence  -KRISTAN      Assistive Device (Sit-Stand Transfers)  walker, front-wheeled  -JC2      Recorded by [KRISTAN] Bernard Cortez, PTA 10/15/19 1351  [JC2] Bernard Cortez, PTA 10/15/19 0932      Row Name 10/15/19 0924             Stand-Sit Transfer    Stand-Sit [place] (Transfers)  conditional independence  -KRISTAN      Assistive Device (Stand-Sit Transfers)  walker, front-wheeled  -JC2      Recorded by [KRISTAN] Bernard Cortez, PTA 10/15/19 1351  [JC2] Bernard Cortez, PTA 10/15/19 0932      Row Name 10/15/19 0924             Toilet Transfer    Type (Toilet Transfer)  --  -KRISTAN      [place] Level (Toilet Transfer)  --  -KRISTAN      Assistive Device  (Toilet Transfer)  --  -KRISTAN      Recorded by [KRISTAN] Bernard Cortez, PTA 10/15/19 1351      Row Name 10/15/19 0924             Gait/Stairs Assessment/Training    Gait/Stairs Assessment/Training  gait/ambulation independence;gait/ambulation assistive device;distance ambulated  -KRISTAN      [place] Level (Gait)  conditional independence  -JC2      Assistive Device (Gait)  walker, front-wheeled  -KRISTAN      Distance in Feet (Gait)  150 ft x2  -JC2      Deviations/Abnormal Patterns (Gait)  antalgic  -JC2      Negotiation (Stairs)  stairs independence;stairs assistive device;handrail location;number of steps;ascending technique;descending technique  -JC2      [place] Level (Stairs)  conditional independence  -JC2      Handrail Location (Stairs)  left side (ascending)  -JC2      Number of Steps (Stairs)  5  -JC2      Ascending Technique (Stairs)  step-to-step  -JC2      Descending Technique (Stairs)  step-to-step  -JC2      Recorded by [KRISTAN] Bernard Cortez, PTA 10/15/19 0932  [JC2] Bernard Cortez, PTA 10/15/19 1351      Row Name 10/15/19 1043             ADL Assessment/Intervention    BADL Assessment/Intervention  bathing;upper body dressing;lower body dressing  -CS      Recorded by [CS] Genna Rosado ANTOINE/L 10/15/19 1314      Row Name 10/15/19 1043             Bathing Assessment/Intervention    Comment (Bathing)  pt deferred  -CS      Recorded by [CS] Genna Rosado COTA/L 10/15/19 1314      Row Name 10/15/19 1043             Motor Skills Assessment/Interventions    Additional Documentation  Therapeutic Exercise (Group)  -CS      Recorded by [CS] Genna Rosado ANTOINE/L 10/15/19 1314      Row Name 10/15/19 0924             Therapeutic Exercise    Therapeutic Exercise  supine, lower extremities  -KRISTAN      Recorded by [KRISTAN] Bernard Cortez, PTA 10/15/19 1351      Row Name 10/15/19 0924             Lower Extremity Supine Therapeutic Exercise    Performed, Supine Lower Extremity (Therapeutic Exercise)  ankle  dorsiflexion/plantarflexion;quadriceps sets;gluteal sets;hip abduction/adduction;heel slides  -KRISTAN      Exercise Type, Supine Lower Extremity (Therapeutic Exercise)  AROM (active range of motion)  -KRISTAN      Sets/Reps Detail, Supine Lower Extremity (Therapeutic Exercise)  10x1 nita  -KRISTAN      Comment, Supine Lower Extremity (Therapeutic Exercise)  edu on HEP and home safety.   -KRISTAN      Recorded by [KRISTAN] Bernard Cortez, PTA 10/15/19 1351      Row Name 10/15/19 1049             Therapeutic Exercise    Upper Extremity (Therapeutic Exercise)  bicep curl, bilateral  -CS      Upper Extremity Range of Motion (Therapeutic Exercise)  shoulder flexion/extension, bilateral;shoulder abduction/adduction, bilateral;shoulder horizontal abduction/adduction, bilateral;elbow flexion/extension, bilateral  -CS      Weight/Resistance (Therapeutic Exercise)  green  -CS      Exercise Type (Therapeutic Exercise)  AROM (active range of motion)  -CS      Position (Therapeutic Exercise)  seated  -CS      Sets/Reps (Therapeutic Exercise)  1/20  -CS      Equipment (Therapeutic Exercise)  resistive bands  -CS      Expected Outcome (Therapeutic Exercise)  improve functional tolerance, self-care activity;improve performance, transfer skills;improve performance, BADLs  -CS      Recorded by [CS] Genna Rosado COTA/L 10/15/19 1314      Row Name 10/15/19 1043 10/15/19 0924          Positioning and Restraints    Pre-Treatment Position  in bed  -CS  in bed  -KRISTAN     Post Treatment Position  bed  -CS  bed  -KRISTAN     In Bed  fowlers;call light within reach;encouraged to call for assist;exit alarm on  -CS  fowlers;call light within reach;encouraged to call for assist;exit alarm on all needs met  -KRISTAN     Recorded by [CS] Genna Rosado COTA/NARAYAN 10/15/19 1314 [KRISTAN] Bernard Cortez, PTA 10/15/19 4385     Row Name 10/15/19 0947             Pain Assessment    Additional Documentation  Pain Scale: Numbers Pre/Post-Treatment (Group)  -KRISTAN      Recorded by [KRISTAN]  Bernard Cortez, PTA 10/15/19 1351      Row Name 10/15/19 1043 10/15/19 0924          Pain Scale: Numbers Pre/Post-Treatment    Pain Scale: Numbers, Pretreatment  2/10  -CS  3/10  -KRISTAN     Pain Scale: Numbers, Post-Treatment  2/10  -CS  4/10  -KRISTAN     Pain Location - Side  Right  -CS  Right  -JC2     Pain Location  hip  -CS  hip  -JC2     Pain Intervention(s)  --  Repositioned  -KRISTAN     Recorded by [CS] Genna Rosado, ANTOINE/L 10/15/19 1314 [KRISTAN] Bernard Cortez, PTA 10/15/19 1009  [JC2] Bernard Cortez, PTA 10/15/19 0932     Row Name 10/15/19 0924             Sensory Assessment/Intervention    Sensory General Assessment  --  -KRISTAN      Recorded by [KRISTAN] Bernard Cortez, PTA 10/15/19 1351      Row Name 10/15/19 0924             Hearing Assessment    Hearing Status  --  -KRISTAN      Recorded by [KRISTAN] Bernard Cortez, PTA 10/15/19 1351      Row Name 10/15/19 0924             Vision Assessment/Intervention    Visual Impairment/Limitations  --  -KRISTAN      Recorded by [KRISTAN] Bernard Cortez, PTA 10/15/19 1351      Row Name                Wound 10/14/19 0837 Right anterior hip Incision    Wound - Properties Group Date first assessed: 10/14/19 [TB] Time first assessed: 0837 [TB] Present on Hospital Admission: N [TB] Side: Right [TB] Orientation: anterior [TB] Location: hip [TB] Primary Wound Type: Incision [TB] Recorded by:  [TB] Regan Woodall RN 10/14/19 0838    Row Name 10/15/19 0924             Coping    Observed Emotional State  calm;cooperative  -KRISTAN      Recorded by [KRISTAN] Bernard Cortez, PTA 10/15/19 0932      Row Name 10/15/19 1043             Outcome Summary/Treatment Plan (OT)    Daily Summary of Progress (OT)  progress toward functional goals is good  -CS      Plan for Continued Treatment (OT)  cont OT POC  -CS      Anticipated Discharge Disposition (OT)  home with assist;home with OP services  -CS      Recorded by [CS] Genna Rosado ANTOINE/L 10/15/19 1314      Row Name 10/15/19 0924             Outcome  Summary/Treatment Plan (PT)    Daily Summary of Progress (PT)  progress toward functional goals as expected  -KRISTAN      Anticipated Equipment Needs at Discharge (PT)  bedside commode  -JC2      Anticipated Discharge Disposition (PT)  home with OP services  -JC2      Referral Needed to Another Service (PT)  --  -KRISTAN      Recorded by [KRISTAN] Diego Bernard R, PTA 10/15/19 1351  [JC2] Bernard Cortez, PTA 10/15/19 0932        User Key  (r) = Recorded By, (t) = Taken By, (c) = Cosigned By    Initials Name Effective Dates Discipline    KRISTAN Bernard Cortez, PTA 03/07/18 -  PT    CS Genna Rosado, ANTOINE/L 03/07/18 -  OT    TB Regan Woodall, RN 10/17/16 -  Nurse          Wound 10/14/19 0837 Right anterior hip Incision (Active)   Dressing Appearance dry;intact 10/15/2019  8:26 AM   Closure GASTON 10/14/2019  8:11 PM   Base dry;clean 10/15/2019  8:26 AM   Drainage Amount none 10/15/2019  8:26 AM       Rehab Goal Summary     Row Name 10/15/19 1043 10/15/19 0924          Physical Therapy Goals    Bed Mobility Goal Selection (PT)  --  bed mobility, PT goal 1  -KRISTAN     Transfer Goal Selection (PT)  --  transfer, PT goal 1  -     Gait Training Goal Selection (PT)  --  gait training, PT goal 1  -     Stairs Goal Selection (PT)  --  stairs, PT goal 1;stairs, PT goal 2  -KRISTAN        Bed Mobility Goal 1 (PT)    Activity/Assistive Device (Bed Mobility Goal 1, PT)  --  sit to supine;supine to sit HOB down and no rails  -KRISTAN     [place] Level/Cues Needed (Bed Mobility Goal 1, PT)  --  independent  -KRISTAN     Time Frame (Bed Mobility Goal 1, PT)  --  3 days  -KRISTAN     Progress/Outcomes (Bed Mobility Goal 1, PT)  --  goal met  (Significant)   -        Transfer Goal 1 (PT)    Activity/Assistive Device (Transfer Goal 1, PT)  --  sit-to-stand/stand-to-sit;bed-to-chair/chair-to-bed;walker, rolling  -KRISTAN     [place] Level/Cues Needed (Transfer Goal 1, PT)  --  supervision required  -KRISTAN     Time Frame (Transfer Goal 1, PT)  --  2 days  -KRISTAN      Progress/Outcome (Transfer Goal 1, PT)  --  goal met  (Significant)   -        Gait Training Goal 1 (PT)    Activity/Assistive Device (Gait Training Goal 1, PT)  --  gait (walking locomotion);assistive device use;decrease fall risk;increase endurance/gait distance;turning, left;turning, right;walker, rolling  -KRISTAN     [place] Level (Gait Training Goal 1, PT)  --  supervision required;standby assist  -KRISTAN     Distance (Gait Goal 1, PT)  --  300 feet  -KRISTAN     Time Frame (Gait Training Goal 1, PT)  --  2 - 3 days  -KRISTAN     Progress/Outcome (Gait Training Goal 1, PT)  --  goal not met;goal ongoing  -        Stairs Goal 1 (PT)    Activity/Assistive Device (Stairs Goal 1, PT)  --  ascending stairs;descending stairs  -KRISTAN     [place] Level/Cues Needed (Stairs Goal 1, PT)  --  contact guard assist  -KRISTAN     Number of Stairs (Stairs Goal 1, PT)  --  1  -KRISTAN     Time Frame (Stairs Goal 1, PT)  --  1 day  -KRISTAN     Progress/Outcome (Stairs Goal 1, PT)  --  goal met  (Significant)   -KRISTAN        Stairs Goal 2 (PT)    Activity/Assistive Device (Stairs Goal 2, PT)  --  ascending stairs;descending stairs;using handrail, right  -KRISTAN     [place] Level/Cues Needed (Stairs Goal 2, PT)  --  contact guard assist  -KRISTAN     Number of Stairs (Stairs Goal 2, PT)  --  12  -KRISTAN     Time Frame (Stairs Goal 2, PT)  --  4 days  -KRISTAN     Progress/Outcome (Stairs Goal 2, PT)  --  goal not met;goal ongoing  -        Occupational Therapy Goals    Bed Mobility Goal Selection (OT)  bed mobility, OT goal 1  -CS  --     Transfer Goal Selection (OT)  transfer, OT goal 1  -CS  --     Bathing Goal Selection (OT)  bathing, OT goal 1  -CS  --     Dressing Goal Selection (OT)  dressing, OT goal 1  -CS  --        Bed Mobility Goal 1 (OT)    Activity/Assistive Device (Bed Mobility Goal 1, OT)  bed mobility activities, all  -CS  --     [place] Level/Cues Needed (Bed Mobility Goal 1, OT)  conditional independence  -CS  --     Time Frame (Bed  Mobility Goal 1, OT)  long term goal (LTG)  -CS  --     Progress/Outcomes (Bed Mobility Goal 1, OT)  goal not met  -CS  --        Transfer Goal 1 (OT)    Activity/Assistive Device (Transfer Goal 1, OT)  transfers, all  -CS  --     [place] Level/Cues Needed (Transfer Goal 1, OT)  supervision required  -CS  --     Time Frame (Transfer Goal 1, OT)  long term goal (LTG)  -CS  --     Progress/Outcome (Transfer Goal 1, OT)  goal not met  -CS  --        Bathing Goal 1 (OT)    Activity/Assistive Device (Bathing Goal 1, OT)  bathing skills, all  -CS  --     [place] Level/Cues Needed (Bathing Goal 1, OT)  contact guard assist  -CS  --     Time Frame (Bathing Goal 1, OT)  long term goal (LTG)  -CS  --     Progress/Outcomes (Bathing Goal 1, OT)  goal not met  -CS  --        Dressing Goal 1 (OT)    Activity/Assistive Device (Dressing Goal 1, OT)  dressing skills, all  -CS  --     [place]/Cues Needed (Dressing Goal 1, OT)  supervision required  -CS  --     Time Frame (Dressing Goal 1, OT)  long term goal (LTG)  -CS  --     Progress/Outcome (Dressing Goal 1, OT)  goal not met  -CS  --        Patient Education Goal (OT)    Activity (Patient Education Goal, OT)  Home safety/fall prev and use of ADL adaptive devices.  -CS  --     [place]/Cues/Accuracy (Memory Goal 2, OT)  demonstrates adequately;verbalizes understanding  -CS  --     Time Frame (Patient Education Goal, OT)  long term goal (LTG)  -CS  --     Progress/Outcome (Patient Education Goal, OT)  goal not met  -CS  --       User Key  (r) = Recorded By, (t) = Taken By, (c) = Cosigned By    Initials Name Provider Type Discipline    Bernard Alejandre, PTA Physical Therapy Assistant PT    CS Genna Rosado COTA/NARAYAN Occupational Therapy Assistant OT          Physical Therapy Education     Title: PT OT SLP Therapies (Done)     Topic: Physical Therapy (Done)     Point: Mobility training (Done)     Learning Progress Summary           Patient Acceptance, E,H,  DU,VU by  at 10/15/2019  1:52 PM    Acceptance, E,D, NR by  at 10/14/2019  3:18 PM    Comment:  educated on FWB RLE- no precautions, answered all questions about driving                   Point: Home exercise program (Done)     Learning Progress Summary           Patient Acceptance, E,H, DU,VU by  at 10/15/2019  1:52 PM                   Point: Precautions (Done)     Learning Progress Summary           Patient Acceptance, E,H, DU,VU by  at 10/15/2019  1:52 PM    Acceptance, E,D, NR by  at 10/14/2019  3:18 PM    Comment:  educated on FWB RLE- no precautions, answered all questions about driving                               User Key     Initials Effective Dates Name Provider Type Discipline     07/24/19 -  Elena Dominguez, PT Physical Therapist PT     03/07/18 -  Bernard Cortez, PTA Physical Therapy Assistant PT                PT Recommendation and Plan  Anticipated Discharge Disposition (PT): home with OP services  Therapy Frequency (PT Clinical Impression): (6x week)  Outcome Summary/Treatment Plan (PT)  Daily Summary of Progress (PT): progress toward functional goals as expected  Anticipated Equipment Needs at Discharge (PT): bedside commode  Anticipated Discharge Disposition (PT): home with OP services  Plan of Care Reviewed With: patient  Progress: improving  Outcome Summary: pt responded well to therapy w/ increased gait to 150 ft x2 mod indep w/ RW. pt is indep w/ bed mob and stair training. pt edu on HEP and home safety. pt participated in LE ther ex. no new goals met at this time. pt would continue to benefit from PT services.   Outcome Measures     Row Name 10/15/19 1300 10/15/19 0924 10/14/19 1407       How much help from another person do you currently need...    Turning from your back to your side while in flat bed without using bedrails?  --  4  -KRISTAN  3  -CB    Moving from lying on back to sitting on the side of a flat bed without bedrails?  --  4  -KRISTAN  3  -CB    Moving to and from a bed  to a chair (including a wheelchair)?  --  4  -KRISTAN  3  -CB    Standing up from a chair using your arms (e.g., wheelchair, bedside chair)?  --  4  -KRISTAN  3  -CB    Climbing 3-5 steps with a railing?  --  4  -KRISTAN  3  -CB    To walk in hospital room?  --  4  -KRISTAN  3  -CB    AM-PAC 6 Clicks Score (PT)  --  24  -KRISTAN  18  -CB       How much help from another is currently needed...    Putting on and taking off regular lower body clothing?  3  -CS  --  --    Bathing (including washing, rinsing, and drying)  2  -CS  --  --    Toileting (which includes using toilet bed pan or urinal)  3  -CS  --  --    Putting on and taking off regular upper body clothing  4  -CS  --  --    Taking care of personal grooming (such as brushing teeth)  4  -CS  --  --    Eating meals  4  -CS  --  --    AM-PAC 6 Clicks Score (OT)  20  -CS  --  --       Functional Assessment    Outcome Measure Options  --  AM-PAC 6 Clicks Basic Mobility (PT)  -KRISTAN  AM-PAC 6 Clicks Basic Mobility (PT)  -CB    Row Name 10/14/19 9706             How much help from another is currently needed...    Putting on and taking off regular lower body clothing?  3  -RB      Bathing (including washing, rinsing, and drying)  2  -RB      Toileting (which includes using toilet bed pan or urinal)  3  -RB      Putting on and taking off regular upper body clothing  4  -RB      Taking care of personal grooming (such as brushing teeth)  4  -RB      Eating meals  4  -RB      AM-PAC 6 Clicks Score (OT)  20  -RB         Functional Assessment    Outcome Measure Options  AM-PAC 6 Clicks Daily Activity (OT)  -RB        User Key  (r) = Recorded By, (t) = Taken By, (c) = Cosigned By    Initials Name Provider Type    CB Elena Dominguez, PT Physical Therapist    RB Manny Felix, OT Occupational Therapist    KRISTAN Bernard Cortez, PTA Physical Therapy Assistant    CS Genna Rosado, ELINA/NARAYAN Occupational Therapy Assistant         Time Calculation:   PT Charges     Row Name 10/15/19 1864             Time  Calculation    Start Time  0924  -KRISTAN      Stop Time  1010  -KRISTAN      Time Calculation (min)  46 min  -KRISTAN         Time Calculation- PT    Total Timed Code Minutes- PT  46 minute(s)  -KRISTAN        User Key  (r) = Recorded By, (t) = Taken By, (c) = Cosigned By    Initials Name Provider Type    Bernard Alejandre PTA Physical Therapy Assistant        Therapy Charges for Today     Code Description Service Date Service Provider Modifiers Qty    76354768206 HC GAIT TRAINING EA 15 MIN 10/15/2019 Bernard Cortez, JANESSA GP 1    48795802400 HC PT THER PROC EA 15 MIN 10/15/2019 Bernard Cortez PTA GP 1    08033282580 HC PT THERAPEUTIC ACT EA 15 MIN 10/15/2019 Bernard Cortez PTA GP 1          PT G-Codes  Outcome Measure Options: AM-PAC 6 Clicks Basic Mobility (PT)  AM-PAC 6 Clicks Score (PT): 24  AM-PAC 6 Clicks Score (OT): 20    Bernard Cortez PTA  10/15/2019

## 2019-10-16 NOTE — DISCHARGE SUMMARY
Patient Name: Sam Infante  Patient YOB: 1955    Date of Admission:  10/14/2019  Date of Discharge:  10/15/2019  Discharge Diagnosis:   Primary osteoarthritis of right hip    Right hip pain    Essential hypertension    Impaired physical mobility    Impaired mobility and activities of daily living    Long term (current) use of anticoagulants    Presenting Problem/History of Present Illness: Right hip pain [M25.551]  Primary osteoarthritis of right hip [M16.11]  Essential hypertension [I10]  Primary osteoarthritis of right hip [M16.11]    Procedure:  Procedure(s) (LRB):  TOTAL HIP ARTHROPLASTY ANTERIOR (Right)    Admitting Physician:  Nathaniel Kate MD    Consults:   Consults     No orders found from 9/15/2019 to 10/15/2019.          DETAILS OF HOSPITAL STAY:  Patient is a 64 y.o. male was admitted to the floor following the above procedure and underwent an uncomplicated hospital stay.  Patient did well with physical therapy and was ambulating well without problems.  On the day of discharge the wound was clean, dry and intact and calf was soft and non tender and Homans sign was negative.  Patient was tolerating diet without problems.  Patient was discharged home.    Condition on Discharge:  Stable      LABS:      Admission on 10/14/2019, Discharged on 10/15/2019   Component Date Value Ref Range Status   • ABO Type 10/14/2019 O   Final   • RH type 10/14/2019 Negative   Final   • Antibody Screen 10/14/2019 Negative   Final   • T&S Expiration Date 10/14/2019 10/17/2019 11:59:59 PM   Final   • Previous History 10/14/2019 Previous Record on File   Final   • Case Report 10/14/2019    Final                    Value:Surgical Pathology Report                         Case: [id number]                                  Authorizing Provider:  Nathaniel Kate MD Collected:           10/14/2019 07:51 AM          Ordering Location:     [facility]             Received:            10/14/2019  09:56 AM                                 [place]                                                              Pathologist:           Devonte Estrada MD                                                         Specimen:    Hip, Right, RIGHT FEMORAL HEAD AND SOFT TISSUE                                            • Final Diagnosis 10/14/2019    Final                    Value:This result contains rich text formatting which cannot be displayed here.   • Gross Description 10/14/2019    Final                    Value:This result contains rich text formatting which cannot be displayed here.   • Hemoglobin 10/14/2019 13.3  13.0 - 17.7 g/dL Final   • Hematocrit 10/14/2019 38.4  37.5 - 51.0 % Final   • Protime 10/15/2019 13.1  11.1 - 15.3 Seconds Final   • INR 10/15/2019 1.01  0.80 - 1.20 Final       No results found.    Discharge Medications     Discharge Medications      New Medications      Instructions Start Date   oxyCODONE-acetaminophen 7.5-325 MG per tablet  Commonly known as:  PERCOCET   1 tablet, Oral, Every 4 Hours PRN      PHARMACY TO DOSE WARFARIN   Does not apply, Continuous PRN      warfarin 5 MG tablet  Commonly known as:  COUMADIN   Take 1 tablet by mouth at bedtime or as directed per hospital pharmacy.         Continue These Medications      Instructions Start Date   dextroamphetamine 15 MG 24 hr capsule  Commonly known as:  DEXEDRINE SPANSULE   15 mg, Oral, 2 Times Daily      latanoprost 0.005 % ophthalmic solution  Commonly known as:  XALATAN   1 drop, Both Eyes, Nightly      metoprolol succinate XL 50 MG 24 hr tablet  Commonly known as:  TOPROL-XL   50 mg, Oral, Daily      ondansetron 8 MG tablet  Commonly known as:  ZOFRAN   Oral, Every 8 Hours PRN      PRESERVISION AREDS PO   2 tablets, Oral, Daily      SUMAtriptan 20 MG/ACT nasal spray  Commonly known as:  IMITREX   Every 2 Hours PRN      verapamil  MG 24 hr capsule  Commonly known as:  VERELAN   360 mg, Oral, Daily         Stop These  Medications    naproxen 500 MG EC tablet  Commonly known as:  EC NAPROSYN            Discharge Diet:   Diet Instructions     Diet: Regular      Discharge Diet:  Regular          Activity at Discharge:   Activity Instructions     Activity as Tolerated            Discharge Instructions: Patient is to continue with physical therapy exercises daily and continue working with the physical therapist as ordered. Patient may weight bear as tolerated. Continue ice regularly and use ace bandages from the foot toward the knee as needed for swelling. Patient instructed on frequent calf pumping exercises.  Patient also instructed on incentive spirometer during hospitalization and encouraged to continue to use at home regularly. Patient may shower on POD#2. The wound should be gently cleaned with antibacterial soap then allowed to dry.  If there is any drainage then it can be covered with a dry sterile dressing.  If there is drainage, redness or swelling, then the physician should be notified. Follow up appointment in 2 weeks - patient to call the office at [phone] to schedule.  Coumadin management per hospital pharmacy.  All other meds per the discharge med/rec    Discharge Diagnosis:    Patient Active Problem List   Diagnosis   • Borderline glaucoma, open angle with borderline findings   • Borderline glaucoma   • Left hand pain   • Bilateral carpal tunnel syndrome   • Right hip pain   • Primary osteoarthritis of right hip   • Essential hypertension   • Attention deficit hyperactivity disorder   • Benign prostatic hyperplasia   • Fatigue   • Gastroesophageal reflux disease without esophagitis   • Hyperlipidemia   • Mixed anxiety and depressive disorder   • BERNADETTE (obstructive sleep apnea)   • Routine physical examination       Follow-up Appointments  Future Appointments   Date Time Provider Department Center   10/17/2019  4:00 PM Sander Gay, PT [facility] [id number] None   10/22/2019 10:45 AM Reina Jiménez MD [facility] None  "  10/28/2019  9:50 AM Brijesh Patterson, APRN [facility] None     Additional Instructions for the Follow-ups that You Need to Schedule     Ambulatory Referral to Physical Therapy Evaluate and treat   As directed      BOUCHRA  Slowly advance as tolerated  No restrictions  May need some reminders to not over do it  Swelling control    Order Comments:  BOUCHRA Slowly advance as tolerated No restrictions May need some reminders to not over do it Swelling control     Specialty needed:  Evaluate and treat         Call MD With Problems / Concerns   As directed      Instructions:   Call the office with questions, problems, or concerns.  Remember that pain medication can not be \"called in\" but a prescription must be written and picked up from the office.   So, be sure to notify the office with a little notice prior to running out.    Order Comments:  Instructions: Call the office with questions, problems, or concerns. Remember that pain medication can not be \"called in\" but a prescription must be written and picked up from the office. So, be sure to notify the office with a little notice prior to running out.          Discharge Follow-up with Specified Provider: KAILYN Salcedo R Peyton; 2 Weeks   As directed      To:  KAILYN Salcedo R Peyton    Follow Up:  2 Weeks         Protime-INR  (Twice a Week)  Oct 15, 2020      Is Patient on anti-coag:  No                  Nathaniel Kate MD  10/16/19  10:11 AM  "

## 2019-10-16 NOTE — PAYOR COMM NOTE
"Leonor Nino  [facility]  [phone])[phone]  (F)[phone]    auth#[id number]        Delfin Skinner (64 y.o. Male)     Date of Birth Social Security Number Address Home Phone MRN    1955  [address]  [facility] [address] [phone] [id number]    Latter day Marital Status          Mormon        Admission Date Admission Type Admitting Provider Attending Provider Department, Room/Bed    10/14/19 Elective Madisyn Kate MD  [facility] 3 EAST, 358/1    Discharge Date Discharge Disposition Discharge Destination        10/15/2019 Home or Self Care              Attending Provider:  (none)   Allergies:  Statins    Isolation:  None   Infection:  None   Code Status:  Prior    Ht:  167.6 cm (66\")   Wt:  86 kg (189 lb 9.5 oz)    Admission Cmt:  None   Principal Problem:  Primary osteoarthritis of right hip [M16.11]                 Active Insurance as of 10/14/2019     Primary Coverage     Payor Plan Insurance Group Employer/Plan Group    [employer]EE [id number]     Payor Plan Address Payor Plan Phone Number Payor Plan Fax Number Effective Dates    [phone]  6/1/2017 - None Entered    [place] [address]       Subscriber Name Subscriber Birth Date Member ID       DELFIN SKINNER 1955 [id number]                 Emergency Contacts      (Rel.) Home Phone Work Phone Mobile Phone    Veronica Skinner (Spouse) [phone] -- [phone]            Discharge Summary     No notes of this type exist for this encounter.        Discharge Order (From admission, onward)    Start     Ordered    10/15/19 0749  Discharge patient  Once     Expected Discharge Date:  10/15/19    Discharge Disposition:  Home or Self Care    Physician of Record for Attribution - Please select from Treatment Team:  MADISYN KATE [[id number]]    Review needed by CMO to determine Physician of Record:  No     "   Question Answer Comment   Physician of Record for Attribution - Please select from Treatment Team MADISYN BIRCH    Review needed by CMO to determine Physician of Record No        10/15/19 4292

## 2019-10-17 ENCOUNTER — LAB (OUTPATIENT)
Dept: LAB | Facility: HOSPITAL | Age: 64
End: 2019-10-17

## 2019-10-17 ENCOUNTER — ANTICOAGULATION VISIT (OUTPATIENT)
Dept: PHARMACY | Facility: HOSPITAL | Age: 64
End: 2019-10-17

## 2019-10-17 ENCOUNTER — HOSPITAL ENCOUNTER (OUTPATIENT)
Dept: PHYSICAL THERAPY | Facility: HOSPITAL | Age: 64
Setting detail: THERAPIES SERIES
Discharge: HOME | End: 2019-10-17

## 2019-10-17 DIAGNOSIS — Z79.01 LONG TERM (CURRENT) USE OF ANTICOAGULANTS: ICD-10-CM

## 2019-10-17 DIAGNOSIS — M25.551 RIGHT HIP PAIN: Primary | ICD-10-CM

## 2019-10-17 DIAGNOSIS — M16.11 PRIMARY OSTEOARTHRITIS OF RIGHT HIP: ICD-10-CM

## 2019-10-17 LAB
INR PPP: 1.31 (ref 0.8–1.2)
PROTHROMBIN TIME: 16.1 SECONDS (ref 11.1–15.3)

## 2019-10-17 PROCEDURE — 97110 THERAPEUTIC EXERCISES: CPT | Performed by: PHYSICAL THERAPIST

## 2019-10-17 PROCEDURE — 97162 PT EVAL MOD COMPLEX 30 MIN: CPT | Performed by: PHYSICAL THERAPIST

## 2019-10-17 PROCEDURE — 36415 COLL VENOUS BLD VENIPUNCTURE: CPT

## 2019-10-17 PROCEDURE — 85610 PROTHROMBIN TIME: CPT

## 2019-10-17 NOTE — THERAPY EVALUATION
Outpatient Physical Therapy Ortho Initial Evaluation  [facility]     Patient Name: Sam Infante  : 1955  MRN: [id number]  Today's Date: 10/17/2019      Visit Date: 10/17/2019  Visit   Return to MD: CONCEPCIÓN  Re-cert date: 19  Patient Active Problem List   Diagnosis   • Borderline glaucoma, open angle with borderline findings   • Borderline glaucoma   • Left hand pain   • Bilateral carpal tunnel syndrome   • Right hip pain   • Primary osteoarthritis of right hip   • Essential hypertension   • Attention deficit hyperactivity disorder   • Benign prostatic hyperplasia   • Fatigue   • Gastroesophageal reflux disease without esophagitis   • Hyperlipidemia   • Mixed anxiety and depressive disorder   • BERNADETTE (obstructive sleep apnea)   • Routine physical examination        Past Medical History:   Diagnosis Date   • Arthritis    • Elevated cholesterol    • GERD (gastroesophageal reflux disease)     OCCASIONAL   • High blood pressure    • Kidney stone    • Sleep apnea     using c-pap   • Vitreous floater 2013        Past Surgical History:   Procedure Laterality Date   • COLONOSCOPY     • RHINOPLASTY     • SEPTOPLASTY  2013    Nasal surgery procedure (Nasal septoplasty.)   • TOTAL HIP ARTHROPLASTY Right 10/14/2019    Procedure: TOTAL HIP ARTHROPLASTY ANTERIOR;  Surgeon: Nathaniel Kate MD;  Location: [facility];  Service: Orthopedics       Visit Dx:     ICD-10-CM ICD-9-CM   1. Right hip pain M25.551 719.45   2. Primary osteoarthritis of right hip M16.11 715.15     Medications (Admitted on 10/17/2019)     dextroamphetamine (DEXEDRINE SPANSULE) 15 MG 24 hr capsule     latanoprost (XALATAN) 0.005 % ophthalmic solution     metoprolol succinate XL (TOPROL-XL) 50 MG 24 hr tablet     Multiple Vitamins-Minerals (PRESERVISION AREDS PO)     ondansetron (ZOFRAN) 8 MG tablet     oxyCODONE-acetaminophen (PERCOCET) 7.5-325 MG per tablet     SUMAtriptan (IMITREX) 20 MG/ACT nasal spray     verapamil  PM (VERELAN PM) 360 MG 24 hr capsule     warfarin (COUMADIN) 5 MG tablet     Warfarin Sodium (PHARMACY TO DOSE WARFARIN      Allergies: Statins      PT Ortho     Row Name 10/17/19 1500       Subjective Comments    Subjective Comments  65 yo male s/p R BOUCHRA (anterior) on 10/14/17. One night hospital stay.  -BS       Precautions and Contraindications    Precautions/Limitations  anterior hip precautions- right  -BS       Subjective Pain    Able to rate subjective pain?  yes  -BS    Pre-Treatment Pain Level  2  -BS    Post-Treatment Pain Level  3  -BS       General ROM    GENERAL ROM COMMENTS  R LE-hip flex 55 deg abd 23 deg add 12 deg R knee 0-107 deg L LE-hip flex 96 deg knee 0-133 deg   -BS       MMT (Manual Muscle Testing)    General MMT Comments  MMT: R LE-hip flex 3-/5 knee ext 4/5 knee flex 5/5 ankle DF 5/5 LLE-hip flex 5/5 knee flex/ext 5/5 ankle DF 5/5  -BS       Sensation    Light Touch  Partial deficits in the RLE R ant/lat thigh numbness  -BS      User Key  (r) = Recorded By, (t) = Taken By, (c) = Cosigned By    Initials Name Provider Type    Sander Da Silva, PT Physical Therapist                      Therapy Education  Education Details: HEP: LAQ's, bridges, iso B hip add, H/L marching in place  Given: HEP  Program: New  How Provided: Verbal, Demonstration, Written  Provided to: Patient  Level of Understanding: Teach back education performed     PT OP Goals     Row Name 10/17/19 1500 10/17/19 1340       PT Short Term Goals    STG Date to Achieve  --  10/31/19  -BS    STG 1  --  Pt independent with HEP  -BS    STG 1 Progress  --  New  -BS    STG 2  --  Improve R hip flex MMT to 3+/5   -BS    STG 2 Progress  --  New  -BS    STG 3  --  Improve R hip flexion AROM to 75 degrees  -BS    STG 3 Progress  --  New  -BS    STG 4  --  Able to ambulate with a SPC with a minimally antalgic gait pattern  -BS    STG 4 Progress  --  New  -BS    STG 5  --  Improve R knee ext MMT to 4+/5  -BS    STG 5 Progress  --  New  -BS        Long Term Goals    LTG Date to Achieve  --  11/14/19  -BS    LTG 1  --  Improve R hip flex/R knee ext MMT to 4+/5   -BS    LTG 1 Progress  --  New  -BS    LTG 2  --  Improve R hip flexion AROM to >/=90 degrees  -BS    LTG 2 Progress  --  New  -BS    LTG 3  --  Able to ambulate with or without a SPC with a non antalgic gait pattern  -BS    LTG 3 Progress  --  New  -BS    LTG 4  --  Improve LEFS score to >/= 50  -BS    LTG 4 Progress  --  New  -BS       Time Calculation    PT Goal Re-Cert Due Date  11/07/19  -BS  11/07/19  -BS      User Key  (r) = Recorded By, (t) = Taken By, (c) = Cosigned By    Initials Name Provider Type    BS Sander Gay, PT Physical Therapist          PT Assessment/Plan     Row Name 10/17/19 1340          PT Assessment    Functional Limitations  Impaired gait;Performance in leisure activities;Performance in sport activities;Performance in work activities  -BS     Impairments  Balance;Endurance;Gait;Impaired flexibility;Muscle strength;Pain;Range of motion;Sensation  -BS     Assessment Comments  Acute R hip pain s/p anterior R BOUCHRA on 10/14/19.  -BS     Please refer to paper survey for additional self-reported information  Yes  -BS     Rehab Potential  Good  -BS     Patient/caregiver participated in establishment of treatment plan and goals  Yes  -BS     Patient would benefit from skilled therapy intervention  Yes  -BS        PT Plan    PT Frequency  2x/week  -BS     Predicted Duration of Therapy Intervention (Therapy Eval)  4 weeks  -BS     Planned CPT's?  PT EVAL MOD COMPLELITY: 08188;PT RE-EVAL: 49496;PT THER PROC EA 15 MIN: 57648;PT THER ACT EA 15 MIN: 52300;PT MANUAL THERAPY EA 15 MIN: 78102;PT NEUROMUSC RE-EDUCATION EA 15 MIN: 36041;PT GAIT TRAINING EA 15 MIN: 19686;PT ELECTRICAL STIM UNATTEND: ;PT HOT/COLD PACK WC NONMCARE: 00522;PT THER SUPP EA 15 MIN  -BS     Physical Therapy Interventions (Optional Details)  balance training;gait training;home exercise program;manual therapy  "techniques;modalities;neuromuscular re-education;patient/family education;ROM (Range of Motion);stair training;strengthening;stretching;transfer training  -BS     PT Plan Comments  address hip flexor and quad weakness, improve hip flex ROM as able. Instruct in fwd lunge S at the steps.  -BS       User Key  (r) = Recorded By, (t) = Taken By, (c) = Cosigned By    Initials Name Provider Type    Sander Da Silva, PT Physical Therapist          Modalities     Row Name 10/17/19 1340             Ice    Ice Applied  No given ice to go  -BS        User Key  (r) = Recorded By, (t) = Taken By, (c) = Cosigned By    Initials Name Provider Type    Sander Da Silva, PT Physical Therapist        OP Exercises     Row Name 10/17/19 1500             Subjective Comments    Subjective Comments  65 yo male s/p R BOUCHRA (anterior) on 10/14/17. One night hospital stay.  -BS         Subjective Pain    Able to rate subjective pain?  yes  -BS      Pre-Treatment Pain Level  2  -BS      Post-Treatment Pain Level  3  -BS         Exercise 1    Exercise Name 1  H/L marching in place  -BS      Sets 1  1  -BS      Reps 1  10 ea  -BS         Exercise 2    Exercise Name 2  bridges  -BS      Sets 2  1  -BS      Reps 2  10  -BS         Exercise 3    Exercise Name 3  iso B hip add in H/L  -BS      Sets 3  1  -BS      Reps 3  15  -BS      Time 3  5\" hold  -BS         Exercise 4    Exercise Name 4  SKC S w/ towel  -BS      Sets 4  1  -BS      Reps 4  10  -BS      Time 4  2\" hold  -BS      Additional Comments  R only  -BS         Exercise 5    Exercise Name 5  R LAQ's  -BS      Sets 5  1  -BS      Reps 5  20    -BS      Additional Comments  R only  -BS        User Key  (r) = Recorded By, (t) = Taken By, (c) = Cosigned By    Initials Name Provider Type    Sander Da Silva, PT Physical Therapist                        Outcome Measure Options: Lower Extremity Functional Scale (LEFS)  Lower Extremity Functional Index  Any of your usual work, housework or school " activities: Quite a bit of difficulty  Your usual hobbies, recreational or sporting activities: Moderate difficulty  Getting into or out of the bath: Moderate difficulty  Walking between rooms: A little bit of difficulty  Putting on your shoes or socks: Moderate difficulty  Squatting: Quite a bit of difficulty  Lifting an object, like a bag of groceries from the floor: Moderate difficulty  Performing light activities around your home: A little bit of difficulty  Performing heavy activities around your home: Quite a bit of difficulty  Getting into or out of a car: Moderate difficulty  Walking 2 blocks: A little bit of difficulty  Walking a mile: Quite a bit of difficulty  Going up or down 10 stairs (about 1 flight of stairs): Moderate difficulty  Standing for 1 hour: Quite a bit of difficulty  Sitting for 1 hour: Moderate difficulty  Running on even ground: Extreme difficulty or unable to perform activity  Running on uneven ground: Extreme difficulty or unable to perform activity  Making sharp turns while running fast: Moderate difficulty  Hopping: Quite a bit of difficulty  Rolling over in bed: Moderate difficulty  Total: 33      Time Calculation:     Start Time: 1340  Stop Time: 1423  Time Calculation (min): 43 min  Total Timed Code Minutes- PT: 43 minute(s)     Therapy Charges for Today     Code Description Service Date Service Provider Modifiers Qty    30001652000 HC PT EVAL MOD COMPLEXITY 2 10/17/2019 Sander Gay, PT GP 1    63651258192 HC PT THER PROC EA 15 MIN 10/17/2019 Sander Gay, PT GP 1          PT G-Codes  Outcome Measure Options: Lower Extremity Functional Scale (LEFS)  Total: 33         Sander Gay PT  10/17/2019

## 2019-10-17 NOTE — PROGRESS NOTES
Spoke with Mr. Infante over the telephone    Reviewed current lab/INR which is 1.31 (Goal 1.7-2.5)    Pt reports no s/s of bleeding.   No changes to their medications.  No missed doses of warfarin.   No extreme changes in their diet    Reviewed schedule of 5 mg daily  Next INR on Monday provided by  Mad - patient picked this day as he is coming for therapy as well.  Pt read back regimen and verbalized understanding    Edson Caldwell RPH  10/17/19  5:02 PM

## 2019-10-19 ENCOUNTER — TELEPHONE (OUTPATIENT)
Dept: ORTHOPEDIC SURGERY | Facility: CLINIC | Age: 64
End: 2019-10-19

## 2019-10-19 NOTE — TELEPHONE ENCOUNTER
PHONE CALL:    Patient was contacted by phone to ensure that there were no issues.  All questions were answered.  Patient conveyed that they were doing well and that there were no current problems.  Patient will keep their current follow-up appointment.    10/19/19 at 1:23 PM by Nathaniel Kate MD

## 2019-10-21 ENCOUNTER — LAB (OUTPATIENT)
Dept: LAB | Facility: HOSPITAL | Age: 64
End: 2019-10-21

## 2019-10-21 ENCOUNTER — HOSPITAL ENCOUNTER (OUTPATIENT)
Dept: PHYSICAL THERAPY | Facility: HOSPITAL | Age: 64
Setting detail: THERAPIES SERIES
Discharge: HOME | End: 2019-10-21

## 2019-10-21 ENCOUNTER — ANTICOAGULATION VISIT (OUTPATIENT)
Dept: PHARMACY | Facility: HOSPITAL | Age: 64
End: 2019-10-21

## 2019-10-21 DIAGNOSIS — M16.11 PRIMARY OSTEOARTHRITIS OF RIGHT HIP: ICD-10-CM

## 2019-10-21 DIAGNOSIS — M25.551 RIGHT HIP PAIN: Primary | ICD-10-CM

## 2019-10-21 DIAGNOSIS — Z79.01 LONG TERM (CURRENT) USE OF ANTICOAGULANTS: ICD-10-CM

## 2019-10-21 LAB
INR PPP: 2.97 (ref 0.8–1.2)
PROTHROMBIN TIME: 30.6 SECONDS (ref 11.1–15.3)

## 2019-10-21 PROCEDURE — 36415 COLL VENOUS BLD VENIPUNCTURE: CPT

## 2019-10-21 PROCEDURE — 97110 THERAPEUTIC EXERCISES: CPT

## 2019-10-21 PROCEDURE — 85610 PROTHROMBIN TIME: CPT

## 2019-10-21 NOTE — THERAPY TREATMENT NOTE
Outpatient Physical Therapy Ortho Treatment Note  [facility]     Patient Name: Sam Infante  : 1955  MRN: [id number]  Today's Date: 10/21/2019      Visit Date: 10/21/2019     Subjective Improvement: 0%  Attendance:  2/2 (87 remain at eval)  Next MD Visit : 10/28/19  Recert Date:  19      Therapy Diagnosis:  S/P R Anterior BOUCHRA 10/14/19        Visit Dx:    ICD-10-CM ICD-9-CM   1. Right hip pain M25.551 719.45   2. Primary osteoarthritis of right hip M16.11 715.15       Patient Active Problem List   Diagnosis   • Borderline glaucoma, open angle with borderline findings   • Borderline glaucoma   • Left hand pain   • Bilateral carpal tunnel syndrome   • Right hip pain   • Primary osteoarthritis of right hip   • Essential hypertension   • Attention deficit hyperactivity disorder   • Benign prostatic hyperplasia   • Fatigue   • Gastroesophageal reflux disease without esophagitis   • Hyperlipidemia   • Mixed anxiety and depressive disorder   • BERNADETTE (obstructive sleep apnea)   • Routine physical examination        Past Medical History:   Diagnosis Date   • Arthritis    • Elevated cholesterol    • GERD (gastroesophageal reflux disease)     OCCASIONAL   • High blood pressure    • Kidney stone    • Sleep apnea     using c-pap   • Vitreous floater 2013        Past Surgical History:   Procedure Laterality Date   • COLONOSCOPY     • RHINOPLASTY     • SEPTOPLASTY  2013    Nasal surgery procedure (Nasal septoplasty.)   • TOTAL HIP ARTHROPLASTY Right 10/14/2019    Procedure: TOTAL HIP ARTHROPLASTY ANTERIOR;  Surgeon: Nathaniel Kate MD;  Location: [facility];  Service: Orthopedics       PT Ortho     Row Name 10/21/19 1400       Precautions and Contraindications    Precautions/Limitations  anterior hip precautions- right  -KH       Posture/Observations    Posture/Observations Comments  gait with RW, non antalgic  -KH      User Key  (r) = Recorded By, (t) = Taken By, (c) = Cosigned By     "Initials Name Provider Type    Kelley Jim PTA Physical Therapy Assistant                      PT Assessment/Plan     Row Name 10/21/19 1400          PT Assessment    Assessment Comments  no change in HEP; progressing with ROM, strength and gait; some pain with standing activties but toleates well. pt to start riding bike at home   -        PT Plan    PT Frequency  2x/week  -     Predicted Duration of Therapy Intervention (Therapy Eval)  4 weeks  -     PT Plan Comments  Continue to progress as pt tolerates. Pt to bring cane next visit; progress gait with cane as able.   -       User Key  (r) = Recorded By, (t) = Taken By, (c) = Cosigned By    Initials Name Provider Type    Kelley Jim PTA Physical Therapy Assistant          Modalities     Row Name 10/21/19 1400             Subjective Pain    Post-Treatment Pain Level  3  -KH         Ice    Patient reports will apply ice at home to involved area  Yes  -        User Key  (r) = Recorded By, (t) = Taken By, (c) = Cosigned By    Initials Name Provider Type    Kelley Jim PTA Physical Therapy Assistant        OP Exercises     Row Name 10/21/19 1400             Subjective Comments    Subjective Comments  Pt reports that he is doing well. Minimal swelling and pain noted. Taking some steps at home without walker  -         Subjective Pain    Able to rate subjective pain?  yes  -KH      Pre-Treatment Pain Level  2  -KH      Post-Treatment Pain Level  3  -KH         Exercise 1    Exercise Name 1  pro ll LE strength  -KH      Time 1  10'  -KH      Additional Comments  level 1; seat 10  -KH         Exercise 2    Exercise Name 2  incline stretch  -KH      Cueing 2  Verbal;Demo  -KH      Sets 2  3  -KH      Time 2  30  -KH         Exercise 3    Exercise Name 3  standing lunge hip flexor stretch  -KH      Cueing 3  Verbal  -KH      Sets 3  3  -KH      Time 3  30\"  -KH         Exercise 4    Exercise Name 4  CR/TR  -KH      Cueing 4  Verbal  -KH      Sets 4  2 "  -KH      Reps 4  10  -KH         Exercise 5    Exercise Name 5  standing hip abd  -KH      Cueing 5  Verbal  -KH      Sets 5  2  -KH      Reps 5  10  -KH      Additional Comments  R only  -KH         Exercise 6    Exercise Name 6  standing hip flexion  -KH      Cueing 6  Verbal  -KH      Sets 6  2  -KH      Reps 6  10  -KH      Additional Comments  R only   -KH         Exercise 7    Exercise Name 7  Bridges  -KH      Cueing 7  Verbal  -KH      Sets 7  2  -KH      Reps 7  10  -KH         Exercise 8    Exercise Name 8  Ham sets with feet on ball   -KH      Cueing 8  Verbal  -KH      Sets 8  2  -KH      Reps 8  10  -KH         Exercise 9    Exercise Name 9  DKTC w/ feet on ball  -KH      Cueing 9  Verbal  -KH      Sets 9  2  -KH      Reps 9  10  -KH         Exercise 10    Exercise Name 10  LAQ  -KH      Cueing 10  Verbal  -KH      Sets 10  2  -KH      Reps 10  10  -KH        User Key  (r) = Recorded By, (t) = Taken By, (c) = Cosigned By    Initials Name Provider Type    Kelley Jim PTA Physical Therapy Assistant                       PT OP Goals     Row Name 10/21/19 1400          PT Short Term Goals    STG Date to Achieve  10/31/19  -KH     STG 1  Pt independent with HEP  -KH     STG 1 Progress  Ongoing  -KH     STG 2  Improve R hip flex MMT to 3+/5   -KH     STG 2 Progress  Progressing  -KH     STG 3  Improve R hip flexion AROM to 75 degrees  -KH     STG 3 Progress  Progressing  -KH     STG 4  Able to ambulate with a SPC with a minimally antalgic gait pattern  -KH     STG 4 Progress  Progressing  -KH     STG 5  Improve R knee ext MMT to 4+/5  -KH     STG 5 Progress  Progressing  -KH        Long Term Goals    LTG Date to Achieve  11/14/19  -KH     LTG 1  Improve R hip flex/R knee ext MMT to 4+/5   -KH     LTG 1 Progress  Progressing  -KH     LTG 2  Improve R hip flexion AROM to >/=90 degrees  -KH     LTG 2 Progress  Progressing  -KH     LTG 3  Able to ambulate with or without a SPC with a non antalgic gait  pattern  -     LTG 3 Progress  Progressing  -     LTG 4  Improve LEFS score to >/= 50  -     LTG 4 Progress  Progressing  -        Time Calculation    PT Goal Re-Cert Due Date  11/07/19  -       User Key  (r) = Recorded By, (t) = Taken By, (c) = Cosigned By    Initials Name Provider Type    Kelley Jim PTA Physical Therapy Assistant                         Time Calculation:   Start Time: 1450  Stop Time: 1528  Time Calculation (min): 38 min  Total Timed Code Minutes- PT: 38 minute(s)  Therapy Charges for Today     Code Description Service Date Service Provider Modifiers Qty    63353404463 HC PT THER PROC EA 15 MIN 10/21/2019 Kelley Martines PTA GP 3                    Kelley Martines PTA  10/21/2019

## 2019-10-22 NOTE — PROGRESS NOTES
Spoke with Mr. Infante over the telephone    Reviewed current lab/INR which is 2.97 (Goal 1.7-2.5)    Pt reports no s/s of bleeding.   No changes to their medications.  No missed doses of warfarin.   No extreme changes in their diet    At the time the patient was finally reached, the patient had already taken his dose of warfarin for tonight (5 mg)      I asked patient if he could possibly eat a small serving of greens tonight, but he states he already had a serving at dinner. I told him this would help bring his INR back into the goal range. His next appointment for therapy is Wednesday, but this being so soon, he requested getting his INR checked with his appointment next Monday. I told him that would be reasonable.    Reviewed schedule for following week of: 2.5 mg nightly with 5 mg on Thursday   Next INR on 10/28 provided by tae  Pt read back regimen and verbalized understanding    Edson Caldwell RPH  10/21/19  7:25 PM

## 2019-10-23 ENCOUNTER — HOSPITAL ENCOUNTER (OUTPATIENT)
Dept: PHYSICAL THERAPY | Facility: HOSPITAL | Age: 64
Setting detail: THERAPIES SERIES
Discharge: HOME | End: 2019-10-23

## 2019-10-23 DIAGNOSIS — M16.11 PRIMARY OSTEOARTHRITIS OF RIGHT HIP: ICD-10-CM

## 2019-10-23 DIAGNOSIS — M25.551 RIGHT HIP PAIN: Primary | ICD-10-CM

## 2019-10-23 PROCEDURE — 97110 THERAPEUTIC EXERCISES: CPT

## 2019-10-23 NOTE — THERAPY TREATMENT NOTE
Outpatient Physical Therapy Ortho Treatment Note  [facility]     Patient Name: Sam Infante  : 1955  MRN: [id number]  Today's Date: 10/23/2019      Visit Date: 10/23/2019  Subjective Improvement: 0%  Attendance:  3/3 (87 remain at eval)  Next MD Visit : 10/28/19  Recert Date:  19  Visit Dx:    ICD-10-CM ICD-9-CM   1. Right hip pain M25.551 719.45   2. Primary osteoarthritis of right hip M16.11 715.15       Patient Active Problem List   Diagnosis   • Borderline glaucoma, open angle with borderline findings   • Borderline glaucoma   • Left hand pain   • Bilateral carpal tunnel syndrome   • Right hip pain   • Primary osteoarthritis of right hip   • Essential hypertension   • Attention deficit hyperactivity disorder   • Benign prostatic hyperplasia   • Fatigue   • Gastroesophageal reflux disease without esophagitis   • Hyperlipidemia   • Mixed anxiety and depressive disorder   • BERNADETTE (obstructive sleep apnea)   • Routine physical examination        Past Medical History:   Diagnosis Date   • Arthritis    • Elevated cholesterol    • GERD (gastroesophageal reflux disease)     OCCASIONAL   • High blood pressure    • Kidney stone    • Sleep apnea     using c-pap   • Vitreous floater 2013        Past Surgical History:   Procedure Laterality Date   • COLONOSCOPY     • RHINOPLASTY     • SEPTOPLASTY  2013    Nasal surgery procedure (Nasal septoplasty.)   • TOTAL HIP ARTHROPLASTY Right 10/14/2019    Procedure: TOTAL HIP ARTHROPLASTY ANTERIOR;  Surgeon: Nathaniel Kate MD;  Location: [facility];  Service: Orthopedics       PT Ortho     Row Name 10/23/19 1400       Precautions and Contraindications    Precautions/Limitations  anterior hip precautions- right  -       Posture/Observations    Posture/Observations Comments  Amb w/ FWW, mild AG.   -JW    Row Name 10/21/19 1400       Precautions and Contraindications    Precautions/Limitations  anterior hip precautions- right  -        "Posture/Observations    Posture/Observations Comments  gait with RW, non antalgic  -      User Key  (r) = Recorded By, (t) = Taken By, (c) = Cosigned By    Initials Name Provider Type    Aron Larsen PTA Physical Therapy Assistant    [facility] Physical Therapy Assistant                      PT Assessment/Plan     Row Name 10/23/19 1400          PT Assessment    Assessment Comments  Good nazanin of today's visit despite some increased sorenes. Continued need for increased hip strength belem flex.   -JW        PT Plan    PT Frequency  2x/week  -JW     Predicted Duration of Therapy Intervention (Therapy Eval)  4 weeks  -JW     PT Plan Comments  Add SPC amb.  -JW       User Key  (r) = Recorded By, (t) = Taken By, (c) = Cosigned By    Initials Name Provider Type    Aorn Larsen PTA Physical Therapy Assistant          Modalities     Row Name 10/23/19 1400             Subjective Pain    Post-Treatment Pain Level  -- decreased  -JW         Ice    Ice Applied  Yes  -JW      Location  R hip  -JW      Rx Minutes  10 mins  -JW      Ice S/P Rx  Yes  -JW        User Key  (r) = Recorded By, (t) = Taken By, (c) = Cosigned By    Initials Name Provider Type    Aron Larsen PTA Physical Therapy Assistant        OP Exercises     Row Name 10/23/19 1400             Subjective Comments    Subjective Comments  Pt reports some minor increased pain today. Unknown cause.   -JW         Subjective Pain    Able to rate subjective pain?  yes  -JW      Pre-Treatment Pain Level  4  -JW      Post-Treatment Pain Level  -- decreased  -JW         Exercise 1    Exercise Name 1  pro ll LE strength  -JW      Time 1  8'  -JW      Additional Comments  L3  -JW         Exercise 2    Exercise Name 2  incline stretch  -JW      Sets 2  2  -JW      Time 2  30\"  -JW         Exercise 3    Exercise Name 3  standing lunge hip flexor stretch  -JW      Sets 3  22  -JW      Reps 3  10  -JW         Exercise 4    Exercise Name 4  SAQ  -JW   " "   Reps 4  20  -JW      Additional Comments  1#  -JW         Exercise 5    Exercise Name 5  GS  -JW      Reps 5  20  -JW         Exercise 6    Exercise Name 6  H/L hip add squeeze  -JW      Reps 6  30  -JW         Exercise 7    Exercise Name 7  LAQ  -JW      Reps 7  20  -JW      Time 7  3-4\"  -JW      Additional Comments  3#  -JW         Exercise 8    Exercise Name 8  Sidestep at tape table  -JW      Reps 8  3 trips  -JW         Exercise 9    Exercise Name 9  ST hip flex  -JW      Sets 9  2  -JW      Reps 9  10  -JW        User Key  (r) = Recorded By, (t) = Taken By, (c) = Cosigned By    Initials Name Provider Type    Aron Larsen PTA Physical Therapy Assistant                       PT OP Goals     Row Name 10/23/19 1400          PT Short Term Goals    STG Date to Achieve  10/31/19  -JW     STG 1  Pt independent with HEP  -JW     STG 1 Progress  Ongoing  -JW     STG 2  Improve R hip flex MMT to 3+/5   -JW     STG 2 Progress  Progressing  -JW     STG 3  Improve R hip flexion AROM to 75 degrees  -JW     STG 3 Progress  Progressing  -JW     STG 4  Able to ambulate with a SPC with a minimally antalgic gait pattern  -JW     STG 4 Progress  Progressing  -JW     STG 5  Improve R knee ext MMT to 4+/5  -JW     STG 5 Progress  Progressing  -JW        Long Term Goals    LTG Date to Achieve  11/14/19  -JW     LTG 1  Improve R hip flex/R knee ext MMT to 4+/5   -JW     LTG 1 Progress  Progressing  -JW     LTG 2  Improve R hip flexion AROM to >/=90 degrees  -JW     LTG 2 Progress  Progressing  -JW     LTG 3  Able to ambulate with or without a SPC with a non antalgic gait pattern  -JW     LTG 3 Progress  Progressing  -JW     LTG 4  Improve LEFS score to >/= 50  -JW     LTG 4 Progress  Progressing  -JW       User Key  (r) = Recorded By, (t) = Taken By, (c) = Cosigned By    Initials Name Provider Type    Aron Larsen PTA Physical Therapy Assistant                         Time Calculation:   Start Time: 1430  Stop " Time: 1528  Time Calculation (min): 58 min  PT Non-Billable Time (min): 10 min  Total Timed Code Minutes- PT: 48 minute(s)  Therapy Charges for Today     Code Description Service Date Service Provider Modifiers Qty    46657334456 HC PT THER SUPP EA 15 MIN 10/23/2019 Aron Ratliff PTA GP 1    87715208892 HC PT THER PROC EA 15 MIN 10/23/2019 Aron Ratliff PTA GP 3                    Aron Ratliff PTA  10/23/2019

## 2019-10-25 DIAGNOSIS — M16.11 PRIMARY OSTEOARTHRITIS OF RIGHT HIP: ICD-10-CM

## 2019-10-25 DIAGNOSIS — M25.551 RIGHT HIP PAIN: Primary | ICD-10-CM

## 2019-10-28 ENCOUNTER — OFFICE VISIT (OUTPATIENT)
Dept: ORTHOPEDIC SURGERY | Facility: CLINIC | Age: 64
End: 2019-10-28

## 2019-10-28 ENCOUNTER — ANTICOAGULATION VISIT (OUTPATIENT)
Dept: PHARMACY | Facility: HOSPITAL | Age: 64
End: 2019-10-28

## 2019-10-28 ENCOUNTER — TELEPHONE (OUTPATIENT)
Dept: ORTHOPEDIC SURGERY | Facility: CLINIC | Age: 64
End: 2019-10-28

## 2019-10-28 ENCOUNTER — HOSPITAL ENCOUNTER (OUTPATIENT)
Dept: PHYSICAL THERAPY | Facility: HOSPITAL | Age: 64
Setting detail: THERAPIES SERIES
Discharge: HOME | End: 2019-10-28

## 2019-10-28 ENCOUNTER — LAB (OUTPATIENT)
Dept: LAB | Facility: HOSPITAL | Age: 64
End: 2019-10-28

## 2019-10-28 VITALS — HEIGHT: 66 IN | BODY MASS INDEX: 31.57 KG/M2 | WEIGHT: 196.4 LBS

## 2019-10-28 DIAGNOSIS — Z79.01 LONG TERM (CURRENT) USE OF ANTICOAGULANTS: ICD-10-CM

## 2019-10-28 DIAGNOSIS — Z13.820 SCREENING FOR OSTEOPOROSIS: Primary | ICD-10-CM

## 2019-10-28 DIAGNOSIS — M25.551 RIGHT HIP PAIN: ICD-10-CM

## 2019-10-28 DIAGNOSIS — M16.11 PRIMARY OSTEOARTHRITIS OF RIGHT HIP: ICD-10-CM

## 2019-10-28 DIAGNOSIS — M25.551 RIGHT HIP PAIN: Primary | ICD-10-CM

## 2019-10-28 LAB
INR PPP: 5.87 (ref 0.8–1.2)
PROTHROMBIN TIME: 52.4 SECONDS (ref 11.1–15.3)

## 2019-10-28 PROCEDURE — 97110 THERAPEUTIC EXERCISES: CPT

## 2019-10-28 PROCEDURE — 36415 COLL VENOUS BLD VENIPUNCTURE: CPT

## 2019-10-28 PROCEDURE — 85610 PROTHROMBIN TIME: CPT

## 2019-10-28 PROCEDURE — 99024 POSTOP FOLLOW-UP VISIT: CPT | Performed by: NURSE PRACTITIONER

## 2019-10-28 RX ORDER — GABAPENTIN 100 MG/1
100 CAPSULE ORAL 3 TIMES DAILY
Qty: 45 CAPSULE | Refills: 0 | Status: SHIPPED | OUTPATIENT
Start: 2019-10-28 | End: 2019-11-12

## 2019-10-28 NOTE — PROGRESS NOTES
Spoke with  Naresh after two attempts. Reviewed current lab.  No s/s of bleeding. No new meds. No missed doses.     INR 5.87, supratherapeutic.  Trended upwards past week after a dose reduction after last INR of 2.97.  Today is day 14.  No missed doses, did take as instructed this past week  No new medications.  No significant findings/no reddish urine/no dark tarry stools  Patient does work part time right now, but at a computer at [employer], no physical or stressful work that might lead to bleeding issues  Patient does drink wine occassionally at night, instructed to hold wine until next INR at least    Currently paging Brijesh Landers at ' office with above information.  Lab called pharmacy and Dr. Blankenship's office with result.    Instructed to hold until Friday, recheck on Friday.    Will most likely need to call in new warfarin prescription to [facility] as warfarin 2.5 mg led to supratherapeutic value and quartering tablets is not as reliable for dosing.  Will review new warfarin dose upon next INR check.    Reviewed schedule for following week. Pt read back regimen and verbalized understanding. All questions answered. Next INR on 11/1 provided by SALMA.    Amarjit Chavez [facility]  10/28/19  11:23 AM

## 2019-10-28 NOTE — THERAPY TREATMENT NOTE
Outpatient Physical Therapy Ortho Treatment Note  [facility]     Patient Name: Sam Infante  : 1955  MRN: [id number]  Today's Date: 10/28/2019      Visit Date: 10/28/2019     Subjective Improvement: 0%  Attendance:  / (87 remain at eval)   Next MD Visit : 19  Recert Date:  19      Therapy Diagnosis:  S/P R BOUCHRA 10/14/19        Visit Dx:    ICD-10-CM ICD-9-CM   1. Right hip pain M25.551 719.45   2. Primary osteoarthritis of right hip M16.11 715.15       Patient Active Problem List   Diagnosis   • Borderline glaucoma, open angle with borderline findings   • Borderline glaucoma   • Left hand pain   • Bilateral carpal tunnel syndrome   • Right hip pain   • Primary osteoarthritis of right hip   • Essential hypertension   • Attention deficit hyperactivity disorder   • Benign prostatic hyperplasia   • Fatigue   • Gastroesophageal reflux disease without esophagitis   • Hyperlipidemia   • Mixed anxiety and depressive disorder   • BERNADETTE (obstructive sleep apnea)   • Routine physical examination        Past Medical History:   Diagnosis Date   • Arthritis    • Elevated cholesterol    • GERD (gastroesophageal reflux disease)     OCCASIONAL   • High blood pressure    • Kidney stone    • Sleep apnea     using c-pap   • Vitreous floater 2013        Past Surgical History:   Procedure Laterality Date   • COLONOSCOPY     • RHINOPLASTY     • SEPTOPLASTY  2013    Nasal surgery procedure (Nasal septoplasty.)   • TOTAL HIP ARTHROPLASTY Right 10/14/2019    Procedure: TOTAL HIP ARTHROPLASTY ANTERIOR;  Surgeon: Nathaniel Kate MD;  Location: [facility];  Service: Orthopedics       PT Ortho     Row Name 10/28/19 1600       Precautions and Contraindications    Precautions/Limitations  anterior hip precautions- right  -       Posture/Observations    Posture/Observations Comments  gait with quad cane;   -      User Key  (r) = Recorded By, (t) = Taken By, (c) = Cosigned By    Initials Name  "Provider Type    Kelley Jim PTA Physical Therapy Assistant                      PT Assessment/Plan     Row Name 10/28/19 1600          PT Assessment    Assessment Comments  continues with limited hip flexion on R; added SKTC stretch to HEP this date; quad tone and gait improving. minimal deviations noted with gait   -        PT Plan    PT Frequency  2x/week  -DEE     Predicted Duration of Therapy Intervention (Therapy Eval)  4 weeks  -     PT Plan Comments  Continue to progress gait with and without cane; Monitor nerve pain and may need to check alignement in Low back; Increased strength as able.   -       User Key  (r) = Recorded By, (t) = Taken By, (c) = Cosigned By    Initials Name Provider Type    Kelley Jim PTA Physical Therapy Assistant          Modalities     Row Name 10/28/19 1600             Subjective Pain    Post-Treatment Pain Level  3  -         Ice    Patient reports will apply ice at home to involved area  Yes Ice to go   -        User Key  (r) = Recorded By, (t) = Taken By, (c) = Cosigned By    Initials Name Provider Type    Kelley Jim PTA Physical Therapy Assistant        OP Exercises     Row Name 10/28/19 1600             Subjective Comments    Subjective Comments  got rid of walker yesterday and is doing fine with quad cane; Saw MD today, states that everything looks good; Only having pain in the right lateral leg from where the drain tube was;  Md gave him nurotin for it today.   -         Subjective Pain    Able to rate subjective pain?  yes  -      Pre-Treatment Pain Level  3  -KH      Post-Treatment Pain Level  3  -KH         Exercise 1    Exercise Name 1  pro ll LE strength   -      Cueing 1  Verbal  -KH      Time 1  10'  -KH      Additional Comments  level 3; seat 9  -KH         Exercise 2    Exercise Name 2  incline stretch  -      Cueing 2  Verbal  -KH      Sets 2  3  -KH      Time 2  30\"  -KH         Exercise 3    Exercise Name 3  standing lunge stretch for " "hip flexor   -KH      Cueing 3  Verbal  -KH      Sets 3  3  -KH      Time 3  30\"  -KH         Exercise 4    Exercise Name 4  CR/TR  -KH      Cueing 4  Verbal  -KH      Sets 4  2  -KH      Reps 4  10  -KH         Exercise 5    Exercise Name 5  step ups fwd  -KH      Cueing 5  Verbal  -KH      Sets 5  2  -KH      Reps 5  10  -KH      Additional Comments  4\" step   -KH         Exercise 6    Exercise Name 6  step ups latera  -KH      Cueing 6  Verbal  -KH      Sets 6  2  -KH      Reps 6  10  -KH      Additional Comments  4inch step   -KH         Exercise 7    Exercise Name 7  LAQ  -KH      Cueing 7  Verbal  -KH      Sets 7  3  -KH      Reps 7  10  -KH      Additional Comments  2# aw  -KH         Exercise 8    Exercise Name 8  seated hip flexion  -KH      Cueing 8  Verbal  -KH      Sets 8  2  -KH      Reps 8  10  -KH         Exercise 9    Exercise Name 9  SKTC stretch  -KH      Cueing 9  Verbal  -KH      Sets 9  3  -KH      Time 9  30\"  -KH      Additional Comments  added to HEp   -KH        User Key  (r) = Recorded By, (t) = Taken By, (c) = Cosigned By    Initials Name Provider Type    Kelley Jim, JANESSA Physical Therapy Assistant                       PT OP Goals     Row Name 10/28/19 1600          PT Short Term Goals    STG Date to Achieve  10/31/19  -KH     STG 1  Pt independent with HEP  -KH     STG 1 Progress  Ongoing  -KH     STG 2  Improve R hip flex MMT to 3+/5   -KH     STG 2 Progress  Progressing  -KH     STG 3  Improve R hip flexion AROM to 75 degrees  -KH     STG 3 Progress  Progressing  -KH     STG 4  Able to ambulate with a SPC with a minimally antalgic gait pattern  -KH     STG 4 Progress  Progressing  -KH     STG 5  Improve R knee ext MMT to 4+/5  -KH     STG 5 Progress  Progressing  -KH        Long Term Goals    LTG Date to Achieve  11/14/19  -KH     LTG 1  Improve R hip flex/R knee ext MMT to 4+/5   -KH     LTG 1 Progress  Progressing  -KH     LTG 2  Improve R hip flexion AROM to >/=90 degrees  -KH  "    LTG 2 Progress  Progressing  -     LTG 3  Able to ambulate with or without a SPC with a non antalgic gait pattern  -     LTG 3 Progress  Progressing  -     LTG 4  Improve LEFS score to >/= 50  -     LTG 4 Progress  Progressing  -        Time Calculation    PT Goal Re-Cert Due Date  11/07/19  -       User Key  (r) = Recorded By, (t) = Taken By, (c) = Cosigned By    Initials Name Provider Type    Kelley Jim PTA Physical Therapy Assistant                         Time Calculation:   Start Time: 1602  Stop Time: 1642  Time Calculation (min): 40 min  Total Timed Code Minutes- PT: 40 minute(s)  Therapy Charges for Today     Code Description Service Date Service Provider Modifiers Qty    56927002434 HC PT THER PROC EA 15 MIN 10/28/2019 Kelley Martines PTA GP 3                    Kelley Martines PTA  10/28/2019

## 2019-10-28 NOTE — PROGRESS NOTES
Spoke with Brijesh, Brijesh did see patient today as well, Brijesh did tell me he started on low dose gabapentin just within the past few days, no significant interactions.  Wife is a nurse and is aware to monitor bleeding issues as well.

## 2019-10-31 ENCOUNTER — APPOINTMENT (OUTPATIENT)
Dept: PHYSICAL THERAPY | Facility: HOSPITAL | Age: 64
End: 2019-10-31

## 2019-10-31 ENCOUNTER — OFFICE VISIT (OUTPATIENT)
Dept: ORTHOPEDIC SURGERY | Facility: CLINIC | Age: 64
End: 2019-10-31

## 2019-10-31 VITALS — HEIGHT: 66 IN | BODY MASS INDEX: 27.16 KG/M2 | WEIGHT: 169 LBS

## 2019-10-31 DIAGNOSIS — D68.59 HYPERPROTHROMBINEMIA (HCC): ICD-10-CM

## 2019-10-31 DIAGNOSIS — M16.11 PRIMARY OSTEOARTHRITIS OF RIGHT HIP: Primary | ICD-10-CM

## 2019-10-31 PROBLEM — Z96.641 HISTORY OF TOTAL HIP ARTHROPLASTY, RIGHT: Status: ACTIVE | Noted: 2019-10-31

## 2019-10-31 PROCEDURE — 99024 POSTOP FOLLOW-UP VISIT: CPT | Performed by: ORTHOPAEDIC SURGERY

## 2019-10-31 RX ORDER — OXYCODONE AND ACETAMINOPHEN 7.5; 325 MG/1; MG/1
1 TABLET ORAL EVERY 4 HOURS PRN
Qty: 18 TABLET | Refills: 0 | Status: SHIPPED | OUTPATIENT
Start: 2019-10-31 | End: 2019-11-05 | Stop reason: SDUPTHER

## 2019-10-31 NOTE — TELEPHONE ENCOUNTER
QUETA      Pt CALLED REQUESTING A REFILL OF   OXYCODONE ( PERCOCET) 7.5  LAST FILL 10/15/19      Pt STATES HE USES [facility]

## 2019-10-31 NOTE — PROGRESS NOTES
Postop Follow-up    Name:  Sam Infante  Date:  10/31/2019  :  1955    Chief Complaint:    Chief Complaint   Patient presents with   • Right Hip - Follow-up   • Wound Check     Date of surgery:            10/14/19 (17d) Nathaniel Kate MD    TOTAL HIP ARTHROPLASTY ANTERIOR - Right       Procedure:    History of Present Illness:  Patient states he was in a tremendous amount of pain this morning and went to [facility].  Ct  Abdomen /Pelvis was obtained.  Here today due to pain.  Pain scale today 9/10      Mr. Infante underwent uncomplicated hip arthroplasty on the right on .  He was doing well until about 2 days ago when he began having pain in his flank and groin.  There is been no trauma.  He is currently on Coumadin for DVT prophylaxis.  He was seen earlier today by Dr. Peters.  Laboratory investigation showed his INR was greater than 5.  He also had a CT scan of the abdomen and pelvis which showed a fracture of the acetabulum as well as apparent psoas bleed.  He has been voiding well.  His last bowel movement was about 2 days ago but he has been passing gas today.  He said he has not taken any Coumadin for about 2 days.  He was seen here 3 days ago.  The office note from that visit is not available.  However x-rays were done at that visit.     has asked that I see him in Dr. Kate absence.        Current Outpatient Medications:   •  dextroamphetamine (DEXEDRINE SPANSULE) 15 MG 24 hr capsule, Take 15 mg by mouth 2 (Two) Times a Day., Disp: , Rfl:   •  gabapentin (NEURONTIN) 100 MG capsule, Take 1 capsule by mouth 3 (Three) Times a Day for 15 days., Disp: 45 capsule, Rfl: 0  •  latanoprost (XALATAN) 0.005 % ophthalmic solution, Administer 1 drop to both eyes Every Night., Disp: 2.5 mL, Rfl: 12  •  metoprolol succinate XL (TOPROL-XL) 50 MG 24 hr tablet, Take 50 mg by mouth Daily., Disp: , Rfl:   •  Multiple Vitamins-Minerals (PRESERVISION AREDS PO), Take 2  tablets by mouth Daily., Disp: , Rfl:   •  ondansetron (ZOFRAN) 8 MG tablet, Take  by mouth Every 8 (Eight) Hours As Needed for Nausea or Vomiting., Disp: , Rfl:   •  oxyCODONE-acetaminophen (PERCOCET) 7.5-325 MG per tablet, Take 1 tablet by mouth Every 4 (Four) Hours As Needed for Moderate Pain., Disp: 18 tablet, Rfl: 0  •  SUMAtriptan (IMITREX) 20 MG/ACT nasal spray, Every 2 (Two) Hours As Needed., Disp: , Rfl:   •  verapamil PM (VERELAN PM) 360 MG 24 hr capsule, Take 360 mg by mouth daily., Disp: , Rfl:   •  warfarin (COUMADIN) 5 MG tablet, Take 1 tablet by mouth at bedtime or as directed per hospital pharmacy., Disp: 30 tablet, Rfl: 1  •  Warfarin Sodium (PHARMACY TO DOSE WARFARIN), Continuous As Needed (6 weeks)., Disp: , Rfl:     Allergies   Allergen Reactions   • Statins Other (See Comments)     Muscle pain         Exam: He is slightly drowsy from analgesics given earlier today.  However he responds appropriately to questions and commands and is in no apparent distress at rest.    Abdomen is somewhat distended but bowel sounds are normal.  There is mild tenderness in the right low to mid abdomen as well as in the right flank.  His surgical wound is clean and dry with no evidence of infection.  There is hypaesthesia soft touch immediately lateral to his wound.  Distal sensation is normal.  Dorsalis pedis pulse is strong.  Sciatic motor function is intact.    Radiographs of the hip dated 28 October reviewed.  There is a fracture of the medial wall the acetabulum.  There is an uncemented hip arthroplasty in satisfactory position.  There is no evidence of migration of his cup.    CT scan of the abdomen and pelvis done earlier today was reviewed.  There is a slightly comminuted fracture of the medial wall of the acetabulum.  The study is compromised somewhat by metal artifact.  Position of the acetabular implant is satisfactory.  There is prominent fullness of the iliopsoas on the right.  Vitals:    10/31/19 1530  "  Weight: 76.7 kg (169 lb)   Height: 167.6 cm (66\")               Xr Hip With Or Without Pelvis 2 - 3 View Right    Result Date: 10/28/2019  Narrative: Ordering Provider:  Brijesh Patterson APRN Ordering Diagnosis/Indication:  Right hip pain, Primary osteoarthritis of right hip Procedure:  XR HIP W OR WO PELVIS 2-3 VIEW RIGHT Exam Date:  10/28/19 COMPARISON:  Not applicable, no relevant images available.     Impression:  AP of the pelvis with 2 views of the right hip reveals a stable total hip arthroplasty with no evidence of hardware failure or other acute radiological abnormality noted. JOANIE Cortés 10/28/19         Assessment: 1 probable iliopsoas bleed secondary to hyperprothrombinemia.  2 fracture medial wall right acetabulum.  3 status post total hip arthroplasty.    I think his fracture is unrelated to his psoas bleed.    He was told to discontinue his Coumadin.  I would recommend withholding any Coumadin until his abdominal symptoms subside.  Think his acetabular component is likely stable.  However I think it is prudent to mid his weightbearing until evaluation by Dr. Kate.  I will discuss the patient's care with Dr. Kate and also review his imaging studies with him.  The patient and his wife understand if he has any worsening of his symptoms he should contact me promptly.  They were also instructed in activity restriction until his Coumadin is reversed.  [facility]   Ct abdomen and pelvis  1. Fracture medial wall acetabulum  2.  Interstitial bleeding along the right side of the pelvis that extends into the retroperitoneum just above the aortic bifurcation.  3.  15 x 4.7 cm hematoma in the right psoas muscle thickening of the right pararenal fascia.      Plan:        Return if symptoms worsen or fail to improve.      10/31/19 at 3:41 PM by Nathaniel Horn MD  "

## 2019-11-01 ENCOUNTER — ANTICOAGULATION VISIT (OUTPATIENT)
Dept: PHARMACY | Facility: HOSPITAL | Age: 64
End: 2019-11-01

## 2019-11-01 NOTE — PROGRESS NOTES
Spoke with patient's wife, patient had visit with Dr. Horn and patient has a hematoma and was instructed to hold warfarin. INR at office was still high per fingerstick per wife. Unable to see as the result does not appear to be loaded into Epic. Notified wife we will follow up Tuesday after the visit to see if Dr. Kate wants to resume warfarin or continue to hold, she voiced understanding.

## 2019-11-04 ENCOUNTER — APPOINTMENT (OUTPATIENT)
Dept: PHYSICAL THERAPY | Facility: HOSPITAL | Age: 64
End: 2019-11-04

## 2019-11-04 ENCOUNTER — TELEPHONE (OUTPATIENT)
Dept: ORTHOPEDIC SURGERY | Facility: CLINIC | Age: 64
End: 2019-11-04

## 2019-11-04 NOTE — TELEPHONE ENCOUNTER
Attempted to contact patient to assess his progress and to see how he was doing.  I called both his home number as well as his cell phone number with no answer.  No patient identifier was on either phone number and therefore no voicemail was left.  Patient has a follow-up appointment tomorrow.    11/04/19 at 5:14 PM by Nathaniel Kate MD

## 2019-11-05 ENCOUNTER — ANTICOAGULATION VISIT (OUTPATIENT)
Dept: PHARMACY | Facility: HOSPITAL | Age: 64
End: 2019-11-05

## 2019-11-05 ENCOUNTER — LAB (OUTPATIENT)
Dept: LAB | Facility: HOSPITAL | Age: 64
End: 2019-11-05

## 2019-11-05 ENCOUNTER — OFFICE VISIT (OUTPATIENT)
Dept: ORTHOPEDIC SURGERY | Facility: CLINIC | Age: 64
End: 2019-11-05

## 2019-11-05 ENCOUNTER — TELEPHONE (OUTPATIENT)
Dept: PHARMACY | Facility: HOSPITAL | Age: 64
End: 2019-11-05

## 2019-11-05 VITALS — WEIGHT: 169 LBS | HEIGHT: 66 IN | BODY MASS INDEX: 27.16 KG/M2

## 2019-11-05 DIAGNOSIS — M25.551 RIGHT HIP PAIN: ICD-10-CM

## 2019-11-05 DIAGNOSIS — M16.11 PRIMARY OSTEOARTHRITIS OF RIGHT HIP: ICD-10-CM

## 2019-11-05 DIAGNOSIS — M16.11 PRIMARY OSTEOARTHRITIS OF RIGHT HIP: Primary | ICD-10-CM

## 2019-11-05 DIAGNOSIS — S30.1XXD: Primary | ICD-10-CM

## 2019-11-05 DIAGNOSIS — D62 ACUTE BLOOD LOSS AS CAUSE OF POSTOPERATIVE ANEMIA: ICD-10-CM

## 2019-11-05 DIAGNOSIS — S30.1XXD: ICD-10-CM

## 2019-11-05 LAB
HCT VFR BLD AUTO: 23.6 % (ref 37.5–51)
HGB BLD-MCNC: 7.6 G/DL (ref 13–17.7)

## 2019-11-05 PROCEDURE — 99024 POSTOP FOLLOW-UP VISIT: CPT | Performed by: ORTHOPAEDIC SURGERY

## 2019-11-05 PROCEDURE — 85018 HEMOGLOBIN: CPT

## 2019-11-05 PROCEDURE — 36415 COLL VENOUS BLD VENIPUNCTURE: CPT

## 2019-11-05 PROCEDURE — 85014 HEMATOCRIT: CPT

## 2019-11-05 RX ORDER — OXYCODONE AND ACETAMINOPHEN 7.5; 325 MG/1; MG/1
1 TABLET ORAL EVERY 6 HOURS PRN
Qty: 40 TABLET | Refills: 0 | Status: SHIPPED | OUTPATIENT
Start: 2019-11-05 | End: 2021-11-19

## 2019-11-05 NOTE — PROGRESS NOTES
Sam Infante is a 64 y.o. male is s/p       Chief Complaint   Patient presents with   • Right Hip - Post-op, Follow-up       HISTORY OF PRESENT ILLNESS:  Postop right hip, patient states pain score is at a 4 today,   Patient began having severe flank pain.  Was evaluated and found to have a psoas hematoma.  warfarin was stopped.  Patient decreased activity  Pain is much improved.  Mobilizing better  No fever or chills  No new injury       Allergies   Allergen Reactions   • Statins Other (See Comments)     Muscle pain         Current Outpatient Medications:   •  dextroamphetamine (DEXEDRINE SPANSULE) 15 MG 24 hr capsule, Take 15 mg by mouth 2 (Two) Times a Day., Disp: , Rfl:   •  gabapentin (NEURONTIN) 100 MG capsule, Take 1 capsule by mouth 3 (Three) Times a Day for 15 days., Disp: 45 capsule, Rfl: 0  •  latanoprost (XALATAN) 0.005 % ophthalmic solution, Administer 1 drop to both eyes Every Night., Disp: 2.5 mL, Rfl: 12  •  metoprolol succinate XL (TOPROL-XL) 50 MG 24 hr tablet, Take 50 mg by mouth Daily., Disp: , Rfl:   •  Multiple Vitamins-Minerals (PRESERVISION AREDS PO), Take 2 tablets by mouth Daily., Disp: , Rfl:   •  ondansetron (ZOFRAN) 8 MG tablet, Take  by mouth Every 8 (Eight) Hours As Needed for Nausea or Vomiting., Disp: , Rfl:   •  SUMAtriptan (IMITREX) 20 MG/ACT nasal spray, Every 2 (Two) Hours As Needed., Disp: , Rfl:   •  verapamil PM (VERELAN PM) 360 MG 24 hr capsule, Take 360 mg by mouth daily., Disp: , Rfl:   •  oxyCODONE-acetaminophen (PERCOCET) 7.5-325 MG per tablet, Take 1 tablet by mouth Every 6 (Six) Hours As Needed for Moderate Pain ., Disp: 40 tablet, Rfl: 0    No fevers or chills.  No nausea or vomiting.      PHYSICAL EXAMINATION:       Sam Infante is a 64 y.o. male    Patient is awake and alert, answers questions appropriately and is in no apparent distress.    GAIT:     []  Normal  [x]  Antalgic    Assistive device: []  None  [x]  Walker     []  Crutches  []   "Cane     []  Wheelchair  []  Stretcher    Ortho Exam  Large flank bruising  Good distal pulses and sensation  Good hip motion  No calf tenderness  Incision looks good, no erythema      Xr Hip With Or Without Pelvis 2 - 3 View Right    Result Date: 10/28/2019  Narrative: Ordering Provider:  Brijesh Patterson APRN Ordering Diagnosis/Indication:  Right hip pain, Primary osteoarthritis of right hip Procedure:  XR HIP W OR WO PELVIS 2-3 VIEW RIGHT Exam Date:  10/28/19 COMPARISON:  Not applicable, no relevant images available.     Impression:  AP of the pelvis with 2 views of the right hip reveals a stable total hip arthroplasty with no evidence of hardware failure or other acute radiological abnormality noted. JOANIE Cortés 10/28/19           ASSESSMENT:    Diagnoses and all orders for this visit:    Primary osteoarthritis of right hip  -     Hemoglobin & Hematocrit, Blood; Future    Right hip pain  -     Hemoglobin & Hematocrit, Blood; Future    Psoas hematoma, right, secondary to anticoagulant therapy, subsequent encounter        Patient's Body mass index is 27.28 kg/m². BMI is within normal parameters. No follow-up required..    PLAN    Discussion held regarding further treatment options   We discussed no further DVT prophylaxis and its risks.   However, we also discussed risk of further anticoagulation with further bleeding.   Patient is mobile and therefore risk for DVT is lower.    Patient and wife convey understanding and all agree with no further medication    We also discussed the \"fracture\" on CT scan as being the result of the reaming from surgery as discussed postoperatively.  He was doing great from a weight bearing standpoint until the hematoma appeared.  Therefore, we discussed continue WBAT and mobility as tolerated (slowly).    We discussed anemia and warning signs.  Repeat H/H on Monday unless feels worse and then will repeat sooner.    Slowly progress as tolerated.    Return in about 3 weeks " (around 11/26/2019) for recheck.    Nathaniel Kate MD

## 2019-11-05 NOTE — TELEPHONE ENCOUNTER
PATIENT IS ADVISED HE IS WAITING FOR HIS PRESCRIPTION FOR OXYCODONE 7.5MG TO [facility]ENT TO [facility]. CALL BACK NUMBER [phone]

## 2019-11-05 NOTE — TELEPHONE ENCOUNTER
Spoke with Mr. Infante.  Today is the last day of warfarin therapy after approval from Dr. Kate.  INR supratherapeutic last week.  Ending therapy early.  No further blood work.  Counseled on discarding warfarin.  Patient findings otherwise negative.  All questions answered.

## 2019-11-05 NOTE — PROGRESS NOTES
Spoke with Mr. Infante. Reviewed current lab.  No s/s of bleeding. No new meds. No missed doses.     INR was elevated last week and was put on hold until today.  Post Dr. Kate' appt, spoke with patient.  Dr. Kate approved stopping warfarin early.  No further bloodwork.    All questions answered.  Completing encounter and telephone call.    Amarjit Chavez [facility]  11/05/19  2:09 PM

## 2019-11-07 ENCOUNTER — HOSPITAL ENCOUNTER (OUTPATIENT)
Dept: PHYSICAL THERAPY | Facility: HOSPITAL | Age: 64
Setting detail: THERAPIES SERIES
Discharge: HOME | End: 2019-11-07

## 2019-11-07 DIAGNOSIS — M16.11 PRIMARY OSTEOARTHRITIS OF RIGHT HIP: ICD-10-CM

## 2019-11-07 DIAGNOSIS — M25.551 RIGHT HIP PAIN: Primary | ICD-10-CM

## 2019-11-07 PROCEDURE — 97110 THERAPEUTIC EXERCISES: CPT | Performed by: PHYSICAL THERAPIST

## 2019-11-07 NOTE — THERAPY RE-EVALUATION
"    Outpatient Physical Therapy Ortho Progress Note  [facility]     Patient Name: Sam Infante  : 1955  MRN: [id number]  Today's Date: 2019      Visit Date: 2019  Attendance:   Subjective % Improvement: \"tons\"  Recert Date: 19  MD appointment: 19    Therapy Diagnosis: S/P R BOUCHRA 10/14/19    Visit Dx:    ICD-10-CM ICD-9-CM   1. Right hip pain M25.551 719.45   2. Primary osteoarthritis of right hip M16.11 715.15       Patient Active Problem List   Diagnosis   • Borderline glaucoma, open angle with borderline findings   • Borderline glaucoma   • Left hand pain   • Bilateral carpal tunnel syndrome   • Right hip pain   • Primary osteoarthritis of right hip   • Essential hypertension   • Attention deficit hyperactivity disorder   • Benign prostatic hyperplasia   • Fatigue   • Gastroesophageal reflux disease without esophagitis   • Hyperlipidemia   • Mixed anxiety and depressive disorder   • BERNADETTE (obstructive sleep apnea)   • Routine physical examination   • History of total hip arthroplasty, right        Past Medical History:   Diagnosis Date   • Arthritis    • Elevated cholesterol    • GERD (gastroesophageal reflux disease)     OCCASIONAL   • High blood pressure    • Kidney stone    • Sleep apnea     using c-pap   • Vitreous floater 2013        Past Surgical History:   Procedure Laterality Date   • COLONOSCOPY     • RHINOPLASTY     • SEPTOPLASTY  2013    Nasal surgery procedure (Nasal septoplasty.)   • TOTAL HIP ARTHROPLASTY Right 10/14/2019    Procedure: TOTAL HIP ARTHROPLASTY ANTERIOR;  Surgeon: Nathaniel Kate MD;  Location: [facility];  Service: Orthopedics       PT Ortho     Row Name 19 2776       Subjective Comments    Subjective Comments  Present today with a hematoma of the hip that originally limited gait to pain. Reports using QC for gait for the past couple days. Notes feeling short of breath quick. Trouble sleeping do to lack of comfort. " "Reports past two days or so trying to be more independent with ADLs.   -BB       Precautions and Contraindications    Precautions/Limitations  anterior hip precautions- right  -BB       Subjective Pain    Able to rate subjective pain?  yes  -BB    Pre-Treatment Pain Level  3  -BB    Post-Treatment Pain Level  -- \"not really hurting\"  -BB       Posture/Observations    Posture/Observations Comments  gait with QC with mild trendelenburg on right. Firm scar noted of right anterior hip at incision site. Significant bruising of right hip globally.   -BB       General ROM    GENERAL ROM COMMENTS  Right hip flexion seen in sitting at 90. abduction of about 5 degrees.ext of about 5 degrees seen in gait. ER/IR deferred   -BB       MMT (Manual Muscle Testing)    General MMT Comments  right knee grossly 5/5, right hip flexion: deferred. IR/ER 3+/5 held in neutral in sitting.   -BB       Sensation    Sensation WNL?  WFL  -BB       Balance Skills Training    Balance Comments  able to bear weight through right without pain but not safe to perform true SLS  -BB       Transfers    Comment (Transfers)  Independent wiht UE assist and compensates with a left lateral shift   -BB       Gait/Stairs Assessment/Training    Comment (Gait/Stairs)  gait with QC with midstance antaglics.   -BB      User Key  (r) = Recorded By, (t) = Taken By, (c) = Cosigned By    Initials Name Provider Type    Evelina Melendez PT Physical Therapist                      PT Assessment/Plan     Row Name 11/07/19 4051          PT Assessment    Functional Limitations  Impaired gait;Performance in leisure activities;Performance in sport activities;Performance in work activities  -BB     Impairments  Balance;Endurance;Gait;Impaired flexibility;Muscle strength;Pain;Range of motion;Sensation  -BB     Assessment Comments  Patient presents today with a set back due to a episode of a increased pain and hematoma from increased bleeding. PT spoke to MD and there is no " fracture, there is a hematoma of psoas muscle and to continue PT with pain as guide and WBAT and ROM as tolerated. Patient is noted to have functional strength deficits of glut med, eccentric quad, decreased hip and LE flexibilty that is limiting gait. Patient wished to try and ambulate at home without AD but advised to continue with AD at this time for safety. Will continue to progress as able to improve functional gait.   -BB     Rehab Potential  Good  -BB     Patient/caregiver participated in establishment of treatment plan and goals  Yes  -BB     Patient would benefit from skilled therapy intervention  Yes  -BB        PT Plan    PT Frequency  2x/week  -BB     Predicted Duration of Therapy Intervention (Therapy Eval)  6 weeks  -BB     PT Plan Comments  per Patient MD wants 1x week, will increase to 2x if patient does not progress or declines. continue POC. no manual therapy to psoas for at least one week, look to start US to anterior hip in 1 week, avoid SLR while hematoma and pain is present.   -BB       User Key  (r) = Recorded By, (t) = Taken By, (c) = Cosigned By    Initials Name Provider Type    Evelina Melendez, PT Physical Therapist          Modalities     Row Name 11/07/19 8026             Ice    Patient reports will apply ice at home to involved area  -- ice to go  -BB        User Key  (r) = Recorded By, (t) = Taken By, (c) = Cosigned By    Initials Name Provider Type    Evelina Melendez, PT Physical Therapist        OP Exercises     Row Name 11/07/19 3123             Subjective Comments    Subjective Comments  Present today with a hematoma of the hip that originally limited gait to pain. Reports using QC for gait for the past couple days. Notes feeling short of breath quick. Trouble sleeping do to lack of comfort. Reports past two days or so trying to be more independent with ADLs.   -BB         Subjective Pain    Able to rate subjective pain?  yes  -BB      Pre-Treatment Pain Level  3  -BB       "Post-Treatment Pain Level  -- \"not really hurting\"  -BB         Exercise 1    Exercise Name 1  Recheck   -BB      Additional Comments  HEP reviewed  -BB         Exercise 2    Exercise Name 2  Pro 2 seat 11 level 1  -BB      Time 2  10'  -BB         Exercise 3    Exercise Name 3  Standing TR/HR   -BB      Sets 3  1  -BB      Reps 3  30 each   -BB        User Key  (r) = Recorded By, (t) = Taken By, (c) = Cosigned By    Initials Name Provider Type    Evelina Melendez PT Physical Therapist                       PT OP Goals     Row Name 11/07/19 1356          PT Short Term Goals    STG 1  Pt independent with HEP  -BB     STG 1 Progress  Ongoing;Not Met  -BB     STG 2  Improve R hip flex MMT to 3+/5   -BB     STG 2 Progress  Not Met  -BB     STG 3  Improve R hip flexion AROM to 75 degrees  -BB     STG 3 Progress  Met  -BB     STG 4  Able to ambulate with a SPC with a minimally antalgic gait pattern  -BB     STG 4 Progress  Partially Met  -BB     STG 4 Progress Comments  QC used  -BB     STG 5  Improve R knee ext MMT to 4+/5  -BB     STG 5 Progress  Met  -BB        Long Term Goals    LTG 1  Improve R hip flex/R knee ext MMT to 4+/5   -BB     LTG 1 Progress  Not Met  -BB     LTG 2  Improve R hip flexion AROM to >/=90 degrees  -BB     LTG 2 Progress  Not Met  -BB     LTG 3  Able to ambulate with or without a SPC with a non antalgic gait pattern  -BB     LTG 3 Progress  Not Met  -BB     LTG 4  Improve LEFS score to >/= 50  -BB     LTG 4 Progress  Not Met  -BB        Time Calculation    PT Goal Re-Cert Due Date  11/28/19  -BB       User Key  (r) = Recorded By, (t) = Taken By, (c) = Cosigned By    Initials Name Provider Type    Evelina Melendez PT Physical Therapist               Outcome Measure Options: Lower Extremity Functional Scale (LEFS)  Lower Extremity Functional Index  Any of your usual work, housework or school activities: Quite a bit of difficulty  Your usual hobbies, recreational or sporting activities: Quite a " bit of difficulty  Getting into or out of the bath: Moderate difficulty  Walking between rooms: A little bit of difficulty  Putting on your shoes or socks: Quite a bit of difficulty  Squatting: Moderate difficulty  Lifting an object, like a bag of groceries from the floor: Moderate difficulty  Performing light activities around your home: Moderate difficulty  Performing heavy activities around your home: Quite a bit of difficulty  Getting into or out of a car: Moderate difficulty  Walking 2 blocks: A little bit of difficulty  Walking a mile: Quite a bit of difficulty  Going up or down 10 stairs (about 1 flight of stairs): Moderate difficulty  Standing for 1 hour: A little bit of difficulty  Sitting for 1 hour: A little bit of difficulty  Running on even ground: Quite a bit of difficulty  Running on uneven ground: Quite a bit of difficulty  Making sharp turns while running fast: Quite a bit of difficulty  Hopping: Quite a bit of difficulty  Rolling over in bed: A little bit of difficulty  Total: 36      Time Calculation:   Start Time: 1356  Stop Time: 1440  Time Calculation (min): 44 min  Therapy Charges for Today     Code Description Service Date Service Provider Modifiers Qty    60864313084 HC PT THER PROC EA 15 MIN 11/7/2019 Evelina Leblanc, PT GP 2    79240413482 HC PT THER SUPP EA 15 MIN 11/7/2019 Evelina Leblanc, PT GP 1          PT G-Codes  Outcome Measure Options: Lower Extremity Functional Scale (LEFS)  Total: 36         Evelina Leblanc, PT  11/7/2019

## 2019-11-08 ENCOUNTER — LAB (OUTPATIENT)
Dept: LAB | Facility: HOSPITAL | Age: 64
End: 2019-11-08

## 2019-11-08 DIAGNOSIS — S30.1XXD: ICD-10-CM

## 2019-11-08 DIAGNOSIS — D62 ACUTE BLOOD LOSS AS CAUSE OF POSTOPERATIVE ANEMIA: ICD-10-CM

## 2019-11-08 DIAGNOSIS — Z79.01 LONG TERM (CURRENT) USE OF ANTICOAGULANTS: ICD-10-CM

## 2019-11-08 LAB
HCT VFR BLD AUTO: 25.8 % (ref 37.5–51)
HGB BLD-MCNC: 8.4 G/DL (ref 13–17.7)
INR PPP: 1.06 (ref 0.8–1.2)
PROTHROMBIN TIME: 13.6 SECONDS (ref 11.1–15.3)

## 2019-11-08 PROCEDURE — 85610 PROTHROMBIN TIME: CPT

## 2019-11-08 PROCEDURE — 85014 HEMATOCRIT: CPT

## 2019-11-08 PROCEDURE — 36415 COLL VENOUS BLD VENIPUNCTURE: CPT

## 2019-11-08 PROCEDURE — 85018 HEMOGLOBIN: CPT

## 2019-11-11 ENCOUNTER — LAB (OUTPATIENT)
Dept: LAB | Facility: HOSPITAL | Age: 64
End: 2019-11-11

## 2019-11-11 DIAGNOSIS — D62 ACUTE BLOOD LOSS AS CAUSE OF POSTOPERATIVE ANEMIA: Primary | ICD-10-CM

## 2019-11-11 DIAGNOSIS — M16.11 PRIMARY OSTEOARTHRITIS OF RIGHT HIP: ICD-10-CM

## 2019-11-11 DIAGNOSIS — S30.1XXD: ICD-10-CM

## 2019-11-11 DIAGNOSIS — D62 ACUTE BLOOD LOSS AS CAUSE OF POSTOPERATIVE ANEMIA: ICD-10-CM

## 2019-11-11 LAB
HCT VFR BLD AUTO: 28.5 % (ref 37.5–51)
HGB BLD-MCNC: 9.4 G/DL (ref 13–17.7)

## 2019-11-11 PROCEDURE — 85014 HEMATOCRIT: CPT

## 2019-11-11 PROCEDURE — 36415 COLL VENOUS BLD VENIPUNCTURE: CPT

## 2019-11-11 PROCEDURE — 85018 HEMOGLOBIN: CPT

## 2019-11-13 ENCOUNTER — TELEPHONE (OUTPATIENT)
Dept: ORTHOPEDIC SURGERY | Facility: CLINIC | Age: 64
End: 2019-11-13

## 2019-11-13 ENCOUNTER — HOSPITAL ENCOUNTER (OUTPATIENT)
Dept: PHYSICAL THERAPY | Facility: HOSPITAL | Age: 64
Setting detail: THERAPIES SERIES
Discharge: HOME | End: 2019-11-13

## 2019-11-13 DIAGNOSIS — M16.11 PRIMARY OSTEOARTHRITIS OF RIGHT HIP: ICD-10-CM

## 2019-11-13 DIAGNOSIS — M25.551 RIGHT HIP PAIN: Primary | ICD-10-CM

## 2019-11-13 PROCEDURE — 97110 THERAPEUTIC EXERCISES: CPT

## 2019-11-13 NOTE — THERAPY TREATMENT NOTE
"    Outpatient Physical Therapy Ortho Treatment Note  [facility]     Patient Name: Sam Infante  : 1955  MRN: [id number]  Today's Date: 2019      Visit Date: 2019  Subjective Improvement: \"some\"  MD visit:   Visit Number:8/10  Total Approved:87  Recert Date: 2019  Visit Dx:    ICD-10-CM ICD-9-CM   1. Right hip pain M25.551 719.45   2. Primary osteoarthritis of right hip M16.11 715.15       Patient Active Problem List   Diagnosis   • Borderline glaucoma, open angle with borderline findings   • Borderline glaucoma   • Left hand pain   • Bilateral carpal tunnel syndrome   • Right hip pain   • Primary osteoarthritis of right hip   • Essential hypertension   • Attention deficit hyperactivity disorder   • Benign prostatic hyperplasia   • Fatigue   • Gastroesophageal reflux disease without esophagitis   • Hyperlipidemia   • Mixed anxiety and depressive disorder   • BERNADETTE (obstructive sleep apnea)   • Routine physical examination   • History of total hip arthroplasty, right        Past Medical History:   Diagnosis Date   • Arthritis    • Elevated cholesterol    • GERD (gastroesophageal reflux disease)     OCCASIONAL   • High blood pressure    • Kidney stone    • Sleep apnea     using c-pap   • Vitreous floater 2013        Past Surgical History:   Procedure Laterality Date   • COLONOSCOPY     • RHINOPLASTY     • SEPTOPLASTY  2013    Nasal surgery procedure (Nasal septoplasty.)   • TOTAL HIP ARTHROPLASTY Right 10/14/2019    Procedure: TOTAL HIP ARTHROPLASTY ANTERIOR;  Surgeon: Nathaniel Kate MD;  Location: [facility];  Service: Orthopedics       PT Ortho     Row Name 19 1300       Precautions and Contraindications    Precautions/Limitations  anterior hip precautions- right  -TL       Subjective Pain    Able to rate subjective pain?  yes  -TL    Pre-Treatment Pain Level  2  -TL    Post-Treatment Pain Level  3 hurting just alittle more  -TL      User Key  (r) = " Recorded By, (t) = Taken By, (c) = Cosigned By    Initials Name Provider Type    Maria Guadalupe Angeles PTA Physical Therapy Assistant                      PT Assessment/Plan     Row Name 11/13/19 1400          PT Assessment    Assessment Comments  pt tolerated JOSE ENRIQUE to stretch hip flexos and SL clams. PtA added these two ex for HEP. Pt received pictures and stefani instruction. Pt verbalized and demonstrated I with HEP. No new goals met and working on strengthening and stretching. Pt also worked on some balance to work toward getting off assisted device.  -TL        PT Plan    PT Frequency  2x/week  -TL     Predicted Duration of Therapy Intervention (Therapy Eval)  6 weeks  -TL     PT Plan Comments  look into US next visit. See how pt tolerated JOSE ENRIQUE next visit.  -TL       User Key  (r) = Recorded By, (t) = Taken By, (c) = Cosigned By    Initials Name Provider Type    Maria Guadalupe Angeles PTA Physical Therapy Assistant            OP Exercises     Row Name 11/13/19 1524 11/13/19 1300          Subjective Pain    Able to rate subjective pain?  --  yes  -TL     Pre-Treatment Pain Level  --  2  -TL     Post-Treatment Pain Level  --  3 hurting just alittle more  -TL        Total Minutes    81474 - PT Therapeutic Exercise Minutes  47  -TL  --        Exercise 1    Exercise Name 1  --  pro ll legs strengthening  -TL     Cueing 1  --  -- level 3  -TL     Time 1  --  10 mins  -TL        Exercise 2    Exercise Name 2  --  incline S  -TL     Sets 2  --  2  -TL     Time 2  --  30 sec hold  -TL        Exercise 3    Exercise Name 3  --  st lunge S  -TL        Exercise 4    Exercise Name 4  --  St ham S  -TL     Sets 4  --  2  -TL     Time 4  --  30 sec hold  -TL        Exercise 5    Exercise Name 5  --  St CR/TR on airex  -TL     Sets 5  --  2  -TL     Reps 5  --  10  -TL        Exercise 6    Exercise Name 6  --  bridging added hip add with ball squeezes  -TL     Sets 6  --  2  -TL     Reps 6  --  10  -TL     Time 6  --  5 sec hold  -TL         Exercise 7    Exercise Name 7  --  SL clams  -TL     Sets 7  --  2  -TL     Reps 7  --  10  -TL     Time 7  --  5 sec hold  -TL        Exercise 8    Exercise Name 8  --  JOSE ENRIQUE  -TL     Time 8  --  2min  -TL       User Key  (r) = Recorded By, (t) = Taken By, (c) = Cosigned By    Initials Name Provider Type    TL Maria Guadalupe Spivey PTA Physical Therapy Assistant                       PT OP Goals     Row Name 11/13/19 1400          PT Short Term Goals    STG 1  Pt independent with HEP  -TL     STG 1 Progress  Ongoing;Not Met  -TL     STG 2  Improve R hip flex MMT to 3+/5   -TL     STG 2 Progress  Not Met  -TL     STG 3  Improve R hip flexion AROM to 75 degrees  -TL     STG 3 Progress  Met  -TL     STG 4  Able to ambulate with a SPC with a minimally antalgic gait pattern  -TL     STG 4 Progress  Partially Met  -TL     STG 5  Improve R knee ext MMT to 4+/5  -TL     STG 5 Progress  Met  -TL        Long Term Goals    LTG 1  Improve R hip flex/R knee ext MMT to 4+/5   -TL     LTG 1 Progress  Not Met  -TL     LTG 2  Improve R hip flexion AROM to >/=90 degrees  -TL     LTG 2 Progress  Not Met  -TL     LTG 3  Able to ambulate with or without a SPC with a non antalgic gait pattern  -TL     LTG 3 Progress  Not Met  -TL     LTG 4  Improve LEFS score to >/= 50  -TL     LTG 4 Progress  Not Met  -TL        Time Calculation    PT Goal Re-Cert Due Date  11/28/19  -TL       User Key  (r) = Recorded By, (t) = Taken By, (c) = Cosigned By    Initials Name Provider Type    TL Maria Guadalupe Spivey PTA Physical Therapy Assistant                         Time Calculation:   Start Time: 1345  Stop Time: 1432  Time Calculation (min): 47 min  Total Timed Code Minutes- PT: 47 minute(s)  Therapy Charges for Today     Code Description Service Date Service Provider Modifiers Qty    78472784992 HC PT THER PROC EA 15 MIN 11/13/2019 Maria Guadalupe Spivey PTA GP 1                    Maria Guadalupe Spivey PTA  11/13/2019

## 2019-11-13 NOTE — TELEPHONE ENCOUNTER
His hemoglobin is up almost a whole point.  Getting better.  Tell him to keep doing the same things.  MODE

## 2019-11-20 ENCOUNTER — HOSPITAL ENCOUNTER (OUTPATIENT)
Dept: PHYSICAL THERAPY | Facility: HOSPITAL | Age: 64
Setting detail: THERAPIES SERIES
Discharge: HOME | End: 2019-11-20

## 2019-11-20 DIAGNOSIS — M16.11 PRIMARY OSTEOARTHRITIS OF RIGHT HIP: ICD-10-CM

## 2019-11-20 DIAGNOSIS — M25.551 RIGHT HIP PAIN: Primary | ICD-10-CM

## 2019-11-20 PROCEDURE — 97110 THERAPEUTIC EXERCISES: CPT

## 2019-11-20 NOTE — THERAPY TREATMENT NOTE
Outpatient Physical Therapy Ortho Treatment Note  [facility]     Patient Name: Sam Infante  : 1955  MRN: [id number]  Today's Date: 2019      Visit Date: 2019     Subjective Improvement: 50%  Attendance:   (87/yr)  Next MD Visit : 19 or 19 (awaiting MD to call back on this )  Recert Date:  19      Therapy Diagnosis:  S/P R BOUCHRA 10/14/19        Visit Dx:    ICD-10-CM ICD-9-CM   1. Right hip pain M25.551 719.45   2. Primary osteoarthritis of right hip M16.11 715.15       Patient Active Problem List   Diagnosis   • Borderline glaucoma, open angle with borderline findings   • Borderline glaucoma   • Left hand pain   • Bilateral carpal tunnel syndrome   • Right hip pain   • Primary osteoarthritis of right hip   • Essential hypertension   • Attention deficit hyperactivity disorder   • Benign prostatic hyperplasia   • Fatigue   • Gastroesophageal reflux disease without esophagitis   • Hyperlipidemia   • Mixed anxiety and depressive disorder   • BERNADETTE (obstructive sleep apnea)   • Routine physical examination   • History of total hip arthroplasty, right        Past Medical History:   Diagnosis Date   • Arthritis    • Elevated cholesterol    • GERD (gastroesophageal reflux disease)     OCCASIONAL   • High blood pressure    • Kidney stone    • Sleep apnea     using c-pap   • Vitreous floater 2013        Past Surgical History:   Procedure Laterality Date   • COLONOSCOPY     • RHINOPLASTY     • SEPTOPLASTY  2013    Nasal surgery procedure (Nasal septoplasty.)   • TOTAL HIP ARTHROPLASTY Right 10/14/2019    Procedure: TOTAL HIP ARTHROPLASTY ANTERIOR;  Surgeon: Nathaniel Kate MD;  Location: [facility];  Service: Orthopedics       PT Ortho     Row Name 19 1400       Precautions and Contraindications    Precautions/Limitations  anterior hip precautions- right  -KH       Posture/Observations    Posture/Observations Comments  gait with QC into clinic; no  "cane used thoughout treatment (minimal to no antalgia noted); scar well healed, no brusing note around R hip just in lower R abdomen;  -      User Key  (r) = Recorded By, (t) = Taken By, (c) = Cosigned By    Initials Name Provider Type    Kelley Jim PTA Physical Therapy Assistant                      PT Assessment/Plan     Row Name 11/20/19 1400          PT Assessment    Assessment Comments  much improved gait and strength in the right hip; Brusing much better, minial tenderness in posterior hip; educated pt on scar massage at home and to progress gait without cane.   -        PT Plan    PT Frequency  2x/week  -DEE     Predicted Duration of Therapy Intervention (Therapy Eval)  6 weeks  -     PT Plan Comments  Continue to progress as pt allows; One 1x/week per MD then possible increase to 2x if no improvement. Will schedule recheck for the week after thanksgiving.   -       User Key  (r) = Recorded By, (t) = Taken By, (c) = Cosigned By    Initials Name Provider Type    Kelley Jim PTA Physical Therapy Assistant            OP Exercises     Row Name 11/20/19 1400             Subjective Comments    Subjective Comments  Pt reports that he has been walking and working on staris at home;  brusining improving along with hematoma and pain;  been walking at home without cane;   -         Subjective Pain    Able to rate subjective pain?  yes  -      Pre-Treatment Pain Level  2  -      Post-Treatment Pain Level  2  -         Exercise 1    Exercise Name 1  pro ll LE strength   -      Cueing 1  Verbal  -KH      Time 1  10'  -KH      Additional Comments  level 3; seat 10  -KH         Exercise 2    Exercise Name 2  standing hamstring stretch  -      Cueing 2  Verbal  -KH      Sets 2  3  -KH      Time 2  30\"  -KH         Exercise 3    Exercise Name 3  standing lunge hip flexor stretch  -      Cueing 3  Verbal  -KH      Sets 3  3  -KH      Time 3  30\"  -KH         Exercise 4    Exercise Name 4  step ups " fwd/lat  -KH      Cueing 4  Verbal  -KH      Sets 4  2  -KH      Reps 4  10  -KH      Additional Comments  4 inch   -KH         Exercise 5    Exercise Name 5  ramp up/down fwd  -KH      Cueing 5  Verbal  -KH      Reps 5  5 trips   -KH         Exercise 6    Exercise Name 6  ramp up bkd/down fwd  -KH      Cueing 6  Verbal  -KH      Reps 6  5 trips  -KH         Exercise 7    Exercise Name 7  Bridges  -KH      Cueing 7  Verbal  -KH      Sets 7  3  -KH      Reps 7  10  -KH         Exercise 8    Exercise Name 8  JOSE ENRIQUE  -KH      Time 8  3'  -KH        User Key  (r) = Recorded By, (t) = Taken By, (c) = Cosigned By    Initials Name Provider Type    Kelley Jim PTA Physical Therapy Assistant                       PT OP Goals     Row Name 11/20/19 1400          PT Short Term Goals    STG 1  Pt independent with HEP  -KH     STG 1 Progress  Ongoing;Not Met  -KH     STG 2  Improve R hip flex MMT to 3+/5   -KH     STG 2 Progress  Not Met  -KH     STG 3  Improve R hip flexion AROM to 75 degrees  -KH     STG 3 Progress  Met  -KH     STG 4  Able to ambulate with a SPC with a minimally antalgic gait pattern  -KH     STG 4 Progress  Met  -KH     STG 5  Improve R knee ext MMT to 4+/5  -KH     STG 5 Progress  Met  -KH        Long Term Goals    LTG 1  Improve R hip flex/R knee ext MMT to 4+/5   -KH     LTG 1 Progress  Not Met  -KH     LTG 2  Improve R hip flexion AROM to >/=90 degrees  -KH     LTG 2 Progress  Not Met  -KH     LTG 3  Able to ambulate with or without a SPC with a non antalgic gait pattern  -KH     LTG 3 Progress  Not Met  -KH     LTG 4  Improve LEFS score to >/= 50  -KH     LTG 4 Progress  Not Met  -KH        Time Calculation    PT Goal Re-Cert Due Date  11/28/19  -       User Key  (r) = Recorded By, (t) = Taken By, (c) = Cosigned By    Initials Name Provider Type    Kelley Jim PTA Physical Therapy Assistant                         Time Calculation:   Start Time: 1347  Stop Time: 1425  Time Calculation (min): 38  min  Total Timed Code Minutes- PT: 38 minute(s)  Therapy Charges for Today     Code Description Service Date Service Provider Modifiers Qty    08529456388 HC PT THER PROC EA 15 MIN 11/20/2019 Kelley Martines, PTA GP 3                    Kelley Martines PTA  11/20/2019

## 2019-11-25 ENCOUNTER — DOCUMENTATION (OUTPATIENT)
Dept: ORTHOPEDIC SURGERY | Facility: CLINIC | Age: 64
End: 2019-11-25

## 2019-11-26 ENCOUNTER — HOSPITAL ENCOUNTER (OUTPATIENT)
Dept: PHYSICAL THERAPY | Facility: HOSPITAL | Age: 64
Setting detail: THERAPIES SERIES
Discharge: HOME | End: 2019-11-26

## 2019-11-26 DIAGNOSIS — M16.11 PRIMARY OSTEOARTHRITIS OF RIGHT HIP: ICD-10-CM

## 2019-11-26 DIAGNOSIS — M25.551 RIGHT HIP PAIN: Primary | ICD-10-CM

## 2019-11-26 PROCEDURE — 97110 THERAPEUTIC EXERCISES: CPT

## 2019-11-26 NOTE — THERAPY TREATMENT NOTE
Outpatient Physical Therapy Ortho Treatment Note  [facility]     Patient Name: Sam Infante  : 1955  MRN: [id number]  Today's Date: 2019      Visit Date: 2019     Subjective Improvement: 50%  Attendance:   (87/yr)  Next MD Visit : 19   Recert Date:  19        Therapy Diagnosis:  S/P R BOUCHRA 10/14/19    Visit Dx:    ICD-10-CM ICD-9-CM   1. Right hip pain M25.551 719.45   2. Primary osteoarthritis of right hip M16.11 715.15       Patient Active Problem List   Diagnosis   • Borderline glaucoma, open angle with borderline findings   • Borderline glaucoma   • Left hand pain   • Bilateral carpal tunnel syndrome   • Right hip pain   • Primary osteoarthritis of right hip   • Essential hypertension   • Attention deficit hyperactivity disorder   • Benign prostatic hyperplasia   • Fatigue   • Gastroesophageal reflux disease without esophagitis   • Hyperlipidemia   • Mixed anxiety and depressive disorder   • BERNADETTE (obstructive sleep apnea)   • Routine physical examination   • History of total hip arthroplasty, right        Past Medical History:   Diagnosis Date   • Arthritis    • Elevated cholesterol    • GERD (gastroesophageal reflux disease)     OCCASIONAL   • High blood pressure    • Kidney stone    • Sleep apnea     using c-pap   • Vitreous floater 2013        Past Surgical History:   Procedure Laterality Date   • COLONOSCOPY     • RHINOPLASTY     • SEPTOPLASTY  2013    Nasal surgery procedure (Nasal septoplasty.)   • TOTAL HIP ARTHROPLASTY Right 10/14/2019    Procedure: TOTAL HIP ARTHROPLASTY ANTERIOR;  Surgeon: Nathaniel Kate MD;  Location: [facility];  Service: Orthopedics       PT Ortho     Row Name 19 1500       Precautions and Contraindications    Precautions/Limitations  anterior hip precautions- right  -       Posture/Observations    Posture/Observations Comments  gait non antalgic; no cane  -KH      User Key  (r) = Recorded By, (t) = Taken  "By, (c) = Cosigned By    Initials Name Provider Type    Kelley Jim PTA Physical Therapy Assistant                      PT Assessment/Plan     Row Name 11/26/19 1500          PT Assessment    Assessment Comments  some discomfort after the leg press secondary to sorenss but no increased pain. SL balance and gait improving each visit.   -        PT Plan    PT Frequency  2x/week  -DEE     Predicted Duration of Therapy Intervention (Therapy Eval)  6 weeks  -     PT Plan Comments  Continue to incresae strength as able.,Recheck next week with PT; Continue as advised.   -       User Key  (r) = Recorded By, (t) = Taken By, (c) = Cosigned By    Initials Name Provider Type    DEE Kelley Martines PTA Physical Therapy Assistant            OP Exercises     Row Name 11/26/19 1500             Subjective Comments    Subjective Comments  Pt reports that he worked in the yard all day yesterday and had a little increased pain but states that he took a pain pill and he flet looser this morning; Otherwise doing well; brusing and scar continue to improve.   -         Subjective Pain    Able to rate subjective pain?  yes  -KH      Pre-Treatment Pain Level  1  -KH      Post-Treatment Pain Level  2  -KH         Exercise 1    Exercise Name 1  pro ll LE strength  -KH      Cueing 1  Verbal  -KH      Time 1  10'  -KH      Additional Comments  level 3; seat 9  -KH         Exercise 2    Exercise Name 2  standing hamstring stretch  -KH      Cueing 2  Verbal  -KH      Sets 2  3  -KH      Time 2  30\"  -KH         Exercise 3    Exercise Name 3  standing lunge hip flexor stretch  -KH      Cueing 3  Verbal  -KH      Sets 3  3  -KH      Time 3  30\"  -KH         Exercise 4    Exercise Name 4  step ups fwd/lat  -KH      Cueing 4  Verbal  -KH      Sets 4  2  -KH      Reps 4  10  -KH      Additional Comments  6\" step   -KH         Exercise 5    Exercise Name 5  SLS no UE  -KH      Cueing 5  Verbal  -KH      Sets 5  5  -KH      Time 5  15\" hold  -KH   "       Exercise 6    Exercise Name 6  Ramp fwd up/dwn  -KH      Cueing 6  Verbal  -KH      Reps 6  5  -KH         Exercise 7    Exercise Name 7  Ramp up bkd/dwn fwd  -KH      Cueing 7  Verbal  -KH      Time 7  5 trips  -KH         Exercise 8    Exercise Name 8  Ramp lateral R/L leade  -KH      Cueing 8  Verbal  -KH      Reps 8  3 trips R/L  -KH         Exercise 9    Exercise Name 9  cybex leg press  -KH      Cueing 9  Verbal  -KH      Sets 9  2  -KH      Reps 9  10  -KH      Additional Comments  80#  -KH         Exercise 10    Exercise Name 10  cybex hip abd/add  -KH      Cueing 10  Verbal  -KH      Sets 10  2  -KH      Reps 10  10  -KH      Additional Comments  30#  -KH        User Key  (r) = Recorded By, (t) = Taken By, (c) = Cosigned By    Initials Name Provider Type    Kelley Jim, PTA Physical Therapy Assistant                       PT OP Goals     Row Name 11/26/19 1500          PT Short Term Goals    STG 1  Pt independent with HEP  -KH     STG 1 Progress  Ongoing;Not Met  -KH     STG 2  Improve R hip flex MMT to 3+/5   -KH     STG 2 Progress  Not Met  -KH     STG 3  Improve R hip flexion AROM to 75 degrees  -KH     STG 3 Progress  Met  -KH     STG 4  Able to ambulate with a SPC with a minimally antalgic gait pattern  -KH     STG 4 Progress  Met  -KH     STG 5  Improve R knee ext MMT to 4+/5  -KH     STG 5 Progress  Met  -KH        Long Term Goals    LTG 1  Improve R hip flex/R knee ext MMT to 4+/5   -KH     LTG 1 Progress  Not Met  -KH     LTG 2  Improve R hip flexion AROM to >/=90 degrees  -KH     LTG 2 Progress  Not Met  -KH     LTG 3  Able to ambulate with or without a SPC with a non antalgic gait pattern  -     LTG 3 Progress  Not Met  -KH     LTG 4  Improve LEFS score to >/= 50  -KH     LTG 4 Progress  Not Met  -KH        Time Calculation    PT Goal Re-Cert Due Date  11/28/19  -       User Key  (r) = Recorded By, (t) = Taken By, (c) = Cosigned By    Initials Name Provider Type    Kelley Jim,  PTA Physical Therapy Assistant                         Time Calculation:   Start Time: 1513  Stop Time: 1553  Time Calculation (min): 40 min  Total Timed Code Minutes- PT: 40 minute(s)  Therapy Charges for Today     Code Description Service Date Service Provider Modifiers Qty    95496275688 HC PT THER PROC EA 15 MIN 11/26/2019 Kelley Martines PTA GP 3                    Kelley Martines PTA  11/26/2019

## 2019-12-05 ENCOUNTER — HOSPITAL ENCOUNTER (OUTPATIENT)
Dept: PHYSICAL THERAPY | Facility: HOSPITAL | Age: 64
Setting detail: THERAPIES SERIES
Discharge: HOME | End: 2019-12-05

## 2019-12-05 ENCOUNTER — OFFICE VISIT (OUTPATIENT)
Dept: ORTHOPEDIC SURGERY | Facility: CLINIC | Age: 64
End: 2019-12-05

## 2019-12-05 VITALS — WEIGHT: 191 LBS | BODY MASS INDEX: 30.7 KG/M2 | HEIGHT: 66 IN

## 2019-12-05 DIAGNOSIS — M25.551 RIGHT HIP PAIN: ICD-10-CM

## 2019-12-05 DIAGNOSIS — M16.11 PRIMARY OSTEOARTHRITIS OF RIGHT HIP: Primary | ICD-10-CM

## 2019-12-05 DIAGNOSIS — M16.11 PRIMARY OSTEOARTHRITIS OF RIGHT HIP: ICD-10-CM

## 2019-12-05 DIAGNOSIS — Z96.641 STATUS POST TOTAL REPLACEMENT OF RIGHT HIP: ICD-10-CM

## 2019-12-05 DIAGNOSIS — M25.551 RIGHT HIP PAIN: Primary | ICD-10-CM

## 2019-12-05 PROCEDURE — 99024 POSTOP FOLLOW-UP VISIT: CPT | Performed by: ORTHOPAEDIC SURGERY

## 2019-12-05 PROCEDURE — 97110 THERAPEUTIC EXERCISES: CPT | Performed by: PHYSICAL THERAPIST

## 2019-12-05 PROCEDURE — 97035 APP MDLTY 1+ULTRASOUND EA 15: CPT | Performed by: PHYSICAL THERAPIST

## 2019-12-05 NOTE — THERAPY PROGRESS REPORT/RE-CERT
Outpatient Physical Therapy Ortho Progress Note  [facility]     Patient Name: Sam Infante  : 1955  MRN: [id number]  Today's Date: 2019      Visit Date: 2019  Attendance: 13/15  Subjective % Improvement: 70-80%  Recert Date: 19  MD appointment: TBD    Therapy Diagnosis: S/P R BOUCHRA 10/14/19  Visit Dx:    ICD-10-CM ICD-9-CM   1. Right hip pain M25.551 719.45   2. Primary osteoarthritis of right hip M16.11 715.15       Patient Active Problem List   Diagnosis   • Borderline glaucoma, open angle with borderline findings   • Borderline glaucoma   • Left hand pain   • Bilateral carpal tunnel syndrome   • Right hip pain   • Primary osteoarthritis of right hip   • Essential hypertension   • Attention deficit hyperactivity disorder   • Benign prostatic hyperplasia   • Fatigue   • Gastroesophageal reflux disease without esophagitis   • Hyperlipidemia   • Mixed anxiety and depressive disorder   • BERNADETTE (obstructive sleep apnea)   • Routine physical examination   • History of total hip arthroplasty, right        Past Medical History:   Diagnosis Date   • Arthritis    • Elevated cholesterol    • GERD (gastroesophageal reflux disease)     OCCASIONAL   • High blood pressure    • Kidney stone    • Sleep apnea     using c-pap   • Vitreous floater 2013        Past Surgical History:   Procedure Laterality Date   • COLONOSCOPY     • RHINOPLASTY     • SEPTOPLASTY  2013    Nasal surgery procedure (Nasal septoplasty.)   • TOTAL HIP ARTHROPLASTY Right 10/14/2019    Procedure: TOTAL HIP ARTHROPLASTY ANTERIOR;  Surgeon: Nathaniel Kate MD;  Location: [facility];  Service: Orthopedics       PT Ortho     Row Name 19 0514       Subjective Comments    Subjective Comments  Reports feeling a lot better, reports pushing mowing yard. Notes first few steps are uncomfortable but improve with more. No PMH or medication changes  -BB       Precautions and Contraindications     "Precautions/Limitations  anterior hip precautions- right  -BB       Subjective Pain    Able to rate subjective pain?  yes  -BB    Pre-Treatment Pain Level  -- \"1.5\"  -BB    Post-Treatment Pain Level  -- feels good   -BB       Posture/Observations    Posture/Observations Comments  no antalgics. Mild antalgic with stair decent. Mild bruising of crest of illium. Slight decreased scar mobility noted proximal vs distal.   -BB       General ROM    GENERAL ROM COMMENTS  standing hip flexion: 45, abd 22   -BB       MMT (Manual Muscle Testing)    General MMT Comments  right hip and knee grossly 4/5   -BB       Sensation    Sensation WNL?  WNL  -BB       Balance Skills Training    Balance Comments  SLS on right 5\" with significant ankle strategy  -BB       Transfers    Comment (Transfers)  independent in all   -BB       Gait/Stairs Assessment/Training    Comment (Gait/Stairs)  no compensations.   -BB      User Key  (r) = Recorded By, (t) = Taken By, (c) = Cosigned By    Initials Name Provider Type    Evelina Melendez PT Physical Therapist                      PT Assessment/Plan     Row Name 12/05/19 1604          PT Assessment    Functional Limitations  Impaired gait;Performance in leisure activities;Performance in sport activities;Performance in work activities  -BB     Impairments  Balance;Endurance;Gait;Impaired flexibility;Muscle strength;Pain;Range of motion;Sensation  -BB     Assessment Comments  Patient presents with most complaints of anterior thigh/upper hip with slight decreaed scar mobility. Mild gluteal tenderness to palpation, no quad or lateral hip symptoms noted. US performed to aide in healing and tissue mobility/sensitivity with patient reporting feeling like it aided the first few step pains. Patient encouraged to continue HEP for glut med strengthening. Will continue to progress ROM and strength functionally.   -BB     Rehab Potential  Good  -BB     Patient/caregiver participated in establishment of " "treatment plan and goals  Yes  -BB     Patient would benefit from skilled therapy intervention  Yes  -BB        PT Plan    PT Frequency  1x/week;2x/week  -BB     Predicted Duration of Therapy Intervention (Therapy Eval)  3-4 weeks   -BB     PT Plan Comments  continue POC. Progress strength, ROM, US to anterior scar/hip   -BB       User Key  (r) = Recorded By, (t) = Taken By, (c) = Cosigned By    Initials Name Provider Type    Evelina Melendez, PT Physical Therapist            OP Exercises     Row Name 12/05/19 1604             Subjective Comments    Subjective Comments  Reports feeling a lot better, reports pushing mowing yard. Notes first few steps are uncomfortable but improve with more. No PMH or medication changes  -BB         Subjective Pain    Able to rate subjective pain?  yes  -BB      Pre-Treatment Pain Level  -- \"1.5\"  -BB      Post-Treatment Pain Level  -- feels good   -BB         Exercise 1    Exercise Name 1  pro 2 ROM   -BB      Time 1  10'  -BB      Additional Comments  level 1  -BB         Exercise 2    Exercise Name 2  standing incline stertch   -BB      Sets 2  3  -BB      Time 2  30\"  -BB         Exercise 3    Exercise Name 3  recheck   -BB         Exercise 4    Exercise Name 4  scar massage education   -BB         Exercise 5    Exercise Name 5  US to right anterior hip/scar   -BB      Time 5  8'  -BB         Exercise 6    Exercise Name 6  gait analysis on stairs   -BB      Additional Comments  slight trendelenburg noted   -BB         Exercise 7    Exercise Name 7  clamshells   -BB      Sets 7  3  -BB      Time 7  10  -BB         Exercise 8    Exercise Name 8  SL hip abd   -BB      Sets 8  2  -BB      Reps 8  10  -BB        User Key  (r) = Recorded By, (t) = Taken By, (c) = Cosigned By    Initials Name Provider Type    Evelina Melendez PT Physical Therapist                       PT OP Goals     Row Name 12/05/19 1609          PT Short Term Goals    STG Date to Achieve  -- all STGs met, defer  " -BB     STG 1  Pt independent with HEP  -BB     STG 1 Progress  Met  -BB     STG 2  Improve R hip flex MMT to 3+/5   -BB     STG 2 Progress  Met  -BB     STG 3  Improve R hip flexion AROM to 75 degrees  -BB     STG 3 Progress  Met  -BB     STG 3 Progress Comments  in sitting   -BB     STG 4  Able to ambulate with a SPC with a minimally antalgic gait pattern  -BB     STG 4 Progress  Met  -BB     STG 5  Improve R knee ext MMT to 4+/5  -BB     STG 5 Progress  Met  -BB        Long Term Goals    LTG 1  Improve R hip flex/R knee ext MMT to 4+/5   -BB     LTG 1 Progress  Not Met  -BB     LTG 2  Improve R hip flexion AROM to >/=90 degrees  -BB     LTG 2 Progress  Not Met  -BB     LTG 2 Progress Comments  in standing 50 degrees  -BB     LTG 3  Able to ambulate with or without a SPC with a non antalgic gait pattern  -BB     LTG 3 Progress  Partially Met  -BB     LTG 3 Progress Comments  antalgic first 4-5 steps   -BB     LTG 4  Improve LEFS score to >/= 50  -BB     LTG 4 Progress  Not Met  -BB        Time Calculation    PT Goal Re-Cert Due Date  12/26/19  -BB       User Key  (r) = Recorded By, (t) = Taken By, (c) = Cosigned By    Initials Name Provider Type    Evelina Melendez PT Physical Therapist          Therapy Education  Education Details: scar massage, clamshells  Given: HEP  Program: New  How Provided: Verbal  Provided to: Patient  Level of Understanding: Teach back education performed    Outcome Measure Options: Lower Extremity Functional Scale (LEFS)  Lower Extremity Functional Index  Any of your usual work, housework or school activities: A little bit of difficulty  Your usual hobbies, recreational or sporting activities: Moderate difficulty  Getting into or out of the bath: A little bit of difficulty  Walking between rooms: No difficulty  Putting on your shoes or socks: A little bit of difficulty  Squatting: No difficulty  Lifting an object, like a bag of groceries from the floor: A little bit of  difficulty  Performing light activities around your home: No difficulty  Performing heavy activities around your home: Moderate difficulty  Getting into or out of a car: A little bit of difficulty  Walking 2 blocks: No difficulty  Walking a mile: Moderate difficulty  Going up or down 10 stairs (about 1 flight of stairs): No difficulty  Standing for 1 hour: Moderate difficulty  Sitting for 1 hour: No difficulty  Running on even ground: A little bit of difficulty  Running on uneven ground: Moderate difficulty  Making sharp turns while running fast: A little bit of difficulty  Hopping: Moderate difficulty  Rolling over in bed: A little bit of difficulty  Total: 60      Time Calculation:   Start Time: 1604  Stop Time: 1650  Time Calculation (min): 46 min  Therapy Charges for Today     Code Description Service Date Service Provider Modifiers Qty    10451636656 HC PT ULTRASOUND EA 15 MIN 12/5/2019 Evelina Leblanc, PT GP 1    72643602995 HC PT THER PROC EA 15 MIN 12/5/2019 Evelina Leblanc, PT GP 2          PT G-Codes  Outcome Measure Options: Lower Extremity Functional Scale (LEFS)  Total: 60         Evelina Leblanc, PT  12/5/2019

## 2019-12-05 NOTE — PROGRESS NOTES
"Sam Infante : 1955 MRN: [id number] DATE: 2019    Chief Complaint:  Chief Complaint   Patient presents with   • Right Hip - Follow-up     Date of surgery:       10/14/19 (52d) Nathaniel Kate MD    TOTAL HIP ARTHROPLASTY ANTERIOR - Right     No new complaints.  He states that the pain in his hip is much better.  His flank pain is much improved as well.  No fevers or chills.  No numbness or tingling.    SUBJECTIVE:  Patient returns today for 8 week follow up of right total hip replacement. Patient reports doing well with no unusual complaints. Appears to be progressing appropriately.  Using:  [x]  No assistive device  []  Cane  []  walker    OBJECTIVE:   Vitals:    19 0853   Weight: 86.6 kg (191 lb)   Height: 167.6 cm (66\")       Exam right hip:   The incision is healed. No sign of infection.   Range of motion:   Flexion: 120  Good internal and external rotation  Good stability  The calf is soft and nontender with a negative Homans sign.        ASSESSMENT: 8 week status post right hip replacement.    PLAN: 1) continue strength and conditioning   2) continue to progress range of motion   3) continue HEP   4) progress activity as tolerated   5) Follow up in 8 weeks with repeat xrays      19 at 8:52 AM by Nathaniel Kate MD  "

## 2019-12-09 ENCOUNTER — HOSPITAL ENCOUNTER (OUTPATIENT)
Dept: PHYSICAL THERAPY | Facility: HOSPITAL | Age: 64
Setting detail: THERAPIES SERIES
Discharge: HOME | End: 2019-12-09

## 2019-12-09 DIAGNOSIS — M25.551 RIGHT HIP PAIN: Primary | ICD-10-CM

## 2019-12-09 DIAGNOSIS — M16.11 PRIMARY OSTEOARTHRITIS OF RIGHT HIP: ICD-10-CM

## 2019-12-09 PROCEDURE — 97035 APP MDLTY 1+ULTRASOUND EA 15: CPT

## 2019-12-09 PROCEDURE — 97110 THERAPEUTIC EXERCISES: CPT

## 2019-12-09 NOTE — THERAPY TREATMENT NOTE
Outpatient Physical Therapy Ortho Treatment Note  [facility]     Patient Name: Sam Infante  : 1955  MRN: [id number]  Today's Date: 2019      Visit Date: 2019     Subjective Improvement: 70-80%  Attendance:   (87 remain at initial eval)  Next MD Visit : 19  Recert Date:  19      Therapy Diagnosis:  S/P R BOUCHRA 10/14/19        Visit Dx:    ICD-10-CM ICD-9-CM   1. Right hip pain M25.551 719.45   2. Primary osteoarthritis of right hip M16.11 715.15       Patient Active Problem List   Diagnosis   • Borderline glaucoma, open angle with borderline findings   • Borderline glaucoma   • Left hand pain   • Bilateral carpal tunnel syndrome   • Right hip pain   • Primary osteoarthritis of right hip   • Essential hypertension   • Attention deficit hyperactivity disorder   • Benign prostatic hyperplasia   • Fatigue   • Gastroesophageal reflux disease without esophagitis   • Hyperlipidemia   • Mixed anxiety and depressive disorder   • BERNADETTE (obstructive sleep apnea)   • Routine physical examination   • History of total hip arthroplasty, right        Past Medical History:   Diagnosis Date   • Arthritis    • Elevated cholesterol    • GERD (gastroesophageal reflux disease)     OCCASIONAL   • High blood pressure    • Kidney stone    • Sleep apnea     using c-pap   • Vitreous floater 2013        Past Surgical History:   Procedure Laterality Date   • COLONOSCOPY     • RHINOPLASTY     • SEPTOPLASTY  2013    Nasal surgery procedure (Nasal septoplasty.)   • TOTAL HIP ARTHROPLASTY Right 10/14/2019    Procedure: TOTAL HIP ARTHROPLASTY ANTERIOR;  Surgeon: Nathaniel aKte MD;  Location: [facility];  Service: Orthopedics       PT Ortho     Row Name 19 1400       Precautions and Contraindications    Precautions/Limitations  anterior hip precautions- right  -KH       Subjective Pain    Able to rate subjective pain?  yes  -KH    Pre-Treatment Pain Level  0  -KH        Posture/Observations    Posture/Observations Comments  non antalgic; no AD  -      User Key  (r) = Recorded By, (t) = Taken By, (c) = Cosigned By    Initials Name Provider Type    Kelley Jim PTA Physical Therapy Assistant                      PT Assessment/Plan     Row Name 12/09/19 1400          PT Assessment    Assessment Comments  MD note faxed this date; continues to respond well to US to scar and anterior hip; proximal scar still tight but improving. No increased pain noted with exercises this date and continues to make progress with strength   -        PT Plan    PT Frequency  1x/week;2x/week  -     Predicted Duration of Therapy Intervention (Therapy Eval)  3-4 weeks  -     PT Plan Comments  Continue x2 more weeks with possible d/c at that time. SLS balance next visit. CC Resited gait; Continue US and manual as benefical.   -       User Key  (r) = Recorded By, (t) = Taken By, (c) = Cosigned By    Initials Name Provider Type    Kelley Jim PTA Physical Therapy Assistant          Modalities     Row Name 12/09/19 1400             Subjective Pain    Post-Treatment Pain Level  0  -KH         Ultrasound 41021    Location  R anterior scar/hip  -      Duty Cycle  100  -KH      Frequency  1.0 MHz  -KH      Intensity - Wts/cm  1.5  -KH      05240 - PT Ultrasound Minutes  8  -KH        User Key  (r) = Recorded By, (t) = Taken By, (c) = Cosigned By    Initials Name Provider Type    Kelley Jim PTA Physical Therapy Assistant        OP Exercises     Row Name 12/09/19 1400             Subjective Comments    Subjective Comments  Pt reports that he is doing about the same; Does feel that the US helped and he got some relief from it for a couple of hours  -         Subjective Pain    Able to rate subjective pain?  yes  -KH      Pre-Treatment Pain Level  0  -KH      Post-Treatment Pain Level  0  -KH         Exercise 1    Exercise Name 1  Pro ll LE strength  -KH      Cueing 1  Verbal  -      Time 1   "10'  -KH      Additional Comments  level 2.5; seat 8  -KH         Exercise 2    Exercise Name 2  US to R scar/anterior hip   -KH      Time 2  8' see modalities  -KH         Exercise 3    Exercise Name 3  Scar massage  -KH      Time 3  5'  -KH         Exercise 4    Exercise Name 4  Clamshells on L side  -KH      Cueing 4  Verbal  -KH      Sets 4  2  -KH      Reps 4  10  -KH         Exercise 5    Exercise Name 5  Incline stretch  -KH      Cueing 5  Verbal  -KH      Sets 5  3  -KH      Time 5  30\"  -KH         Exercise 6    Exercise Name 6  Standing hip flexor stretch  -KH      Cueing 6  Verbal  -KH      Sets 6  3  -KH      Time 6  30\"  -KH         Exercise 7    Exercise Name 7  Step ups fwd  -KH      Cueing 7  Verbal  -KH      Sets 7  2  -KH      Reps 7  10  -KH      Additional Comments  4\" step  -KH         Exercise 8    Exercise Name 8  Step downs, leading w/ L LE  -KH      Cueing 8  Verbal  -KH      Sets 8  2  -KH      Reps 8  10  -KH         Exercise 9    Exercise Name 9  Sit to stands  -KH      Cueing 9  Verbal  -KH      Sets 9  2  -KH      Reps 9  10  -KH      Additional Comments  no UE assist; low mat table  -KH         Exercise 10    Exercise Name 10  Lat stepping on airex beam  -KH      Cueing 10  Demo  -KH      Reps 10  5 trips  -KH         Exercise 11    Exercise Name 11  Heel to toe on airex beam  -KH      Cueing 11  Demo  -KH      Reps 11  5 trips  -KH        User Key  (r) = Recorded By, (t) = Taken By, (c) = Cosigned By    Initials Name Provider Type    Kelley Jim, JANESSA Physical Therapy Assistant                       PT OP Goals     Row Name 12/09/19 1400          PT Short Term Goals    STG Date to Achieve  -- all STGs met, defer  -KH        Long Term Goals    LTG 1  Improve R hip flex/R knee ext MMT to 4+/5   -KH     LTG 1 Progress  Not Met  -KH     LTG 2  Improve R hip flexion AROM to >/=90 degrees  -KH     LTG 2 Progress  Not Met  -KH     LTG 3  Able to ambulate with or without a SPC with a non " antalgic gait pattern  -     LTG 3 Progress  Met  -     LTG 3 Progress Comments  non antalgic first steps this date  -     LTG 4  Improve LEFS score to >/= 50  -     LTG 4 Progress  Not Met  -        Time Calculation    PT Goal Re-Cert Due Date  12/26/19  -       User Key  (r) = Recorded By, (t) = Taken By, (c) = Cosigned By    Initials Name Provider Type    Kelley Jim PTA Physical Therapy Assistant                         Time Calculation:   Start Time: 1455  Stop Time: 1537  Time Calculation (min): 42 min  Therapy Charges for Today     Code Description Service Date Service Provider Modifiers Qty    04300984434 HC PT ULTRASOUND EA 15 MIN 12/9/2019 Kelley Martines PTA GP 1    31492299396 HC PT THER PROC EA 15 MIN 12/9/2019 Kelley Martines PTA GP 2                    Kelley Martines PTA  12/9/2019

## 2019-12-12 DIAGNOSIS — M25.511 RIGHT SHOULDER PAIN, UNSPECIFIED CHRONICITY: Primary | ICD-10-CM

## 2019-12-13 ENCOUNTER — OFFICE VISIT (OUTPATIENT)
Dept: ORTHOPEDIC SURGERY | Facility: CLINIC | Age: 64
End: 2019-12-13

## 2019-12-13 VITALS — HEIGHT: 66 IN | BODY MASS INDEX: 30.53 KG/M2 | WEIGHT: 190 LBS

## 2019-12-13 DIAGNOSIS — M75.41 IMPINGEMENT SYNDROME OF RIGHT SHOULDER: ICD-10-CM

## 2019-12-13 DIAGNOSIS — M75.41 ROTATOR CUFF IMPINGEMENT SYNDROME OF RIGHT SHOULDER: Primary | ICD-10-CM

## 2019-12-13 DIAGNOSIS — M25.511 CHRONIC RIGHT SHOULDER PAIN: ICD-10-CM

## 2019-12-13 DIAGNOSIS — G89.29 CHRONIC RIGHT SHOULDER PAIN: ICD-10-CM

## 2019-12-13 DIAGNOSIS — M25.511 RIGHT SHOULDER PAIN, UNSPECIFIED CHRONICITY: ICD-10-CM

## 2019-12-13 PROCEDURE — 99213 OFFICE O/P EST LOW 20 MIN: CPT | Performed by: ORTHOPAEDIC SURGERY

## 2019-12-13 NOTE — PROGRESS NOTES
Sam Infante is a 64 y.o. male   Primary provider:  Provider, No Known       Chief Complaint   Patient presents with   • Right Shoulder - Pain   • Establish Care       HISTORY OF PRESENT ILLNESS: Patient being seen for right shoulder pain due to injury occurring May 2019. X-rays done today.  Patient states he has an old longstanding injury to his right shoulder but has been doing fairly well.  He states that in May 2019 he did have a reinjury causing increased pain in his right shoulder.  He has had a pretty consistent dull ache with intermittent sharp pains since then.  It does affect his sleeping.  No numbness or tingling.  Increased activity does cause his shoulder pain to be worse.  No numbness or tingling.    Pain   This is a new problem. The current episode started more than 1 month ago (May 2019). The problem occurs intermittently. Associated symptoms include headaches. Associated symptoms comments: Aching, clicking. He has tried acetaminophen and NSAIDs for the symptoms.        CONCURRENT MEDICAL HISTORY:    Past Medical History:   Diagnosis Date   • Arthritis    • Elevated cholesterol    • GERD (gastroesophageal reflux disease)     OCCASIONAL   • High blood pressure    • Kidney stone    • Sleep apnea     using c-pap   • Vitreous floater 05/24/2013       Allergies   Allergen Reactions   • Statins Other (See Comments)     Muscle pain         Current Outpatient Medications:   •  dextroamphetamine (DEXEDRINE SPANSULE) 15 MG 24 hr capsule, Take 15 mg by mouth 2 (Two) Times a Day., Disp: , Rfl:   •  latanoprost (XALATAN) 0.005 % ophthalmic solution, Administer 1 drop to both eyes Every Night., Disp: 2.5 mL, Rfl: 12  •  metoprolol succinate XL (TOPROL-XL) 50 MG 24 hr tablet, Take 50 mg by mouth Daily., Disp: , Rfl:   •  Multiple Vitamins-Minerals (PRESERVISION AREDS PO), Take 2 tablets by mouth Daily., Disp: , Rfl:   •  ondansetron (ZOFRAN) 8 MG tablet, Take  by mouth Every 8 (Eight) Hours As Needed for  "Nausea or Vomiting., Disp: , Rfl:   •  oxyCODONE-acetaminophen (PERCOCET) 7.5-325 MG per tablet, Take 1 tablet by mouth Every 6 (Six) Hours As Needed for Moderate Pain ., Disp: 40 tablet, Rfl: 0  •  SUMAtriptan (IMITREX) 20 MG/ACT nasal spray, Every 2 (Two) Hours As Needed., Disp: , Rfl:   •  verapamil PM (VERELAN PM) 360 MG 24 hr capsule, Take 360 mg by mouth daily., Disp: , Rfl:     Past Surgical History:   Procedure Laterality Date   • COLONOSCOPY     • RHINOPLASTY     • SEPTOPLASTY  12/18/2013    Nasal surgery procedure (Nasal septoplasty.)   • TOTAL HIP ARTHROPLASTY Right 10/14/2019    Procedure: TOTAL HIP ARTHROPLASTY ANTERIOR;  Surgeon: Nathaniel Kate MD;  Location: [facility];  Service: Orthopedics       Family History   Problem Relation Age of Onset   • Macular degeneration Mother    • Glaucoma Mother    • Heart disease Other    • Hypertension Other         Social History     Socioeconomic History   • Marital status:      Spouse name: Not on file   • Number of children: Not on file   • Years of education: Not on file   • Highest education level: Not on file   Tobacco Use   • Smoking status: Former Smoker     Types: Cigarettes   • Smokeless tobacco: Never Used   Substance and Sexual Activity   • Alcohol use: Never     Frequency: Never   • Drug use: No   • Sexual activity: Defer        Review of Systems   Neurological: Positive for headaches.   All other systems reviewed and are negative.      PHYSICAL EXAMINATION:       Ht 167.6 cm (66\")   Wt 86.2 kg (190 lb)   BMI 30.67 kg/m²     Physical Exam   Constitutional: He is oriented to person, place, and time. He appears well-developed and well-nourished.   Neurological: He is alert and oriented to person, place, and time.   Psychiatric: He has a normal mood and affect. His behavior is normal. Judgment and thought content normal.       GAIT:     [x]  Normal  []  Antalgic    Assistive device: [x]  None  []  Walker     []  Crutches  []  Cane     []  " Wheelchair  []  Stretcher    Right Shoulder Exam     Tenderness   The patient is experiencing tenderness in the acromioclavicular joint.    Range of Motion   Active abduction: 150 (Significant pain over 90 degrees.)   Forward flexion: 170     Muscle Strength   Abduction: 4/5   Supraspinatus: 4/5     Tests   Meier test: positive  Cross arm: positive  Impingement: positive    Other   Erythema: absent  Sensation: normal  Pulse: present    Comments:  Positive empty can test.              Xr Shoulder 2+ View Right    Result Date: 12/14/2019  Narrative: Ordering Provider:  Nathaniel Kate MD Ordering Diagnosis/Indication:  Right shoulder pain, unspecified chronicity Procedure:  XR SHOULDER 2+ VW RIGHT Exam Date:  12/13/19 COMPARISON:  Not applicable, no relevant images available.     Impression:  3 views of the right shoulder show acceptable position and alignment with no evidence of acute bony abnormality.  No fracture or dislocation is noted.  No significant arthritic changes noted in the glenohumeral joint.  Moderate AC joint arthritic changes noted. Nathaniel Kate MD 12/13/19           ASSESSMENT:    Diagnoses and all orders for this visit:    Rotator cuff impingement syndrome of right shoulder  -     MRI shoulder right wo contrast; Future    Right shoulder pain, unspecified chronicity    Impingement syndrome of right shoulder  -     MRI shoulder right wo contrast; Future    Chronic right shoulder pain  -     MRI shoulder right wo contrast; Future          PLAN    Patient is unable to take anti-inflammatory medicine due to recent psoas hematoma.  He has tried activity modification and home exercise program over the last 6 months without improvement.  He has pain with resisted abduction and external rotation.  Plan is to proceed with MRI of his right shoulder to assess for rotator cuff injury, impingement, or other internal derangement.  Follow-up after the MRI to determine further treatment  options.    Patient's Body mass index is 30.67 kg/m². BMI is above normal parameters. Recommendations include: exercise counseling and nutrition counseling.      Return for recheck for MRI results.    Nathaniel Kate MD

## 2019-12-16 ENCOUNTER — HOSPITAL ENCOUNTER (OUTPATIENT)
Dept: PHYSICAL THERAPY | Facility: HOSPITAL | Age: 64
Setting detail: THERAPIES SERIES
Discharge: HOME | End: 2019-12-16

## 2019-12-16 DIAGNOSIS — M25.551 RIGHT HIP PAIN: Primary | ICD-10-CM

## 2019-12-16 DIAGNOSIS — M16.11 PRIMARY OSTEOARTHRITIS OF RIGHT HIP: ICD-10-CM

## 2019-12-16 PROCEDURE — 97035 APP MDLTY 1+ULTRASOUND EA 15: CPT

## 2019-12-16 PROCEDURE — 97110 THERAPEUTIC EXERCISES: CPT

## 2019-12-16 NOTE — THERAPY TREATMENT NOTE
Outpatient Physical Therapy Ortho Treatment Note  [facility]     Patient Name: Sam Infante  : 1955  MRN: [id number]  Today's Date: 2019      Visit Date: 2019     Subjective Improvement: 70-80%  Attendance:  15/18 (87 remain at initial eval)  Next MD Visit : 2020  Recert Date:  19        Therapy Diagnosis:  S/P R BOUCHRA 10/14/19       Visit Dx:    ICD-10-CM ICD-9-CM   1. Right hip pain M25.551 719.45   2. Primary osteoarthritis of right hip M16.11 715.15       Patient Active Problem List   Diagnosis   • Borderline glaucoma, open angle with borderline findings   • Borderline glaucoma   • Left hand pain   • Bilateral carpal tunnel syndrome   • Right hip pain   • Primary osteoarthritis of right hip   • Essential hypertension   • Attention deficit hyperactivity disorder   • Benign prostatic hyperplasia   • Fatigue   • Gastroesophageal reflux disease without esophagitis   • Hyperlipidemia   • Mixed anxiety and depressive disorder   • BERNADETTE (obstructive sleep apnea)   • Routine physical examination   • History of total hip arthroplasty, right        Past Medical History:   Diagnosis Date   • Arthritis    • Elevated cholesterol    • GERD (gastroesophageal reflux disease)     OCCASIONAL   • High blood pressure    • Kidney stone    • Sleep apnea     using c-pap   • Vitreous floater 2013        Past Surgical History:   Procedure Laterality Date   • COLONOSCOPY     • RHINOPLASTY     • SEPTOPLASTY  2013    Nasal surgery procedure (Nasal septoplasty.)   • TOTAL HIP ARTHROPLASTY Right 10/14/2019    Procedure: TOTAL HIP ARTHROPLASTY ANTERIOR;  Surgeon: Nathaniel Kate MD;  Location: [facility];  Service: Orthopedics       PT Ortho     Row Name 19 1300       Precautions and Contraindications    Precautions/Limitations  anterior hip precautions- right  -KH       Subjective Pain    Able to rate subjective pain?  yes  -KH    Pre-Treatment Pain Level  0  -KH        Posture/Observations    Posture/Observations Comments  non antalgic; no AD  -      User Key  (r) = Recorded By, (t) = Taken By, (c) = Cosigned By    Initials Name Provider Type    Kelley Jim PTA Physical Therapy Assistant                      PT Assessment/Plan     Row Name 12/16/19 1300          PT Assessment    Assessment Comments  progressing really well with current treatment at this time; MD pleased with progress; No increased pain throughout treatment.   -        PT Plan    PT Frequency  1x/week;2x/week  -     Predicted Duration of Therapy Intervention (Therapy Eval)  3-4 weeks  -     PT Plan Comments  Recheck next week with possible d/c at that time  -       User Key  (r) = Recorded By, (t) = Taken By, (c) = Cosigned By    Initials Name Provider Type    Kelley Jim PTA Physical Therapy Assistant          Modalities     Row Name 12/16/19 1300             Subjective Comments    Subjective Comments  Pt reports that the last two weeks have made a huge difference and is doing really well. No real issues noted.   -         Ultrasound 30655    Location  R anterior scar/hip  -      Duty Cycle  100  -      Frequency  1.0 MHz  -      Intensity - Wts/cm  1.5  -      81447 - PT Ultrasound Minutes  8  -        User Key  (r) = Recorded By, (t) = Taken By, (c) = Cosigned By    Initials Name Provider Type    Kelley Jim PTA Physical Therapy Assistant        OP Exercises     Row Name 12/16/19 1300             Subjective Comments    Subjective Comments  Pt reports that the last two weeks have made a huge difference and is doing really well. No real issues noted.   -         Subjective Pain    Able to rate subjective pain?  yes  -      Pre-Treatment Pain Level  0  -      Post-Treatment Pain Level  0  -         Exercise 1    Exercise Name 1  Pro ll LE strength  -      Cueing 1  Verbal  -      Time 1  10'  -      Additional Comments  level 4; seat 9  -         Exercise 2     "Exercise Name 2  US to R scar/anterior hip   -      Time 2  8' see modalities  -KH         Exercise 3    Exercise Name 3  Step Ups fwd  -      Cueing 3  Verbal  -KH      Sets 3  2  -KH      Reps 3  10  -KH      Additional Comments  6\" step   -KH         Exercise 4    Exercise Name 4  standing hip flexor stretch  -      Cueing 4  Verbal  -KH      Sets 4  3  -KH      Time 4  30\"  -KH         Exercise 5    Exercise Name 5  Ramp 4 way   -KH      Cueing 5  Verbal  -      Reps 5  5 trips each   -KH         Exercise 6    Exercise Name 6  Recipercal Stairs no UE assist  -      Cueing 6  Verbal;Demo  -KH      Reps 6  1  -KH        User Key  (r) = Recorded By, (t) = Taken By, (c) = Cosigned By    Initials Name Provider Type    Kelley Jim PTA Physical Therapy Assistant                       PT OP Goals     Row Name 12/16/19 1300          PT Short Term Goals    STG Date to Achieve  -- all STGs met, defer  -        Long Term Goals    LTG 1  Improve R hip flex/R knee ext MMT to 4+/5   -KH     LTG 1 Progress  Not Met  -     LTG 2  Improve R hip flexion AROM to >/=90 degrees  -     LTG 2 Progress  Not Met  -     LTG 3  Able to ambulate with or without a SPC with a non antalgic gait pattern  -     LTG 3 Progress  Met  -     LTG 4  Improve LEFS score to >/= 50  -     LTG 4 Progress  Not Met  -KH        Time Calculation    PT Goal Re-Cert Due Date  12/26/19  -       User Key  (r) = Recorded By, (t) = Taken By, (c) = Cosigned By    Initials Name Provider Type    Kelley Jim PTA Physical Therapy Assistant                         Time Calculation:   Start Time: 1348  Stop Time: 1430  Time Calculation (min): 42 min  Total Timed Code Minutes- PT: 42 minute(s)  Therapy Charges for Today     Code Description Service Date Service Provider Modifiers Qty    50335591731 HC PT ULTRASOUND EA 15 MIN 12/16/2019 Kelley Martines PTA GP 1    88475710259 HC PT THER PROC EA 15 MIN 12/16/2019 Kelley aMrtines PTA GP 2           "          Kelley Martines, PTA  12/16/2019

## 2019-12-23 ENCOUNTER — HOSPITAL ENCOUNTER (OUTPATIENT)
Dept: PHYSICAL THERAPY | Facility: HOSPITAL | Age: 64
Setting detail: THERAPIES SERIES
Discharge: HOME | End: 2019-12-23

## 2019-12-23 ENCOUNTER — HOSPITAL ENCOUNTER (OUTPATIENT)
Dept: MRI IMAGING | Facility: HOSPITAL | Age: 64
Discharge: HOME | End: 2019-12-23
Admitting: ORTHOPAEDIC SURGERY

## 2019-12-23 DIAGNOSIS — M16.11 PRIMARY OSTEOARTHRITIS OF RIGHT HIP: ICD-10-CM

## 2019-12-23 DIAGNOSIS — M25.511 CHRONIC RIGHT SHOULDER PAIN: ICD-10-CM

## 2019-12-23 DIAGNOSIS — G89.29 CHRONIC RIGHT SHOULDER PAIN: ICD-10-CM

## 2019-12-23 DIAGNOSIS — M75.41 IMPINGEMENT SYNDROME OF RIGHT SHOULDER: ICD-10-CM

## 2019-12-23 DIAGNOSIS — M75.41 ROTATOR CUFF IMPINGEMENT SYNDROME OF RIGHT SHOULDER: ICD-10-CM

## 2019-12-23 DIAGNOSIS — M25.551 RIGHT HIP PAIN: Primary | ICD-10-CM

## 2019-12-23 PROCEDURE — 97110 THERAPEUTIC EXERCISES: CPT | Performed by: PHYSICAL THERAPIST

## 2019-12-23 PROCEDURE — 73221 MRI JOINT UPR EXTREM W/O DYE: CPT

## 2019-12-23 PROCEDURE — 97035 APP MDLTY 1+ULTRASOUND EA 15: CPT | Performed by: PHYSICAL THERAPIST

## 2019-12-23 NOTE — THERAPY DISCHARGE NOTE
Outpatient Physical Therapy Ortho Progress Note/Discharge Summary  [facility]     Patient Name: Sam Infante  : 1955  MRN: [id number]  Today's Date: 2019      Visit Date: 2019  Attendance:  Subjective % Improvement: 95%  Recert Date: D/C  MD appointment: 19    Therapy Diagnosis:  S/P R BOUCHRA 10/14/19  Visit Dx:    ICD-10-CM ICD-9-CM   1. Right hip pain M25.551 719.45   2. Primary osteoarthritis of right hip M16.11 715.15       Patient Active Problem List   Diagnosis   • Borderline glaucoma, open angle with borderline findings   • Borderline glaucoma   • Left hand pain   • Bilateral carpal tunnel syndrome   • Right hip pain   • Primary osteoarthritis of right hip   • Essential hypertension   • Attention deficit hyperactivity disorder   • Benign prostatic hyperplasia   • Fatigue   • Gastroesophageal reflux disease without esophagitis   • Hyperlipidemia   • Mixed anxiety and depressive disorder   • BERNADETTE (obstructive sleep apnea)   • Routine physical examination   • History of total hip arthroplasty, right        Past Medical History:   Diagnosis Date   • Arthritis    • Elevated cholesterol    • GERD (gastroesophageal reflux disease)     OCCASIONAL   • High blood pressure    • Kidney stone    • Sleep apnea     using c-pap   • Vitreous floater 2013        Past Surgical History:   Procedure Laterality Date   • COLONOSCOPY     • RHINOPLASTY     • SEPTOPLASTY  2013    Nasal surgery procedure (Nasal septoplasty.)   • TOTAL HIP ARTHROPLASTY Right 10/14/2019    Procedure: TOTAL HIP ARTHROPLASTY ANTERIOR;  Surgeon: Nathaniel Kate MD;  Location: [facility];  Service: Orthopedics       PT Ortho     Row Name 19 1609       Subjective Comments    Subjective Comments  Reports feeling much better.  Notes feeling 95% better. Has had some distal nerve pain of incision but is short and quick.   -BB       Precautions and Contraindications    Precautions/Limitations   anterior hip precautions- right  -BB       Subjective Pain    Able to rate subjective pain?  yes  -BB    Pre-Treatment Pain Level  0  -BB    Post-Treatment Pain Level  0  -BB       Posture/Observations    Posture/Observations Comments  no antalgics. Scar mobility remains decreased with scarring present that is firm but is improved. Noted tightness of quad with education to perform knee flexion AROM to assist.   -BB       General ROM    GENERAL ROM COMMENTS  standing hip flexion: 50, abd 45, sitting hip flexion 110.   -BB       MMT (Manual Muscle Testing)    General MMT Comments  Grossly 4+/5 in sitting   -BB       Sensation    Sensation WNL?  WNL  -BB      User Key  (r) = Recorded By, (t) = Taken By, (c) = Cosigned By    Initials Name Provider Type    Evelina Melendez, PT Physical Therapist                      PT Assessment/Plan     Row Name 12/23/19 8869          PT Assessment    Assessment Comments  Patient has progressed well with ROM and strength, patient is continueing to progress but is independent with HEP at this time and is ready to be DC to HEP and fitness membership with encouragement to return to PT in future with new order if problems arise.   -BB     Patient/caregiver participated in establishment of treatment plan and goals  Yes  -BB        PT Plan    Predicted Duration of Therapy Intervention (Therapy Eval)  D/C  -BB     PT Plan Comments  D/C to HEP and 1 month FF   -BB       User Key  (r) = Recorded By, (t) = Taken By, (c) = Cosigned By    Initials Name Provider Type    Evelina Melendez, PT Physical Therapist          Modalities     Row Name 12/23/19 160             Ultrasound 49853    Location  R anterior scar/hip  -BB      Duty Cycle  100  -BB      Frequency  1.0 MHz  -BB      Intensity - Wts/cm  1.5  -BB      15136 - PT Ultrasound Minutes  8  -BB        User Key  (r) = Recorded By, (t) = Taken By, (c) = Cosigned By    Initials Name Provider Type    Evelina Melendez, PT Physical Therapist           OP Exercises     Row Name 12/23/19 1609             Subjective Comments    Subjective Comments  Reports feeling much better.  Notes feeling 95% better. Has had some distal nerve pain of incision but is short and quick.   -BB         Subjective Pain    Able to rate subjective pain?  yes  -BB      Pre-Treatment Pain Level  0  -BB      Post-Treatment Pain Level  0  -BB         Exercise 1    Exercise Name 1  Pro ll LE strength  -BB      Time 1  8  -BB      Additional Comments  level 4 seat 9   -BB         Exercise 2    Exercise Name 2  recheck   -BB      Additional Comments  MMT/ ROM  -BB         Exercise 3    Exercise Name 3  US- See modalities   -BB      Time 3  8'  -BB         Exercise 4    Exercise Name 4  cybex Leg press  -BB      Sets 4  2  -BB      Reps 4  10  -BB      Additional Comments  70lbs  -BB         Exercise 5    Exercise Name 5  hip add cybex   -BB      Sets 5  2  -BB      Reps 5  10  -BB      Additional Comments  30lbs  -BB        User Key  (r) = Recorded By, (t) = Taken By, (c) = Cosigned By    Initials Name Provider Type    Evelina Melendez, ELEUTERIO Physical Therapist                         PT OP Goals     Row Name 12/23/19 1609          PT Short Term Goals    STG Date to Achieve  -- all STGs met, defer  -BB        Long Term Goals    LTG 1  Improve R hip flex/R knee ext MMT to 4+/5   -BB     LTG 1 Progress  Met  -BB     LTG 2  Improve R hip flexion AROM to >/=90 degrees  -BB     LTG 2 Progress  Met  -BB     LTG 3  Able to ambulate with or without a SPC with a non antalgic gait pattern  -BB     LTG 3 Progress  Met  -BB     LTG 4  Improve LEFS score to >/= 50  -BB     LTG 4 Progress  Met  -BB       User Key  (r) = Recorded By, (t) = Taken By, (c) = Cosigned By    Initials Name Provider Type    Evelina Melendez PT Physical Therapist          Therapy Education  Education Details: POC/HEP/FF memebership  Given: Other (comment)    Outcome Measure Options: Lower Extremity Functional Scale (LEFS)  Lower  Extremity Functional Index  Any of your usual work, housework or school activities: No difficulty  Your usual hobbies, recreational or sporting activities: A little bit of difficulty  Getting into or out of the bath: No difficulty  Walking between rooms: No difficulty  Putting on your shoes or socks: A little bit of difficulty  Squatting: A little bit of difficulty  Lifting an object, like a bag of groceries from the floor: No difficulty  Performing light activities around your home: A little bit of difficulty  Performing heavy activities around your home: A little bit of difficulty  Getting into or out of a car: No difficulty  Walking 2 blocks: No difficulty  Walking a mile: A little bit of difficulty  Going up or down 10 stairs (about 1 flight of stairs): No difficulty  Standing for 1 hour: A little bit of difficulty  Sitting for 1 hour: No difficulty  Running on even ground: A little bit of difficulty  Running on uneven ground: A little bit of difficulty  Making sharp turns while running fast: A little bit of difficulty  Hopping: No difficulty  Rolling over in bed: No difficulty  Total: 70      Time Calculation:   Start Time: 1609  Stop Time: 1640  Time Calculation (min): 31 min  Therapy Charges for Today     Code Description Service Date Service Provider Modifiers Qty    96559355563 HC PT ULTRASOUND EA 15 MIN 12/23/2019 Evelina Leblanc, PT GP 1    29724249330 HC PT THER PROC EA 15 MIN 12/23/2019 Evelina Leblanc, PT GP 1          PT G-Codes  Outcome Measure Options: Lower Extremity Functional Scale (LEFS)  Total: 70     OP PT Discharge Summary  Date of Discharge: 12/23/19  Reason for Discharge: All goals achieved, Independent  Outcomes Achieved: Able to achieve all goals within established timeline  Discharge Destination: Home with home program      Evelina Leblanc, PT  12/23/2019

## 2020-01-02 ENCOUNTER — OFFICE VISIT (OUTPATIENT)
Dept: ORTHOPEDIC SURGERY | Facility: CLINIC | Age: 65
End: 2020-01-02

## 2020-01-02 VITALS — BODY MASS INDEX: 30.7 KG/M2 | WEIGHT: 191 LBS | HEIGHT: 66 IN

## 2020-01-02 DIAGNOSIS — M75.41 ROTATOR CUFF IMPINGEMENT SYNDROME OF RIGHT SHOULDER: ICD-10-CM

## 2020-01-02 DIAGNOSIS — M75.41 IMPINGEMENT SYNDROME OF RIGHT SHOULDER: ICD-10-CM

## 2020-01-02 DIAGNOSIS — M25.511 RIGHT SHOULDER PAIN, UNSPECIFIED CHRONICITY: Primary | ICD-10-CM

## 2020-01-02 PROCEDURE — 99213 OFFICE O/P EST LOW 20 MIN: CPT | Performed by: ORTHOPAEDIC SURGERY

## 2020-01-02 NOTE — PROGRESS NOTES
"Sam Infante is a 64 y.o. male returns for     Chief Complaint   Patient presents with   • Right Shoulder - Pain, Follow-up   • Results       HISTORY OF PRESENT ILLNESS: fu right shoulder pain, mri done on 12/23/2019       CONCURRENT MEDICAL HISTORY:    The following portions of the patient's history were reviewed and updated as appropriate: allergies, current medications, past family history, past medical history, past social history, past surgical history and problem list.     ROS  No fevers or chills.  No chest pain or shortness of air.  No GI or  disturbances.    PHYSICAL EXAMINATION:       Ht 167.6 cm (66\")   Wt 86.6 kg (191 lb)   BMI 30.83 kg/m²     Physical Exam   Constitutional: He is oriented to person, place, and time. He appears well-developed and well-nourished.   Neurological: He is alert and oriented to person, place, and time.   Psychiatric: He has a normal mood and affect. His behavior is normal. Judgment and thought content normal.       GAIT:     []  Normal  []  Antalgic    Assistive device: []  None  []  Walker     []  Crutches  []  Cane     []  Wheelchair  []  Stretcher    Right Shoulder Exam     Tenderness   The patient is experiencing no tenderness.    Range of Motion   Active abduction: 150   Forward flexion: 170     Muscle Strength   Abduction: 4/5   Supraspinatus: 4/5     Other   Erythema: absent  Sensation: normal  Pulse: present              Xr Shoulder 2+ View Right    Result Date: 12/14/2019  Narrative: Ordering Provider:  Nathaniel Kate MD Ordering Diagnosis/Indication:  Right shoulder pain, unspecified chronicity Procedure:  XR SHOULDER 2+ VW RIGHT Exam Date:  12/13/19 COMPARISON:  Not applicable, no relevant images available.     Impression:  3 views of the right shoulder show acceptable position and alignment with no evidence of acute bony abnormality.  No fracture or dislocation is noted.  No significant arthritic changes noted in the glenohumeral joint.  " Moderate AC joint arthritic changes noted. Nathaniel Kate MD 12/13/19     Mri Shoulder Right Without Contrast    Result Date: 12/24/2019  Narrative: MRI right shoulder History:  Chronic right shoulder pain. Prior exam: Right shoulder September 13, 2019 TECHNIQUE: Multiplanar multisequence noncontrast images. Large full-thickness rotator cuff tear involving the entire supraspinatous and intraspinous. There is muscular tendinous retraction and there is both supraspinatous and infraspinatus muscle atrophy. The size of the gap in lateral to medial projection 4.44 cm. The size of the gap in AP projection 4.64 cm. Subscapularis is abnormal demonstrating heterogeneity and laxity. This indicates tendinosis or partial-thickness tear. There is slight upward migration of the humeral head with respect to glenoid often seen in long-standing complete rotator cuff tears. There is acromioclavicular joint arthrosis but there is no appreciable impingement. Biceps tendon is absent within the intertubercular sulcus indicating biceps tendon disruption and retraction. Subtle degenerative type tear superior labrum.     Impression: CONCLUSION: Large full-thickness rotator cuff tear involving the entire supraspinatous and infraspinatus tendons. There is muscular tendinous retraction and there is significant supraspinatous and infraspinatus muscle atrophy. Abnormal subscapularis demonstrating heterogeneity and laxity suggesting either tendinosis or partial-thickness interstitial tear. Acromioclavicular joint arthrosis without underlying impingement. Subtle degenerative type tears superior labrum. The biceps tendon is absent within the intertubercular sulcus, therefore  indicating biceps tendon disruption and retraction. Electronically signed by:  Sav Ann MD  12/24/2019 10:33 AM CST Workstation: [id number]            ASSESSMENT:    Diagnoses and all orders for this visit:    Right shoulder pain, unspecified chronicity    Rotator cuff  impingement syndrome of right shoulder    Impingement syndrome of right shoulder          PLAN    Continue activity as tolerated.  No restrictions  Pain is better.  Continue strength and conditioning  F/u as needed.  Discussed possible injection.  Discussed possible SCR or Rev TSA depending on situation at time of surgery if symptoms worsen.    Patient's Body mass index is 30.83 kg/m². BMI is above normal parameters. Recommendations include: exercise counseling and nutrition counseling.      Return if symptoms worsen or fail to improve, for recheck.    Nathaniel Kate MD

## 2020-02-04 DIAGNOSIS — Z96.641 STATUS POST TOTAL REPLACEMENT OF RIGHT HIP: Primary | ICD-10-CM

## 2020-02-06 ENCOUNTER — OFFICE VISIT (OUTPATIENT)
Dept: ORTHOPEDIC SURGERY | Facility: CLINIC | Age: 65
End: 2020-02-06

## 2020-02-06 VITALS — BODY MASS INDEX: 31.02 KG/M2 | HEIGHT: 66 IN | WEIGHT: 193 LBS

## 2020-02-06 DIAGNOSIS — Z96.641 PRESENCE OF RIGHT HIP IMPLANT: Primary | ICD-10-CM

## 2020-02-06 DIAGNOSIS — I10 ESSENTIAL HYPERTENSION: ICD-10-CM

## 2020-02-06 DIAGNOSIS — K21.9 GASTROESOPHAGEAL REFLUX DISEASE WITHOUT ESOPHAGITIS: ICD-10-CM

## 2020-02-06 PROCEDURE — 99213 OFFICE O/P EST LOW 20 MIN: CPT | Performed by: ORTHOPAEDIC SURGERY

## 2020-02-06 RX ORDER — LATANOPROST 50 UG/ML
1 SOLUTION/ DROPS OPHTHALMIC NIGHTLY
COMMUNITY
End: 2021-11-19 | Stop reason: SDUPTHER

## 2020-02-06 NOTE — PROGRESS NOTES
"Sam Infante is a 64 y.o. male returns for     Chief Complaint   Patient presents with   • Right Hip - Follow-up        HISTORY OF PRESENT ILLNESS: f/u right total hip, repeat xrays done today. No new problems pain score today 0/10    10/14/19 (115d) Nathaniel Kate MD     Total Hip Arthroplasty Anterior - Right     No new complaints.  No numbness or tingling.    No fevers or chills.  Strength is improving.  He reports that the difference from before surgery is remarkable.         CONCURRENT MEDICAL HISTORY:    The following portions of the patient's history were reviewed and updated as appropriate: allergies, current medications, past family history, past medical history, past social history, past surgical history and problem list.     ROS  No fevers or chills.  No chest pain or shortness of air.  No GI or  disturbances.    PHYSICAL EXAMINATION:       Ht 167.6 cm (66\")   Wt 87.5 kg (193 lb)   BMI 31.15 kg/m²     Physical Exam   Constitutional: He is oriented to person, place, and time. He appears well-developed and well-nourished.   Neurological: He is alert and oriented to person, place, and time.   Psychiatric: He has a normal mood and affect. His behavior is normal. Judgment and thought content normal.       GAIT:     []  Normal  []  Antalgic    Assistive device: [x]  None  []  Walker     []  Crutches  []  Cane     []  Wheelchair  []  Stretcher    Right Hip Exam     Tenderness   The patient is experiencing no tenderness.     Range of Motion   Flexion: 120     Muscle Strength   Flexion: 5/5     Tests   BROOKLYNN: negative  Fadir:  Negative FADIR test    Other   Erythema: absent  Scars: present  Sensation: normal  Pulse: present              Xr Hip With Or Without Pelvis 2 - 3 View Right    Result Date: 2/6/2020  Narrative: Ordering Provider:  Nathaniel Kate MD Ordering Diagnosis/Indication:  Status post total replacement of right hip Procedure:  XR HIP W OR WO PELVIS 2-3 VIEW RIGHT Exam " Date:  2/6/20 COMPARISON:  Todays X-rays were compared to previous images dated October 28, 2019.     Impression:  AP standing of the pelvis with AP and lateral of the right hip show acceptable position and alignment of a right total hip arthroplasty.  No sign of implant loosening or failure is noted.  The anterior column acetabular healing area shows improvement.  No sign of loosening of the screws.  Appropriate alignment is noted as is appropriate leg length on the AP pelvis.  No other acute findings. Nathaniel Kate MD 2/6/20             ASSESSMENT:    Diagnoses and all orders for this visit:    Presence of right hip implant    Essential hypertension    Gastroesophageal reflux disease without esophagitis    Other orders  -     latanoprost (XALATAN) 0.005 % ophthalmic solution; 1 drop Every Night.          PLAN    Continue strength and conditioning as tolerated.  No restrictions.  Slowly progress motion and activity as pain allows.  Follow-up 1 year after surgery with x-rays.    Patient's Body mass index is 31.15 kg/m². BMI is above normal parameters. Recommendations include: exercise counseling and nutrition counseling.    Return in about 1 year (around 2/6/2021) for Recheck with repeat xrays.    Nathaniel Kate MD

## 2020-06-26 ENCOUNTER — TELEPHONE (OUTPATIENT)
Dept: ORTHOPEDIC SURGERY | Facility: CLINIC | Age: 65
End: 2020-06-26

## 2020-06-26 DIAGNOSIS — Z96.641 HX OF TOTAL HIP ARTHROPLASTY, RIGHT: Primary | ICD-10-CM

## 2020-06-26 RX ORDER — NAPROXEN 500 MG/1
500 TABLET ORAL 2 TIMES DAILY
Qty: 60 TABLET | Refills: 11 | Status: SHIPPED | OUTPATIENT
Start: 2020-06-26 | End: 2020-07-06

## 2020-06-26 NOTE — TELEPHONE ENCOUNTER
QUETA      Pt CALLED REQUESTING A REFILL OF   NAPROXEN (EC NAPROSYN) 500MG EC TABLET  LAST FILLED BY [facility] WREN 5/30/2019 WITH 11 REFILLS   Pt HAS RAN OUT OF REFILLS

## 2020-07-06 RX ORDER — NAPROXEN 500 MG/1
500 TABLET ORAL 2 TIMES DAILY WITH MEALS
Qty: 60 TABLET | Refills: 11 | Status: SHIPPED | OUTPATIENT
Start: 2020-07-06 | End: 2021-08-26 | Stop reason: SDUPTHER

## 2020-10-13 DIAGNOSIS — Z96.641 HX OF TOTAL HIP ARTHROPLASTY, RIGHT: Primary | ICD-10-CM

## 2020-10-15 ENCOUNTER — OFFICE VISIT (OUTPATIENT)
Dept: ORTHOPEDIC SURGERY | Facility: CLINIC | Age: 65
End: 2020-10-15

## 2020-10-15 VITALS — WEIGHT: 185 LBS | HEIGHT: 66 IN | BODY MASS INDEX: 29.73 KG/M2

## 2020-10-15 DIAGNOSIS — I10 ESSENTIAL HYPERTENSION: ICD-10-CM

## 2020-10-15 DIAGNOSIS — Z96.641 HX OF TOTAL HIP ARTHROPLASTY, RIGHT: Primary | ICD-10-CM

## 2020-10-15 DIAGNOSIS — G47.33 OSA (OBSTRUCTIVE SLEEP APNEA): ICD-10-CM

## 2020-10-15 PROCEDURE — 99213 OFFICE O/P EST LOW 20 MIN: CPT | Performed by: ORTHOPAEDIC SURGERY

## 2020-10-15 NOTE — PROGRESS NOTES
"Sam Infante is a 65 y.o. male returns for     Chief Complaint   Patient presents with   • Right Hip - Follow-up     X-rays done today in office  HISTORY OF PRESENT ILLNESS:  He reports no problems in regards to his right hip.  He has mild numbness on the side of his right hip but that has not changed.  No tingling.  No fevers or chills.  He is very pleased with his mobility and his outcome.     CONCURRENT MEDICAL HISTORY:    The following portions of the patient's history were reviewed and updated as appropriate: allergies, current medications, past family history, past medical history, past social history, past surgical history and problem list.     ROS  No fevers or chills.  No chest pain or shortness of air.  No GI or  disturbances.    PHYSICAL EXAMINATION:       Ht 167.6 cm (66\")   Wt 83.9 kg (185 lb)   BMI 29.86 kg/m²     Physical Exam  Constitutional:       General: He is not in acute distress.     Appearance: He is well-developed. He is not ill-appearing.   Pulmonary:      Effort: Pulmonary effort is normal. No respiratory distress.   Neurological:      Mental Status: He is alert and oriented to person, place, and time.   Psychiatric:         Mood and Affect: Mood normal.         Behavior: Behavior normal.         Thought Content: Thought content normal.         Judgment: Judgment normal.         GAIT:     [x]  Normal  []  Antalgic    Assistive device: [x]  None  []  Walker     []  Crutches  []  Cane     []  Wheelchair  []  Stretcher    Right Hip Exam     Comments:  Hip is nontender.  Good range of motion.  Incision is well-healed.  Good distal pulses and sensation.  He walks without a limp.  Good muscle tone and strength.  Stable joint exam              Xr Hip With Or Without Pelvis 2 - 3 View Right    Result Date: 10/15/2020  Narrative: Ordering Provider:  Nathaniel Kate MD Ordering Diagnosis/Indication:  Hx of total hip arthroplasty, right Procedure:  XR HIP W OR WO PELVIS 2-3 VIEW " RIGHT Exam Date:  10/15/20 COMPARISON:  Todays X-rays were compared to previous images dated February 6, 2020.     Impression:  AP standing of the pelvis with AP and lateral of the right hip show acceptable position and alignment of a right total hip arthroplasty.  No signs of implant loosening or failure is noted.  There are 2 fixation screws up into the ilium.  No sign of interval change from prior x-ray.  Minimal arthritic change noted in the left hip. Nathaniel Kate MD 10/15/20             ASSESSMENT:    Diagnoses and all orders for this visit:    Hx of total hip arthroplasty, right    Essential hypertension    BERNADETTE (obstructive sleep apnea)          PLAN    Activity as tolerated.  No restrictions.  Continue strength and conditioning as pain allows.  Follow-up as needed.    Patient's Body mass index is 29.86 kg/m². BMI is above normal parameters. Recommendations include: exercise counseling and nutrition counseling.  Return if symptoms worsen or fail to improve, for recheck.    Nathaniel Kate MD

## 2020-11-24 ENCOUNTER — E-VISIT (OUTPATIENT)
Dept: FAMILY MEDICINE CLINIC | Facility: TELEHEALTH | Age: 65
End: 2020-11-24

## 2021-08-25 NOTE — TELEPHONE ENCOUNTER
QUETA    PT CALLED TO REQUEST A REFILL ON NAPROXEN. PT USES [facility]. PT CALL BACK NUMBER [phone].

## 2021-08-26 RX ORDER — NAPROXEN 500 MG/1
500 TABLET ORAL 2 TIMES DAILY WITH MEALS
Qty: 60 TABLET | Refills: 11 | Status: SHIPPED | OUTPATIENT
Start: 2021-08-26 | End: 2021-12-03 | Stop reason: ALTCHOICE

## 2021-11-18 DIAGNOSIS — M25.562 ACUTE PAIN OF LEFT KNEE: Primary | ICD-10-CM

## 2021-11-19 ENCOUNTER — OFFICE VISIT (OUTPATIENT)
Dept: ORTHOPEDIC SURGERY | Facility: CLINIC | Age: 66
End: 2021-11-19

## 2021-11-19 VITALS — WEIGHT: 195.7 LBS | HEIGHT: 66 IN | BODY MASS INDEX: 31.45 KG/M2

## 2021-11-19 DIAGNOSIS — I10 ESSENTIAL HYPERTENSION: ICD-10-CM

## 2021-11-19 DIAGNOSIS — M17.0 PRIMARY OSTEOARTHRITIS OF BOTH KNEES: Primary | ICD-10-CM

## 2021-11-19 DIAGNOSIS — M25.562 ACUTE PAIN OF LEFT KNEE: ICD-10-CM

## 2021-11-19 DIAGNOSIS — G47.33 OSA (OBSTRUCTIVE SLEEP APNEA): ICD-10-CM

## 2021-11-19 DIAGNOSIS — Z96.641 PRESENCE OF RIGHT HIP IMPLANT: ICD-10-CM

## 2021-11-19 PROCEDURE — 99213 OFFICE O/P EST LOW 20 MIN: CPT | Performed by: ORTHOPAEDIC SURGERY

## 2021-11-19 PROCEDURE — 20610 DRAIN/INJ JOINT/BURSA W/O US: CPT | Performed by: ORTHOPAEDIC SURGERY

## 2021-11-19 RX ADMIN — TRIAMCINOLONE ACETONIDE 40 MG: 40 INJECTION, SUSPENSION INTRA-ARTICULAR; INTRAMUSCULAR at 10:07

## 2021-11-19 RX ADMIN — LIDOCAINE HYDROCHLORIDE 2 ML: 10 INJECTION, SOLUTION EPIDURAL; INFILTRATION; INTRACAUDAL; PERINEURAL at 10:07

## 2021-11-19 RX ADMIN — LIDOCAINE HYDROCHLORIDE 2 ML: 10 INJECTION, SOLUTION INFILTRATION; PERINEURAL at 10:07

## 2021-11-19 NOTE — PROGRESS NOTES
Sam Infante is a 66 y.o. male   Primary provider:  Miladis Jackson MD       Chief Complaint   Patient presents with   • Left Knee - Pain   • Establish Care       HISTORY OF PRESENT ILLNESS: Patient being seen for left knee pain. Reports no known injury. X-rays done today.   He reports pain in both knees.  Mostly he has dull achy pain but he has occasional sharp stabbing pains.  No specific injury.  No fevers or chills.  No numbness or tingling.    Pain  This is a chronic problem. The current episode started more than 1 year ago (4 years). The problem occurs intermittently. Associated symptoms comments: Grinding. The symptoms are aggravated by walking. He has tried acetaminophen and NSAIDs for the symptoms.        CONCURRENT MEDICAL HISTORY:    Past Medical History:   Diagnosis Date   • Arthritis    • Elevated cholesterol    • GERD (gastroesophageal reflux disease)     OCCASIONAL   • High blood pressure    • Kidney stone    • Sleep apnea     using c-pap   • Vitreous floater 05/24/2013       Allergies   Allergen Reactions   • Statins Other (See Comments)     Muscle pain         Current Outpatient Medications:   •  latanoprost (XALATAN) 0.005 % ophthalmic solution, Administer 1 drop to both eyes Every Night., Disp: 2.5 mL, Rfl: 12  •  metoprolol succinate XL (TOPROL-XL) 50 MG 24 hr tablet, Take 50 mg by mouth Daily., Disp: , Rfl:   •  Multiple Vitamins-Minerals (PRESERVISION AREDS PO), Take 2 tablets by mouth Daily., Disp: , Rfl:   •  naproxen (EC-Naproxen) 500 MG EC tablet, Take 1 tablet by mouth 2 (Two) Times a Day With Meals., Disp: 60 tablet, Rfl: 11  •  ondansetron (ZOFRAN) 8 MG tablet, Take  by mouth Every 8 (Eight) Hours As Needed for Nausea or Vomiting., Disp: , Rfl:   •  verapamil PM (VERELAN PM) 360 MG 24 hr capsule, Take 360 mg by mouth daily., Disp: , Rfl:     Past Surgical History:   Procedure Laterality Date   • COLONOSCOPY     • RHINOPLASTY     • SEPTOPLASTY  12/18/2013    Nasal surgery  "procedure (Nasal septoplasty.)   • TOTAL HIP ARTHROPLASTY Right 10/14/2019    Procedure: TOTAL HIP ARTHROPLASTY ANTERIOR;  Surgeon: Nathaniel Kate MD;  Location: [facility];  Service: Orthopedics       Family History   Problem Relation Age of Onset   • Macular degeneration Mother    • Glaucoma Mother    • Heart disease Other    • Hypertension Other        Social History     Socioeconomic History   • Marital status:    Tobacco Use   • Smoking status: Former Smoker     Types: Cigarettes   • Smokeless tobacco: Never Used   Substance and Sexual Activity   • Alcohol use: Never   • Drug use: No   • Sexual activity: Defer        Review of Systems   Constitutional: Negative.    HENT: Negative.    Eyes: Negative.    Respiratory: Negative.    Cardiovascular: Negative.    Gastrointestinal: Negative.    Endocrine: Negative.    Genitourinary: Negative.    Musculoskeletal:        Bilateral knee pain   Skin: Negative.    Allergic/Immunologic: Negative.    Neurological: Negative.    Hematological: Negative.    Psychiatric/Behavioral: Negative.        PHYSICAL EXAMINATION:       Ht 167.6 cm (66\")   Wt 88.8 kg (195 lb 11.2 oz)   BMI 31.59 kg/m²     Physical Exam  Constitutional:       General: He is not in acute distress.     Appearance: Normal appearance.   Pulmonary:      Effort: Pulmonary effort is normal. No respiratory distress.   Neurological:      Mental Status: He is alert and oriented to person, place, and time.         GAIT:     [x]  Normal  []  Antalgic    Assistive device: [x]  None  []  Walker     []  Crutches  []  Cane     []  Wheelchair  []  Stretcher    Right Knee Exam     Muscle Strength   The patient has normal right knee strength.    Tenderness   Right knee tenderness location: diffuse.    Range of Motion   Extension: -5   Flexion: 120     Tests   Varus: negative Valgus: negative  Drawer:  Anterior - negative    Posterior - negative    Other   Sensation: normal  Pulse: present  Swelling: " mild    Comments:  Crepitation on motion.  Mild to moderate pain through arc of motion      Left Knee Exam     Muscle Strength   The patient has normal left knee strength.    Tenderness   Left knee tenderness location: diffuse.    Range of Motion   Extension: -5   Flexion: 120     Tests   Varus: negative Valgus: negative  Drawer:  Anterior - negative     Posterior - negative    Other   Sensation: normal  Pulse: present  Swelling: none    Comments:  Crepitation with motion  Mild to moderate pain through arc of motion                  XR Knee 1 or 2 View Left    Result Date: 11/19/2021  Narrative: Ordering Provider:  Nathaniel Kate MD Ordering Diagnosis/Indication:  Acute pain of left knee Procedure:  XR KNEE 1 OR 2 VW LEFT Exam Date:  11/19/21 COMPARISON:  Not applicable, no relevant images available.     Impression:  AP bilateral standing of the knees with lateral of the left knee shows significant medial joint space narrowing in both knees with the right side being slightly worse than the left.  Lateral subluxation of the tibia is noted in both knees as well.  Significant varus positioning in both knees.  Lateral of the left knee shows moderate patellofemoral joint arthritic changes.  End stage osteoarthritic changes noted in both knees. Nathaniel Kate MD 11/19/21     XR Knee Bilateral AP Standing    Result Date: 11/19/2021  Narrative: Ordering Provider:  Nathaniel Kate MD Ordering Diagnosis/Indication:  Acute pain of left knee Procedure:  XR KNEE BILATERAL AP STANDING Exam Date:  11/19/21 COMPARISON:  Not applicable, no relevant images available.     Impression:  AP bilateral standing of the knees with lateral of the left knee shows significant medial joint space narrowing in both knees with the right side being slightly worse than the left.  Lateral subluxation of the tibia is noted in both knees as well.  Significant varus positioning in both knees.  Lateral of the left knee shows  moderate patellofemoral joint arthritic changes.  End stage osteoarthritic changes noted in both knees. Nathaniel Kate MD 11/19/21           ASSESSMENT:    Diagnoses and all orders for this visit:    Primary osteoarthritis of both knees  -     Large Joint Arthrocentesis: R knee  -     Large Joint Arthrocentesis: L knee    Acute pain of left knee  -     Large Joint Arthrocentesis: R knee  -     Large Joint Arthrocentesis: L knee    Essential hypertension    BERNADETTE (obstructive sleep apnea)    Presence of right hip implant          PLAN    He has osteoarthritic changes in both knees.  We discussed proceeding with a steroid injection today and proceeding with viscosupplementation in both knees.  We began discussion along the osteoarthritis treatment algorithm.  We discussed general strength and conditioning exercises, use of a cane as necessary, and the eventual possibility of total knee arthroplasty.  Follow-up for viscosupplementation.    Large Joint Arthrocentesis: R knee  Date/Time: 11/19/2021 10:07 AM  Consent given by: patient  Site marked: site marked  Timeout: Immediately prior to procedure a time out was called to verify the correct patient, procedure, equipment, support staff and site/side marked as required   Supporting Documentation  Indications: pain   Procedure Details  Location: knee - R knee  Preparation: Patient was prepped and draped in the usual sterile fashion  Needle size: 22 G  Approach: anteromedial  Medications administered: 40 mg triamcinolone acetonide 40 MG/ML; 2 mL lidocaine 1 %  Patient tolerance: patient tolerated the procedure well with no immediate complications    Large Joint Arthrocentesis: L knee  Date/Time: 11/19/2021 10:07 AM  Consent given by: patient  Site marked: site marked  Timeout: Immediately prior to procedure a time out was called to verify the correct patient, procedure, equipment, support staff and site/side marked as required   Supporting Documentation  Indications:  pain   Procedure Details  Location: knee - L knee  Preparation: Patient was prepped and draped in the usual sterile fashion  Needle size: 22 G  Approach: anteromedial  Medications administered: 40 mg triamcinolone acetonide 40 MG/ML; 2 mL lidocaine PF 1% 1 %  Patient tolerance: patient tolerated the procedure well with no immediate complications          Return for visco-supplementation.    Nathaniel Kate MD

## 2021-11-20 RX ORDER — LIDOCAINE HYDROCHLORIDE 10 MG/ML
2 INJECTION, SOLUTION INFILTRATION; PERINEURAL
Status: COMPLETED | OUTPATIENT
Start: 2021-11-19 | End: 2021-11-19

## 2021-11-20 RX ORDER — TRIAMCINOLONE ACETONIDE 40 MG/ML
40 INJECTION, SUSPENSION INTRA-ARTICULAR; INTRAMUSCULAR
Status: COMPLETED | OUTPATIENT
Start: 2021-11-19 | End: 2021-11-19

## 2021-11-20 RX ORDER — LIDOCAINE HYDROCHLORIDE 10 MG/ML
2 INJECTION, SOLUTION EPIDURAL; INFILTRATION; INTRACAUDAL; PERINEURAL
Status: COMPLETED | OUTPATIENT
Start: 2021-11-19 | End: 2021-11-19

## 2021-12-03 ENCOUNTER — CLINICAL SUPPORT (OUTPATIENT)
Dept: ORTHOPEDIC SURGERY | Facility: CLINIC | Age: 66
End: 2021-12-03

## 2021-12-03 VITALS — HEIGHT: 66 IN | BODY MASS INDEX: 31.02 KG/M2 | WEIGHT: 193 LBS

## 2021-12-03 DIAGNOSIS — M17.0 PRIMARY OSTEOARTHRITIS OF BOTH KNEES: Primary | ICD-10-CM

## 2021-12-03 PROCEDURE — 20610 DRAIN/INJ JOINT/BURSA W/O US: CPT | Performed by: ORTHOPAEDIC SURGERY

## 2021-12-03 RX ORDER — MELOXICAM 15 MG/1
TABLET ORAL
Qty: 30 TABLET | Refills: 3 | Status: SHIPPED | OUTPATIENT
Start: 2021-12-03 | End: 2022-03-25

## 2021-12-03 NOTE — PROGRESS NOTES
"Knee Joint Injection      Patient: Sam Infante    YOB: 1955    Chief Complaint   Patient presents with   • Injections     Bilateral synvisc one        History of Present Illness:  Patient is here for an injection.  No new complaints.    Physical Exam: 66 y.o. male  General Appearance:    Alert, cooperative, in no acute distress                   Vitals:    12/03/21 0856   Weight: 87.5 kg (193 lb)   Height: 167.6 cm (66\")   PainSc:   1   PainLoc: Knee        Patient is alert and oriented, ×3 no acute distress.  Affect is normal respiratory rate is normal unlabored.  Exam and complaints are unchanged.    Procedure:  Large Joint Arthrocentesis: R knee  Date/Time: 12/3/2021 8:57 AM  Consent given by: patient  Site marked: site marked  Timeout: Immediately prior to procedure a time out was called to verify the correct patient, procedure, equipment, support staff and site/side marked as required   Supporting Documentation  Indications: pain   Procedure Details  Location: knee - R knee  Preparation: Patient was prepped and draped in the usual sterile fashion  Needle size: 22 G  Approach: anteromedial  Medications administered: 48 mg hylan 48 MG/6ML  Patient tolerance: patient tolerated the procedure well with no immediate complications    Large Joint Arthrocentesis: L knee  Date/Time: 12/3/2021 8:57 AM  Consent given by: patient  Site marked: site marked  Timeout: Immediately prior to procedure a time out was called to verify the correct patient, procedure, equipment, support staff and site/side marked as required   Supporting Documentation  Indications: pain   Procedure Details  Location: knee - L knee  Preparation: Patient was prepped and draped in the usual sterile fashion  Needle size: 22 G  Approach: anteromedial  Medications administered: 48 mg hylan 48 MG/6ML  Patient tolerance: patient tolerated the procedure well with no immediate complications          Assessment:    Diagnoses and all " orders for this visit:    Primary osteoarthritis of both knees  -     Large Joint Arthrocentesis: R knee  -     Large Joint Arthrocentesis: L knee    Other orders  -     meloxicam (MOBIC) 15 MG tablet; 1 PO Daily with food.        Plan:   Slowly increase activity as tolerated.  Discussed importance of leg strengthening and general conditioning.  Discussed warning signs of injection.  Discussed that purpose of injections was symptom improvement and improved activity.  Also discussed that further treatment options depended on symptoms at followup and length of time of improvement after injections.    Return if symptoms worsen or fail to improve, for recheck.    Nathaniel Kate MD

## 2022-03-25 RX ORDER — MELOXICAM 15 MG/1
TABLET ORAL
Qty: 30 TABLET | Refills: 2 | Status: SHIPPED | OUTPATIENT
Start: 2022-03-25 | End: 2022-07-22

## 2022-05-12 ENCOUNTER — OFFICE VISIT (OUTPATIENT)
Dept: ORTHOPEDIC SURGERY | Facility: CLINIC | Age: 67
End: 2022-05-12

## 2022-05-12 VITALS — WEIGHT: 199.9 LBS | HEIGHT: 66 IN | BODY MASS INDEX: 32.13 KG/M2

## 2022-05-12 DIAGNOSIS — M17.0 PRIMARY OSTEOARTHRITIS OF BOTH KNEES: Primary | ICD-10-CM

## 2022-05-12 PROCEDURE — 20610 DRAIN/INJ JOINT/BURSA W/O US: CPT | Performed by: ORTHOPAEDIC SURGERY

## 2022-05-12 RX ORDER — TRIAMCINOLONE ACETONIDE 40 MG/ML
40 INJECTION, SUSPENSION INTRA-ARTICULAR; INTRAMUSCULAR
Status: COMPLETED | OUTPATIENT
Start: 2022-05-12 | End: 2022-05-12

## 2022-05-12 RX ORDER — BUPIVACAINE HYDROCHLORIDE 5 MG/ML
2 INJECTION, SOLUTION PERINEURAL
Status: COMPLETED | OUTPATIENT
Start: 2022-05-12 | End: 2022-05-12

## 2022-05-12 RX ADMIN — BUPIVACAINE HYDROCHLORIDE 2 ML: 5 INJECTION, SOLUTION PERINEURAL at 09:25

## 2022-05-12 RX ADMIN — TRIAMCINOLONE ACETONIDE 40 MG: 40 INJECTION, SUSPENSION INTRA-ARTICULAR; INTRAMUSCULAR at 09:25

## 2022-05-12 RX ADMIN — BUPIVACAINE HYDROCHLORIDE 2 ML: 5 INJECTION, SOLUTION PERINEURAL at 09:26

## 2022-05-12 RX ADMIN — TRIAMCINOLONE ACETONIDE 40 MG: 40 INJECTION, SUSPENSION INTRA-ARTICULAR; INTRAMUSCULAR at 09:26

## 2022-05-12 NOTE — PROGRESS NOTES
"Sam Infante is a 66 y.o. male returns for     Chief Complaint   Patient presents with   • Right Knee - Follow-up   • Left Knee - Follow-up       HISTORY OF PRESENT ILLNESS:  Follow up Bilateral knee pain.  Patient is requesting steroid injection into both knees today.  He was given steroid injection on 11/19/21 and Synvisc One on 12/3/21       CONCURRENT MEDICAL HISTORY:    The following portions of the patient's history were reviewed and updated as appropriate: allergies, current medications, past family history, past medical history, past social history, past surgical history and problem list.     ROS  No fevers or chills.  No chest pain or shortness of air.  No GI or  disturbances.    PHYSICAL EXAMINATION:       Ht 167.6 cm (66\")   Wt 90.7 kg (199 lb 14.4 oz)   BMI 32.26 kg/m²     Physical Exam  Constitutional:       General: He is not in acute distress.     Appearance: Normal appearance.   Pulmonary:      Effort: Pulmonary effort is normal. No respiratory distress.   Neurological:      Mental Status: He is alert and oriented to person, place, and time.                     ASSESSMENT:    Diagnoses and all orders for this visit:    Primary osteoarthritis of both knees  -     Large Joint Arthrocentesis: R knee  -     Large Joint Arthrocentesis: L knee          PLAN    We discussed proceeding with repeat steroid injections in both knees today.  We discussed the possibility of repeat injections, viscosupplementation, and the eventual probability of total knee arthroplasty.  He will call us when symptoms worsen again to determine further treatment options.    Large Joint Arthrocentesis: R knee  Date/Time: 5/12/2022 9:25 AM  Consent given by: patient  Site marked: site marked  Timeout: Immediately prior to procedure a time out was called to verify the correct patient, procedure, equipment, support staff and site/side marked as required   Supporting Documentation  Indications: pain   Procedure " Details  Location: knee - R knee  Preparation: Patient was prepped and draped in the usual sterile fashion  Needle size: 22 G  Approach: anteromedial  Medications administered: 40 mg triamcinolone acetonide 40 MG/ML; 2 mL bupivacaine 0.5 %  Patient tolerance: patient tolerated the procedure well with no immediate complications    Large Joint Arthrocentesis: L knee  Date/Time: 5/12/2022 9:26 AM  Consent given by: patient  Site marked: site marked  Timeout: Immediately prior to procedure a time out was called to verify the correct patient, procedure, equipment, support staff and site/side marked as required   Supporting Documentation  Indications: pain   Procedure Details  Location: knee - L knee  Preparation: Patient was prepped and draped in the usual sterile fashion  Needle size: 22 G  Approach: anteromedial  Medications administered: 40 mg triamcinolone acetonide 40 MG/ML; 2 mL bupivacaine 0.5 %  Patient tolerance: patient tolerated the procedure well with no immediate complications          Return if symptoms worsen or fail to improve, for recheck.    Nathaniel Kate MD

## 2022-05-18 ENCOUNTER — TELEPHONE (OUTPATIENT)
Dept: ORTHOPEDIC SURGERY | Facility: CLINIC | Age: 67
End: 2022-05-18

## 2022-05-18 NOTE — TELEPHONE ENCOUNTER
Patient called requesting lubrications shots for GAUDENCIO knee     Please advise  Call back # [phone]

## 2022-06-03 ENCOUNTER — TELEPHONE (OUTPATIENT)
Dept: ORTHOPEDIC SURGERY | Facility: CLINIC | Age: 67
End: 2022-06-03

## 2022-06-03 NOTE — TELEPHONE ENCOUNTER
[facility]      Patient is wanting to schedule knee surgery.    Sam gilman   [phone]

## 2022-06-07 NOTE — TELEPHONE ENCOUNTER
Patient planning surgery for 9/19/22.  Follow up with Dr Kate on 8/23/22 at 8am.  Synvisc injection for 7-14-22 has been cancelled for the Right knee. We will inject the left knee only.

## 2022-07-14 ENCOUNTER — CLINICAL SUPPORT (OUTPATIENT)
Dept: ORTHOPEDIC SURGERY | Facility: CLINIC | Age: 67
End: 2022-07-14

## 2022-07-14 VITALS — HEIGHT: 66 IN | WEIGHT: 199 LBS | BODY MASS INDEX: 31.98 KG/M2

## 2022-07-14 DIAGNOSIS — M17.0 PRIMARY OSTEOARTHRITIS OF BOTH KNEES: Primary | ICD-10-CM

## 2022-07-14 DIAGNOSIS — M25.562 ACUTE PAIN OF LEFT KNEE: ICD-10-CM

## 2022-07-14 PROCEDURE — 20610 DRAIN/INJ JOINT/BURSA W/O US: CPT | Performed by: ORTHOPAEDIC SURGERY

## 2022-07-14 NOTE — PROGRESS NOTES
"Knee Joint Injection      Patient: Sam Infante    YOB: 1955    Chief Complaint   Patient presents with   • Left Knee - Follow-up       History of Present Illness:  Patient is here for an injection.  No new complaints.    Physical Exam: 66 y.o. male  General Appearance:    Alert, cooperative, in no acute distress                   Vitals:    07/14/22 0833   Weight: 90.3 kg (199 lb)   Height: 167.6 cm (66\")   PainSc:   4        Patient is alert and oriented, ×3 no acute distress.  Affect is normal respiratory rate is normal unlabored.  Exam and complaints are unchanged.    Procedure:  Large Joint Arthrocentesis: L knee  Date/Time: 7/14/2022 8:25 AM  Consent given by: patient  Site marked: site marked  Timeout: Immediately prior to procedure a time out was called to verify the correct patient, procedure, equipment, support staff and site/side marked as required   Supporting Documentation  Indications: pain   Procedure Details  Location: knee - L knee  Preparation: Patient was prepped and draped in the usual sterile fashion  Needle size: 22 G  Medications administered: 48 mg hylan 48 MG/6ML  Patient tolerance: patient tolerated the procedure well with no immediate complications          Assessment:    Diagnoses and all orders for this visit:    Primary osteoarthritis of both knees  -     Large Joint Arthrocentesis: L knee    Acute pain of left knee        Plan:   Slowly increase activity as tolerated.  Discussed importance of leg strengthening and general conditioning.  Discussed warning signs of injection.  Discussed that purpose of injections was symptom improvement and improved activity.  Also discussed that further treatment options depended on symptoms at followup and length of time of improvement after injections.    Return if symptoms worsen or fail to improve, for recheck.    Nathaniel Kate MD          "

## 2022-07-22 RX ORDER — MELOXICAM 15 MG/1
TABLET ORAL
Qty: 30 TABLET | Refills: 1 | Status: SHIPPED | OUTPATIENT
Start: 2022-07-22 | End: 2022-09-13

## 2022-08-15 DIAGNOSIS — M25.561 CHRONIC PAIN OF RIGHT KNEE: Primary | ICD-10-CM

## 2022-08-15 DIAGNOSIS — G89.29 CHRONIC PAIN OF RIGHT KNEE: Primary | ICD-10-CM

## 2022-08-23 ENCOUNTER — OFFICE VISIT (OUTPATIENT)
Dept: ORTHOPEDIC SURGERY | Facility: CLINIC | Age: 67
End: 2022-08-23

## 2022-08-23 VITALS — HEIGHT: 66 IN | BODY MASS INDEX: 32.3 KG/M2 | WEIGHT: 201 LBS

## 2022-08-23 DIAGNOSIS — G47.33 OSA (OBSTRUCTIVE SLEEP APNEA): ICD-10-CM

## 2022-08-23 DIAGNOSIS — I10 ESSENTIAL HYPERTENSION: ICD-10-CM

## 2022-08-23 DIAGNOSIS — M25.561 CHRONIC PAIN OF RIGHT KNEE: ICD-10-CM

## 2022-08-23 DIAGNOSIS — G89.29 CHRONIC PAIN OF RIGHT KNEE: ICD-10-CM

## 2022-08-23 DIAGNOSIS — K21.9 GASTROESOPHAGEAL REFLUX DISEASE WITHOUT ESOPHAGITIS: ICD-10-CM

## 2022-08-23 DIAGNOSIS — M17.11 PRIMARY OSTEOARTHRITIS OF RIGHT KNEE: Primary | ICD-10-CM

## 2022-08-23 PROCEDURE — 99214 OFFICE O/P EST MOD 30 MIN: CPT | Performed by: ORTHOPAEDIC SURGERY

## 2022-08-23 NOTE — PROGRESS NOTES
Sam Infante is a 66 y.o. male returns for     Chief Complaint   Patient presents with   • Right Knee - Follow-up, Pain       HISTORY OF PRESENT ILLNESS:    F/u right knee. xrays done today  He continues to have pain with activities of daily living.  He has dull achy pain.  He has tried intra-articular steroid injection, viscosupplementation, use of a cane, activity modification, as well as home exercises.  He has tried nonsteroidal anti-inflammatory medications with, all without significant improvement.  He is unhappy with his quality of life and wishes to discuss definitive treatment options.     CONCURRENT MEDICAL HISTORY:    Past Medical History:   Diagnosis Date   • Arthritis    • Elevated cholesterol    • GERD (gastroesophageal reflux disease)     OCCASIONAL   • High blood pressure    • Kidney stone    • Sleep apnea     using c-pap   • Vitreous floater 05/24/2013       Allergies   Allergen Reactions   • Statins Other (See Comments)     Muscle pain       Current Outpatient Medications on File Prior to Visit   Medication Sig   • latanoprost (XALATAN) 0.005 % ophthalmic solution Administer 1 drop to both eyes Every Night.   • meloxicam (MOBIC) 15 MG tablet TAKE 1 TABLET BY MOUTH EVERY DAY WITH FOOD   • metoprolol succinate XL (TOPROL-XL) 50 MG 24 hr tablet Take 50 mg by mouth Daily.   • Multiple Vitamins-Minerals (PRESERVISION AREDS PO) Take 2 tablets by mouth Daily.   • verapamil PM (VERELAN PM) 360 MG 24 hr capsule Take 360 mg by mouth daily.     No current facility-administered medications on file prior to visit.       Past Surgical History:   Procedure Laterality Date   • COLONOSCOPY     • RHINOPLASTY     • SEPTOPLASTY  12/18/2013    Nasal surgery procedure (Nasal septoplasty.)   • TOTAL HIP ARTHROPLASTY Right 10/14/2019    Procedure: TOTAL HIP ARTHROPLASTY ANTERIOR;  Surgeon: Nathaniel Kate MD;  Location: [facility];  Service: Orthopedics       Family History   Problem Relation Age of Onset  "  • Macular degeneration Mother    • Glaucoma Mother    • Heart disease Other    • Hypertension Other        Social History     Socioeconomic History   • Marital status:    Tobacco Use   • Smoking status: Former Smoker     Types: Cigarettes   • Smokeless tobacco: Never Used   Substance and Sexual Activity   • Alcohol use: Never   • Drug use: No   • Sexual activity: Defer            ROS  No fevers or chills.  No chest pain or shortness of air.  No GI or  disturbances.    PHYSICAL EXAMINATION:       Ht 167.6 cm (66\")   Wt 91.2 kg (201 lb)   BMI 32.44 kg/m²     Physical Exam  Vitals reviewed.   Constitutional:       General: He is not in acute distress.     Appearance: Normal appearance. He is well-developed.   Cardiovascular:      Rate and Rhythm: Normal rate and regular rhythm.      Heart sounds: Normal heart sounds.   Pulmonary:      Effort: Pulmonary effort is normal. No respiratory distress.      Breath sounds: Normal breath sounds.   Abdominal:      General: Bowel sounds are normal.      Palpations: Abdomen is soft.   Neurological:      Mental Status: He is alert and oriented to person, place, and time.   Psychiatric:         Behavior: Behavior normal.         Thought Content: Thought content normal.         Judgment: Judgment normal.         GAIT:     []  Normal  [x]  Antalgic    Assistive device: [x]  None  []  Walker     []  Crutches  []  Cane     []  Wheelchair  []  Stretcher    Right Knee Exam     Muscle Strength   The patient has normal right knee strength.    Tenderness   Right knee tenderness location: diffuse.    Range of Motion   Extension: -5   Flexion: 110     Tests   Varus: negative Valgus: negative  Drawer:  Anterior - negative    Posterior - negative    Other   Sensation: normal  Pulse: present  Swelling: mild    Comments:  Crepitation on motion.  Mild to moderate pain through arc of motion                XR Knee 1 or 2 View Right    Result Date: 8/24/2022  Narrative: Ordering Provider:  " Nathaniel Kate MD Ordering Diagnosis/Indication:  Chronic pain of right knee Procedure:  XR KNEE 1 OR 2 VW RIGHT Exam Date:  8/23/22 COMPARISON:  Todays X-rays were compared to previous images dated November 19, 2021.     Impression:  AP bilateral standing of the knees with lateral of the right knee show significant varus positioning in the right knee with 95% medial joint space collapse and lateral subluxation of the tibia.  There is marginal osteophyte along the medial aspect of the femur.  Left knee shows mild to moderate varus positioning with approximately 70% medial joint space collapse.  Interval worsening of the varus positioning and medial joint space collapse is noted in comparison to prior x-ray.  Moderate arthritic change noted in the patellofemoral compartment on the lateral view.  End stage osteoarthritis changes in the right knee with moderate arthritic changes in the left knee. Nathaniel Kate MD 8/23/22     XR Knee Bilateral AP Standing    Result Date: 8/24/2022  Narrative: Ordering Provider:  Nathaniel Kate MD Ordering Diagnosis/Indication:  Chronic pain of right knee Procedure:  XR KNEE BILATERAL AP STANDING Exam Date:  8/23/22 COMPARISON:  Todays X-rays were compared to previous images dated November 19, 2021.     Impression:  AP bilateral standing of the knees with lateral of the right knee show significant varus positioning in the right knee with 95% medial joint space collapse and lateral subluxation of the tibia.  There is marginal osteophyte along the medial aspect of the femur.  Left knee shows mild to moderate varus positioning with approximately 70% medial joint space collapse.  Interval worsening of the varus positioning and medial joint space collapse is noted in comparison to prior x-ray.  Moderate arthritic change noted in the patellofemoral compartment on the lateral view.  End stage osteoarthritis changes in the right knee with moderate arthritic changes in  the left knee. Nathaniel Kate MD 8/23/22     XR Spine Lumbar Complete 4+VW    Result Date: 8/4/2022  Narrative: LUMBAR SPINE FIVE VIEWS Indication: Back pain. Comparison: 7/5/2017. Findings: There is stable mild L5-S1 anterolisthesis. Alignment is otherwise anatomic. There are small multilevel endplate osteophytes. There is multilevel facet joint narrowing, and the L5-S1 facets are overgrown. Little change is noted.    Impression:   Chronic degenerative changes with no acute findings. [id number]            ASSESSMENT:    Diagnoses and all orders for this visit:    Primary osteoarthritis of right knee  -     Case Request; Standing  -     MRSA Screen, PCR (Inpatient) - Swab, Nares; Future  -     Type and screen; Future  -     Case Request    Chronic pain of right knee  -     Case Request; Standing  -     MRSA Screen, PCR (Inpatient) - Swab, Nares; Future  -     Type and screen; Future  -     Case Request    Essential hypertension  -     Case Request; Standing  -     MRSA Screen, PCR (Inpatient) - Swab, Nares; Future  -     Type and screen; Future  -     Case Request    Gastroesophageal reflux disease without esophagitis  -     Case Request; Standing  -     MRSA Screen, PCR (Inpatient) - Swab, Nares; Future  -     Type and screen; Future  -     Case Request    BERNADETTE (obstructive sleep apnea)  -     Case Request; Standing  -     MRSA Screen, PCR (Inpatient) - Swab, Nares; Future  -     Type and screen; Future  -     Case Request    Other orders  -     Follow Anesthesia Guidelines / Standing Orders; Future  -     Provide instructions to patient regarding NPO status  -     Chlorhexidine Skin Prep - Educate and Review With Patient; Future  -     Provide Patient With ERAS Hydration Instructions  -     Complete A PROMIS And HOOS Or KOOS Survey If Having Hip Or Knee Replacement          PLAN    Long discussion was had with the patient regarding further treatment options.  He has tried and failed nonoperative  treatment for osteoarthritis of the right knee.  He has pain with activities of daily living and is unhappy with his quality of life.  He wishes to discuss definitive treatment options.    Plan right total knee arthroplasty with adductor canal block    We discussed possible same-day discharge.    Return for Post-operative eval.    Nathaniel Kate MD

## 2022-08-27 RX ORDER — PREGABALIN 75 MG/1
75 CAPSULE ORAL ONCE
Status: CANCELLED | OUTPATIENT
Start: 2022-09-19 | End: 2022-08-27

## 2022-08-27 RX ORDER — ACETAMINOPHEN 500 MG
1000 TABLET ORAL ONCE
Status: CANCELLED | OUTPATIENT
Start: 2022-09-19 | End: 2022-08-27

## 2022-08-27 RX ORDER — MELOXICAM 15 MG/1
15 TABLET ORAL ONCE
Status: CANCELLED | OUTPATIENT
Start: 2022-09-19 | End: 2022-08-27

## 2022-08-27 RX ORDER — BUPIVACAINE HCL/0.9 % NACL/PF 0.1 %
2 PLASTIC BAG, INJECTION (ML) EPIDURAL ONCE
Status: CANCELLED | OUTPATIENT
Start: 2022-09-19 | End: 2022-08-27

## 2022-09-13 ENCOUNTER — PRE-ADMISSION TESTING (OUTPATIENT)
Dept: PREADMISSION TESTING | Facility: HOSPITAL | Age: 67
End: 2022-09-13

## 2022-09-13 VITALS
DIASTOLIC BLOOD PRESSURE: 82 MMHG | SYSTOLIC BLOOD PRESSURE: 172 MMHG | HEART RATE: 70 BPM | OXYGEN SATURATION: 96 % | WEIGHT: 203 LBS | BODY MASS INDEX: 32.62 KG/M2 | RESPIRATION RATE: 18 BRPM | HEIGHT: 66 IN

## 2022-09-13 DIAGNOSIS — G89.29 CHRONIC PAIN OF RIGHT KNEE: ICD-10-CM

## 2022-09-13 DIAGNOSIS — K21.9 GASTROESOPHAGEAL REFLUX DISEASE WITHOUT ESOPHAGITIS: ICD-10-CM

## 2022-09-13 DIAGNOSIS — G47.33 OSA (OBSTRUCTIVE SLEEP APNEA): ICD-10-CM

## 2022-09-13 DIAGNOSIS — I10 ESSENTIAL HYPERTENSION: ICD-10-CM

## 2022-09-13 DIAGNOSIS — M25.561 CHRONIC PAIN OF RIGHT KNEE: ICD-10-CM

## 2022-09-13 DIAGNOSIS — M17.11 PRIMARY OSTEOARTHRITIS OF RIGHT KNEE: ICD-10-CM

## 2022-09-13 LAB
ABO GROUP BLD: NORMAL
BLD GP AB SCN SERPL QL: NEGATIVE
DEPRECATED RDW RBC AUTO: 43.4 FL (ref 37–54)
ERYTHROCYTE [DISTWIDTH] IN BLOOD BY AUTOMATED COUNT: 14.1 % (ref 12.3–15.4)
HCT VFR BLD AUTO: 43.7 % (ref 37.5–51)
HGB BLD-MCNC: 15 G/DL (ref 13–17.7)
Lab: NORMAL
MCH RBC QN AUTO: 29.1 PG (ref 26.6–33)
MCHC RBC AUTO-ENTMCNC: 34.3 G/DL (ref 31.5–35.7)
MCV RBC AUTO: 84.9 FL (ref 79–97)
MRSA DNA SPEC QL NAA+PROBE: NEGATIVE
PLATELET # BLD AUTO: 177 10*3/MM3 (ref 140–450)
PMV BLD AUTO: 11.4 FL (ref 6–12)
QT INTERVAL: 460 MS
QTC INTERVAL: 482 MS
RBC # BLD AUTO: 5.15 10*6/MM3 (ref 4.14–5.8)
RH BLD: NEGATIVE
T&S EXPIRATION DATE: NORMAL
WBC NRBC COR # BLD: 6.34 10*3/MM3 (ref 3.4–10.8)

## 2022-09-13 PROCEDURE — 93005 ELECTROCARDIOGRAM TRACING: CPT

## 2022-09-13 PROCEDURE — 93010 ELECTROCARDIOGRAM REPORT: CPT | Performed by: INTERNAL MEDICINE

## 2022-09-13 PROCEDURE — 87641 MR-STAPH DNA AMP PROBE: CPT

## 2022-09-13 PROCEDURE — 36415 COLL VENOUS BLD VENIPUNCTURE: CPT

## 2022-09-13 PROCEDURE — 86901 BLOOD TYPING SEROLOGIC RH(D): CPT

## 2022-09-13 PROCEDURE — 86850 RBC ANTIBODY SCREEN: CPT

## 2022-09-13 PROCEDURE — 85027 COMPLETE CBC AUTOMATED: CPT

## 2022-09-13 PROCEDURE — 86900 BLOOD TYPING SEROLOGIC ABO: CPT

## 2022-09-13 RX ORDER — SODIUM CHLORIDE, SODIUM GLUCONATE, SODIUM ACETATE, POTASSIUM CHLORIDE AND MAGNESIUM CHLORIDE 526; 502; 368; 37; 30 MG/100ML; MG/100ML; MG/100ML; MG/100ML; MG/100ML
1000 INJECTION, SOLUTION INTRAVENOUS CONTINUOUS PRN
Status: CANCELLED | OUTPATIENT
Start: 2022-09-19

## 2022-09-13 RX ORDER — METOPROLOL TARTRATE 50 MG/1
50 TABLET, FILM COATED ORAL 2 TIMES DAILY
COMMUNITY

## 2022-09-13 RX ORDER — MELOXICAM 15 MG/1
15 TABLET ORAL DAILY
COMMUNITY
End: 2022-09-19 | Stop reason: HOSPADM

## 2022-09-13 ASSESSMENT — KOOS JR
KOOS JR SCORE: 10
KOOS JR SCORE: 61.583

## 2022-09-13 NOTE — DISCHARGE INSTRUCTIONS
[facility]  Pre-op Information and Guidelines    You will be called after 2 p.m. the day before your surgery (Friday for Monday surgery) and notified of your time for arrival and approximate surgery time.  If you have not received a call by 4P.M., please contact Same Day Surgery at [phone] of if outside [facility] call [phone].    Please Follow these Important Safety Guidelines:    The morning of your procedure, take only the medications listed below with   A sip of water:_____________________________________________       ______________________________________________    DO NOT eat or drink anything after 12:00 midnight the night before surgery  Specific instructions concerning drinking clear liquids will be discussed during  the pre-surgery instruction call the day before your surgery.    If you take a blood thinner (ex. Plavix, Coumadin, aspirin), ask your doctor when to stop it before surgery  STOP DATE: _________________    Only 2 visitors are allowed in patient rooms at a time  Your visitors will be asked to wait in the lobby until the admission process is complete with the exception of a parent with a child and patients in need of special assistance.    YOU CANNOT DRIVE YOURSELF HOME  You must be accompanied by someone who will be responsible for driving you home after surgery and for your care at home.    DO NOT chew gum, use breath mints, hard candy, or smoke the day of surgery  DO NOT drink alcohol for at least 24 hours before your surgery  DO NOT wear any jewelry and remove all body piercing before coming to the hospital  DO NOT wear make-up to the hospital  If you are having surgery on an extremity (arm/leg/foot) remove nail polish/artificial nails on the surgical side  Clothing, glasses, contacts, dentures, and hairpieces must be removed before surgery  Bathe the night before or the morning of your surgery and do not use powders/lotions on skin.    Nothing  solid/dairy after midnight. Can have clear liquids up until 3 hours prior to procedure time. Drink 20 ounces of Gatorade prior to procedure and complete by 3 hours prior to procedure start time.

## 2022-09-18 ENCOUNTER — ANESTHESIA EVENT (OUTPATIENT)
Dept: PERIOP | Facility: HOSPITAL | Age: 67
End: 2022-09-18

## 2022-09-19 ENCOUNTER — APPOINTMENT (OUTPATIENT)
Dept: GENERAL RADIOLOGY | Facility: HOSPITAL | Age: 67
End: 2022-09-19

## 2022-09-19 ENCOUNTER — HOSPITAL ENCOUNTER (OUTPATIENT)
Facility: HOSPITAL | Age: 67
Setting detail: SURGERY ADMIT
Discharge: HOME | End: 2022-09-19
Attending: ORTHOPAEDIC SURGERY | Admitting: ORTHOPAEDIC SURGERY

## 2022-09-19 ENCOUNTER — ANESTHESIA (OUTPATIENT)
Dept: PERIOP | Facility: HOSPITAL | Age: 67
End: 2022-09-19

## 2022-09-19 VITALS
BODY MASS INDEX: 32.17 KG/M2 | DIASTOLIC BLOOD PRESSURE: 84 MMHG | SYSTOLIC BLOOD PRESSURE: 170 MMHG | HEIGHT: 66 IN | HEART RATE: 51 BPM | TEMPERATURE: 97 F | WEIGHT: 200.18 LBS | RESPIRATION RATE: 18 BRPM | OXYGEN SATURATION: 97 %

## 2022-09-19 DIAGNOSIS — Z96.641 PRESENCE OF RIGHT HIP IMPLANT: ICD-10-CM

## 2022-09-19 DIAGNOSIS — I10 ESSENTIAL HYPERTENSION: ICD-10-CM

## 2022-09-19 DIAGNOSIS — Z74.09 IMPAIRED FUNCTIONAL MOBILITY, BALANCE, GAIT, AND ENDURANCE: ICD-10-CM

## 2022-09-19 DIAGNOSIS — Z78.9 IMPAIRED MOBILITY AND ACTIVITIES OF DAILY LIVING: ICD-10-CM

## 2022-09-19 DIAGNOSIS — G89.29 CHRONIC PAIN OF RIGHT KNEE: ICD-10-CM

## 2022-09-19 DIAGNOSIS — M17.11 PRIMARY OSTEOARTHRITIS OF RIGHT KNEE: ICD-10-CM

## 2022-09-19 DIAGNOSIS — M17.0 PRIMARY OSTEOARTHRITIS OF BOTH KNEES: Primary | ICD-10-CM

## 2022-09-19 DIAGNOSIS — M25.561 CHRONIC PAIN OF RIGHT KNEE: ICD-10-CM

## 2022-09-19 DIAGNOSIS — G47.33 OSA (OBSTRUCTIVE SLEEP APNEA): ICD-10-CM

## 2022-09-19 DIAGNOSIS — Z74.09 IMPAIRED MOBILITY AND ACTIVITIES OF DAILY LIVING: ICD-10-CM

## 2022-09-19 DIAGNOSIS — K21.9 GASTROESOPHAGEAL REFLUX DISEASE WITHOUT ESOPHAGITIS: ICD-10-CM

## 2022-09-19 LAB
ABO GROUP BLD: NORMAL
BLD GP AB SCN SERPL QL: NEGATIVE
HCT VFR BLD AUTO: 41.6 % (ref 37.5–51)
HGB BLD-MCNC: 14.4 G/DL (ref 13–17.7)
Lab: NORMAL
RH BLD: NEGATIVE
T&S EXPIRATION DATE: NORMAL

## 2022-09-19 PROCEDURE — 25010000002 ONDANSETRON PER 1 MG: Performed by: NURSE ANESTHETIST, CERTIFIED REGISTERED

## 2022-09-19 PROCEDURE — 97166 OT EVAL MOD COMPLEX 45 MIN: CPT

## 2022-09-19 PROCEDURE — C1776 JOINT DEVICE (IMPLANTABLE): HCPCS | Performed by: ORTHOPAEDIC SURGERY

## 2022-09-19 PROCEDURE — 0 BUPIVACAINE LIPOSOME 1.3 % SUSPENSION: Performed by: ORTHOPAEDIC SURGERY

## 2022-09-19 PROCEDURE — 88300 SURGICAL PATH GROSS: CPT

## 2022-09-19 PROCEDURE — C9290 INJ, BUPIVACAINE LIPOSOME: HCPCS | Performed by: ORTHOPAEDIC SURGERY

## 2022-09-19 PROCEDURE — 25010000002 ROPIVACAINE PER 1 MG: Performed by: ANESTHESIOLOGY

## 2022-09-19 PROCEDURE — 86901 BLOOD TYPING SEROLOGIC RH(D): CPT | Performed by: ANESTHESIOLOGY

## 2022-09-19 PROCEDURE — 25010000002 MIDAZOLAM PER 1 MG: Performed by: NURSE ANESTHETIST, CERTIFIED REGISTERED

## 2022-09-19 PROCEDURE — 97162 PT EVAL MOD COMPLEX 30 MIN: CPT

## 2022-09-19 PROCEDURE — 97116 GAIT TRAINING THERAPY: CPT

## 2022-09-19 PROCEDURE — 86850 RBC ANTIBODY SCREEN: CPT | Performed by: ANESTHESIOLOGY

## 2022-09-19 PROCEDURE — P9612 CATHETERIZE FOR URINE SPEC: HCPCS

## 2022-09-19 PROCEDURE — 85014 HEMATOCRIT: CPT | Performed by: ORTHOPAEDIC SURGERY

## 2022-09-19 PROCEDURE — 27447 TOTAL KNEE ARTHROPLASTY: CPT | Performed by: SPECIALIST/TECHNOLOGIST, OTHER

## 2022-09-19 PROCEDURE — 25010000002 PROPOFOL 10 MG/ML EMULSION: Performed by: NURSE ANESTHETIST, CERTIFIED REGISTERED

## 2022-09-19 PROCEDURE — 76942 ECHO GUIDE FOR BIOPSY: CPT | Performed by: ORTHOPAEDIC SURGERY

## 2022-09-19 PROCEDURE — 73560 X-RAY EXAM OF KNEE 1 OR 2: CPT

## 2022-09-19 PROCEDURE — 25010000002 CEFAZOLIN PER 500 MG: Performed by: ORTHOPAEDIC SURGERY

## 2022-09-19 PROCEDURE — 85018 HEMOGLOBIN: CPT | Performed by: ORTHOPAEDIC SURGERY

## 2022-09-19 PROCEDURE — 27447 TOTAL KNEE ARTHROPLASTY: CPT | Performed by: ORTHOPAEDIC SURGERY

## 2022-09-19 PROCEDURE — 86900 BLOOD TYPING SEROLOGIC ABO: CPT | Performed by: ANESTHESIOLOGY

## 2022-09-19 PROCEDURE — C1713 ANCHOR/SCREW BN/BN,TIS/BN: HCPCS | Performed by: ORTHOPAEDIC SURGERY

## 2022-09-19 DEVICE — SMARTSET HV HIGH VISCOSITY BONE CEMENT 40G
Type: IMPLANTABLE DEVICE | Site: KNEE | Status: FUNCTIONAL
Brand: SMARTSET

## 2022-09-19 DEVICE — ATTUNE PINNING SYSTEM
Type: IMPLANTABLE DEVICE | Site: KNEE | Status: FUNCTIONAL
Brand: ATTUNE

## 2022-09-19 DEVICE — TOTL KN ATTUNE DEPUY 9527038: Type: IMPLANTABLE DEVICE | Site: KNEE | Status: FUNCTIONAL

## 2022-09-19 DEVICE — ATTUNE PATELLA MEDIALIZED DOME 32MM CEMENTED AOX
Type: IMPLANTABLE DEVICE | Site: KNEE | Status: FUNCTIONAL
Brand: ATTUNE

## 2022-09-19 DEVICE — ATTUNE KNEE SYSTEM TIBIAL INSERT ROTATING PLATFORM POSTERIOR STABILIZED 5 6MM AOX
Type: IMPLANTABLE DEVICE | Site: KNEE | Status: FUNCTIONAL
Brand: ATTUNE

## 2022-09-19 DEVICE — ATTUNE KNEE SYSTEM TIBIAL BASE ROTATING PLATFORM SIZE 5 CEMENTED
Type: IMPLANTABLE DEVICE | Site: KNEE | Status: FUNCTIONAL
Brand: ATTUNE

## 2022-09-19 DEVICE — ATTUNE KNEE SYSTEM FEMORAL POSTERIOR STABILIZED SIZE 5 RIGHT CEMENTED
Type: IMPLANTABLE DEVICE | Site: KNEE | Status: FUNCTIONAL
Brand: ATTUNE

## 2022-09-19 RX ORDER — BUPIVACAINE HCL/0.9 % NACL/PF 0.1 %
2 PLASTIC BAG, INJECTION (ML) EPIDURAL EVERY 8 HOURS
Status: DISCONTINUED | OUTPATIENT
Start: 2022-09-19 | End: 2022-09-19 | Stop reason: HOSPADM

## 2022-09-19 RX ORDER — SODIUM CHLORIDE 0.9 % (FLUSH) 0.9 %
3 SYRINGE (ML) INJECTION EVERY 12 HOURS SCHEDULED
Status: DISCONTINUED | OUTPATIENT
Start: 2022-09-19 | End: 2022-09-19 | Stop reason: HOSPADM

## 2022-09-19 RX ORDER — 0.9 % SODIUM CHLORIDE 0.9 %
VIAL (ML) INJECTION AS NEEDED
Status: DISCONTINUED | OUTPATIENT
Start: 2022-09-19 | End: 2022-09-19 | Stop reason: HOSPADM

## 2022-09-19 RX ORDER — PREGABALIN 75 MG/1
75 CAPSULE ORAL ONCE
Status: COMPLETED | OUTPATIENT
Start: 2022-09-19 | End: 2022-09-19

## 2022-09-19 RX ORDER — SODIUM CHLORIDE, SODIUM GLUCONATE, SODIUM ACETATE, POTASSIUM CHLORIDE AND MAGNESIUM CHLORIDE 526; 502; 368; 37; 30 MG/100ML; MG/100ML; MG/100ML; MG/100ML; MG/100ML
1000 INJECTION, SOLUTION INTRAVENOUS CONTINUOUS PRN
Status: DISCONTINUED | OUTPATIENT
Start: 2022-09-19 | End: 2022-09-19 | Stop reason: HOSPADM

## 2022-09-19 RX ORDER — METOPROLOL TARTRATE 50 MG/1
50 TABLET, FILM COATED ORAL 2 TIMES DAILY
Status: DISCONTINUED | OUTPATIENT
Start: 2022-09-19 | End: 2022-09-19 | Stop reason: HOSPADM

## 2022-09-19 RX ORDER — OXYCODONE HYDROCHLORIDE 5 MG/1
5 TABLET ORAL EVERY 4 HOURS PRN
Status: DISCONTINUED | OUTPATIENT
Start: 2022-09-19 | End: 2022-09-19 | Stop reason: HOSPADM

## 2022-09-19 RX ORDER — BUPIVACAINE HYDROCHLORIDE 2.5 MG/ML
INJECTION, SOLUTION EPIDURAL; INFILTRATION; INTRACAUDAL AS NEEDED
Status: DISCONTINUED | OUTPATIENT
Start: 2022-09-19 | End: 2022-09-19 | Stop reason: HOSPADM

## 2022-09-19 RX ORDER — MIDAZOLAM HYDROCHLORIDE 1 MG/ML
INJECTION INTRAMUSCULAR; INTRAVENOUS AS NEEDED
Status: DISCONTINUED | OUTPATIENT
Start: 2022-09-19 | End: 2022-09-19 | Stop reason: SURG

## 2022-09-19 RX ORDER — OXYCODONE AND ACETAMINOPHEN 7.5; 325 MG/1; MG/1
1 TABLET ORAL EVERY 6 HOURS PRN
Qty: 30 TABLET | Refills: 0 | Status: SHIPPED | OUTPATIENT
Start: 2022-09-19 | End: 2022-10-04 | Stop reason: SDUPTHER

## 2022-09-19 RX ORDER — FAMOTIDINE 40 MG/1
40 TABLET, FILM COATED ORAL DAILY
Status: DISCONTINUED | OUTPATIENT
Start: 2022-09-19 | End: 2022-09-19 | Stop reason: HOSPADM

## 2022-09-19 RX ORDER — FERROUS SULFATE TAB EC 324 MG (65 MG FE EQUIVALENT) 324 (65 FE) MG
324 TABLET DELAYED RESPONSE ORAL
Qty: 30 TABLET | Refills: 0 | Status: SHIPPED | OUTPATIENT
Start: 2022-09-20 | End: 2022-12-27

## 2022-09-19 RX ORDER — NALOXONE HCL 0.4 MG/ML
0.1 VIAL (ML) INJECTION
Status: DISCONTINUED | OUTPATIENT
Start: 2022-09-19 | End: 2022-09-19 | Stop reason: HOSPADM

## 2022-09-19 RX ORDER — ACETAMINOPHEN 500 MG
1000 TABLET ORAL ONCE
Status: COMPLETED | OUTPATIENT
Start: 2022-09-19 | End: 2022-09-19

## 2022-09-19 RX ORDER — BUPIVACAINE HYDROCHLORIDE 7.5 MG/ML
INJECTION, SOLUTION EPIDURAL; RETROBULBAR
Status: COMPLETED | OUTPATIENT
Start: 2022-09-19 | End: 2022-09-19

## 2022-09-19 RX ORDER — MEPERIDINE HYDROCHLORIDE 25 MG/ML
12.5 INJECTION INTRAMUSCULAR; INTRAVENOUS; SUBCUTANEOUS
Status: DISCONTINUED | OUTPATIENT
Start: 2022-09-19 | End: 2022-09-19 | Stop reason: HOSPADM

## 2022-09-19 RX ORDER — ACETAMINOPHEN 500 MG
1000 TABLET ORAL 4 TIMES DAILY
Status: DISCONTINUED | OUTPATIENT
Start: 2022-09-19 | End: 2022-09-19 | Stop reason: HOSPADM

## 2022-09-19 RX ORDER — LIDOCAINE HYDROCHLORIDE 10 MG/ML
INJECTION, SOLUTION EPIDURAL; INFILTRATION; INTRACAUDAL; PERINEURAL
Status: COMPLETED | OUTPATIENT
Start: 2022-09-19 | End: 2022-09-19

## 2022-09-19 RX ORDER — MELOXICAM 15 MG/1
15 TABLET ORAL ONCE
Status: COMPLETED | OUTPATIENT
Start: 2022-09-19 | End: 2022-09-19

## 2022-09-19 RX ORDER — ROPIVACAINE HYDROCHLORIDE 5 MG/ML
INJECTION, SOLUTION EPIDURAL; INFILTRATION; PERINEURAL
Status: COMPLETED | OUTPATIENT
Start: 2022-09-19 | End: 2022-09-19

## 2022-09-19 RX ORDER — MULTIPLE VITAMINS W/ MINERALS TAB 9MG-400MCG
1 TAB ORAL 2 TIMES DAILY
Status: DISCONTINUED | OUTPATIENT
Start: 2022-09-19 | End: 2022-09-19 | Stop reason: HOSPADM

## 2022-09-19 RX ORDER — FERROUS SULFATE TAB EC 324 MG (65 MG FE EQUIVALENT) 324 (65 FE) MG
324 TABLET DELAYED RESPONSE ORAL
Status: DISCONTINUED | OUTPATIENT
Start: 2022-09-20 | End: 2022-09-19 | Stop reason: HOSPADM

## 2022-09-19 RX ORDER — PROPOFOL 10 MG/ML
VIAL (ML) INTRAVENOUS AS NEEDED
Status: DISCONTINUED | OUTPATIENT
Start: 2022-09-19 | End: 2022-09-19 | Stop reason: SURG

## 2022-09-19 RX ORDER — LATANOPROST 50 UG/ML
1 SOLUTION/ DROPS OPHTHALMIC NIGHTLY
Status: DISCONTINUED | OUTPATIENT
Start: 2022-09-19 | End: 2022-09-19 | Stop reason: HOSPADM

## 2022-09-19 RX ORDER — AMOXICILLIN 250 MG
2 CAPSULE ORAL NIGHTLY
Status: DISCONTINUED | OUTPATIENT
Start: 2022-09-19 | End: 2022-09-19 | Stop reason: HOSPADM

## 2022-09-19 RX ORDER — ONDANSETRON 2 MG/ML
4 INJECTION INTRAMUSCULAR; INTRAVENOUS EVERY 6 HOURS PRN
Status: DISCONTINUED | OUTPATIENT
Start: 2022-09-19 | End: 2022-09-19 | Stop reason: HOSPADM

## 2022-09-19 RX ORDER — SODIUM CHLORIDE 0.9 % (FLUSH) 0.9 %
3-10 SYRINGE (ML) INJECTION AS NEEDED
Status: DISCONTINUED | OUTPATIENT
Start: 2022-09-19 | End: 2022-09-19 | Stop reason: HOSPADM

## 2022-09-19 RX ORDER — AMOXICILLIN 250 MG
2 CAPSULE ORAL NIGHTLY
Qty: 40 TABLET | Refills: 0 | Status: SHIPPED | OUTPATIENT
Start: 2022-09-19 | End: 2022-10-09

## 2022-09-19 RX ORDER — ONDANSETRON 4 MG/1
4 TABLET, FILM COATED ORAL EVERY 6 HOURS PRN
Status: DISCONTINUED | OUTPATIENT
Start: 2022-09-19 | End: 2022-09-19 | Stop reason: HOSPADM

## 2022-09-19 RX ORDER — BUPIVACAINE HCL/0.9 % NACL/PF 0.1 %
2 PLASTIC BAG, INJECTION (ML) EPIDURAL ONCE
Status: COMPLETED | OUTPATIENT
Start: 2022-09-19 | End: 2022-09-19

## 2022-09-19 RX ORDER — ONDANSETRON 2 MG/ML
INJECTION INTRAMUSCULAR; INTRAVENOUS AS NEEDED
Status: DISCONTINUED | OUTPATIENT
Start: 2022-09-19 | End: 2022-09-19 | Stop reason: SURG

## 2022-09-19 RX ORDER — SODIUM CHLORIDE 9 MG/ML
100 INJECTION, SOLUTION INTRAVENOUS CONTINUOUS
Status: DISCONTINUED | OUTPATIENT
Start: 2022-09-19 | End: 2022-09-19 | Stop reason: HOSPADM

## 2022-09-19 RX ADMIN — MIDAZOLAM HYDROCHLORIDE 2 MG: 1 INJECTION, SOLUTION INTRAMUSCULAR; INTRAVENOUS at 10:50

## 2022-09-19 RX ADMIN — MELOXICAM 15 MG: 15 TABLET ORAL at 08:14

## 2022-09-19 RX ADMIN — ACETAMINOPHEN 1000 MG: 500 TABLET, FILM COATED ORAL at 08:14

## 2022-09-19 RX ADMIN — TRANEXAMIC ACID 1000 MG: 100 INJECTION, SOLUTION INTRAVENOUS at 12:34

## 2022-09-19 RX ADMIN — ONDANSETRON 4 MG: 2 INJECTION INTRAMUSCULAR; INTRAVENOUS at 12:34

## 2022-09-19 RX ADMIN — Medication 2 G: at 11:05

## 2022-09-19 RX ADMIN — ROPIVACAINE HYDROCHLORIDE 30 ML: 5 INJECTION, SOLUTION EPIDURAL; INFILTRATION; PERINEURAL at 09:03

## 2022-09-19 RX ADMIN — PROPOFOL 50 MCG/KG/MIN: 10 INJECTION, EMULSION INTRAVENOUS at 11:03

## 2022-09-19 RX ADMIN — LIDOCAINE HYDROCHLORIDE 30 MG: 10 INJECTION, SOLUTION EPIDURAL; INFILTRATION; INTRACAUDAL; PERINEURAL at 09:03

## 2022-09-19 RX ADMIN — PROPOFOL 30 MG: 10 INJECTION, EMULSION INTRAVENOUS at 11:03

## 2022-09-19 RX ADMIN — TRANEXAMIC ACID 1000 MG: 100 INJECTION, SOLUTION INTRAVENOUS at 08:13

## 2022-09-19 RX ADMIN — SODIUM CHLORIDE, SODIUM GLUCONATE, SODIUM ACETATE, POTASSIUM CHLORIDE AND MAGNESIUM CHLORIDE 1000 ML: 526; 502; 368; 37; 30 INJECTION, SOLUTION INTRAVENOUS at 08:12

## 2022-09-19 RX ADMIN — MIDAZOLAM HYDROCHLORIDE 2 MG: 1 INJECTION, SOLUTION INTRAMUSCULAR; INTRAVENOUS at 11:11

## 2022-09-19 RX ADMIN — BUPIVACAINE HYDROCHLORIDE 2 ML: 7.5 INJECTION, SOLUTION EPIDURAL; RETROBULBAR at 11:01

## 2022-09-19 RX ADMIN — PREGABALIN 75 MG: 75 CAPSULE ORAL at 08:14

## 2022-09-19 RX ADMIN — ACETAMINOPHEN 1000 MG: 500 TABLET, FILM COATED ORAL at 14:37

## 2022-09-19 NOTE — DISCHARGE INSTR - APPOINTMENTS
******[facility] WILL CALL YOU IN AM FOR PHYSICAL THERAPY APPT. [phone]**** IF YOU DO NOT HERE ANYTHING BY TOMORROW AFTERNOON, PLEASE CALL THEM.

## 2022-09-19 NOTE — THERAPY TREATMENT NOTE
Patient Name: Sam Infante  : 1955    MRN: [id number]                              Today's Date: 2022       Admit Date: 2022    Visit Dx:     ICD-10-CM ICD-9-CM   1. Essential hypertension  I10 401.9   2. BERNADETTE (obstructive sleep apnea)  G47.33 327.23   3. Gastroesophageal reflux disease without esophagitis  K21.9 530.81   4. Primary osteoarthritis of right knee  M17.11 715.16   5. Chronic pain of right knee  M25.561 719.46    G89.29 338.29   6. Impaired functional mobility, balance, gait, and endurance  Z74.09 V49.89     Patient Active Problem List   Diagnosis   • Borderline glaucoma, open angle with borderline findings   • Borderline glaucoma   • Left hand pain   • Bilateral carpal tunnel syndrome   • Right hip pain   • Primary osteoarthritis of right hip   • Essential hypertension   • Attention deficit hyperactivity disorder   • Benign prostatic hyperplasia   • Fatigue   • Gastroesophageal reflux disease without esophagitis   • Hyperlipidemia   • Mixed anxiety and depressive disorder   • BERNADETTE (obstructive sleep apnea)   • Routine physical examination   • Hx of total hip arthroplasty, right   • Right shoulder pain   • Rotator cuff impingement syndrome of right shoulder   • Impingement syndrome of right shoulder   • Presence of right hip implant   • Primary osteoarthritis of both knees   • Primary osteoarthritis of right knee   • Chronic pain of right knee     Past Medical History:   Diagnosis Date   • Arthritis    • Elevated cholesterol    • GERD (gastroesophageal reflux disease)     OCCASIONAL   • High blood pressure    • Kidney stone    • Sleep apnea     using c-pap   • Vitreous floater 2013     Past Surgical History:   Procedure Laterality Date   • COLONOSCOPY     • SEPTOPLASTY  2013    Nasal surgery procedure (Nasal septoplasty.)   • TOTAL HIP ARTHROPLASTY Right 10/14/2019    Procedure: TOTAL HIP ARTHROPLASTY ANTERIOR;  Surgeon: Nathaniel Kate MD;  Location: [facility], [place]  OR;  Service: Orthopedics      General Information     Row Name 09/19/22 1445 09/19/22 1425       Physical Therapy Time and Intention    Document Type evaluation  -LR therapy note (daily note)  -LR    Mode of Treatment individual therapy;physical therapy  -LR individual therapy;physical therapy  -LR    Row Name 09/19/22 1445 09/19/22 1425       General Information    Patient Profile Reviewed yes  -LR yes  -LR    Prior Level of Function independent:;all household mobility;community mobility;gait;transfer;bed mobility;ADL's;driving  -LR --    Existing Precautions/Restrictions -- fall  -LR    Row Name 09/19/22 1445          Living Environment    People in Home spouse  -LR     Row Name 09/19/22 1445          Home Main Entrance    Number of Stairs, Main Entrance one  -LR     Stair Railings, Main Entrance none  -LR     Row Name 09/19/22 1445          Stairs Within Home, Primary    Stairs, Within Home, Primary Walkin shower, shower chair, RW  -LR     Number of Stairs, Within Home, Primary other (see comments)  -LR     Stairs Comment, Within Home, Primary 3 story house but can live on second floor which is ground level  -LR     Row Name 09/19/22 1445 09/19/22 1425       Cognition    Orientation Status (Cognition) oriented x 4  -LR oriented x 4  -LR    Row Name 09/19/22 1445          Safety Issues, Functional Mobility    Safety Issues Affecting Function (Mobility) insight into deficits/self-awareness;safety precautions follow-through/compliance;safety precaution awareness  -LR     Impairments Affecting Function (Mobility) balance;endurance/activity tolerance;pain  -LR           User Key  (r) = Recorded By, (t) = Taken By, (c) = Cosigned By    Initials Name Provider Type    LR Chris Bustos Physical Therapist               Mobility     Row Name 09/19/22 1525 09/19/22 1445       Bed Mobility    Bed Mobility supine-sit;sit-supine  -LR supine-sit;sit-supine  -LR    Supine-Sit [place] (Bed Mobility) modified independence   -LR modified independence  -LR    Sit-Supine [place] (Bed Mobility) modified independence  -LR modified independence  -LR    Row Name 09/19/22 1525 09/19/22 1445       Sit-Stand Transfer    Sit-Stand [place] (Transfers) contact guard  -LR contact guard  -LR    Row Name 09/19/22 1525 09/19/22 1445       Gait/Stairs (Locomotion)    [place] Level (Gait) contact guard  -LR contact guard  -LR    Assistive Device (Gait) walker, front-wheeled  -LR walker, front-wheeled  -LR    Ambulated day of surgery or within 4 hours of PACU discharge -- yes  -LR    Distance in Feet (Gait) 90'x1, 10'x1  -LR 2'x1  -LR    Deviations/Abnormal Patterns (Gait) gait speed decreased;nick decreased  -LR gait speed decreased;nick decreased;festinating/shuffling  -LR    [place] Level (Stairs) contact guard  -LR --    Assistive Device (Stairs) walker, front-wheeled  -LR --    Number of Steps (Stairs) 1 curb step x2  -LR --    Ascending Technique (Stairs) step-to-step  -LR --    Descending Technique (Stairs) step-to-step  -LR --    Row Name 09/19/22 1525 09/19/22 1445       Mobility    Extremity Weight-bearing Status right lower extremity  -LR right lower extremity  -LR    Right Lower Extremity (Weight-bearing Status) weight-bearing as tolerated (WBAT)  -LR weight-bearing as tolerated (WBAT)  -LR          User Key  (r) = Recorded By, (t) = Taken By, (c) = Cosigned By    Initials Name Provider Type    LR Chris Bustos Physical Therapist               Obj/Interventions     Row Name 09/19/22 1445          Range of Motion Comprehensive    General Range of Motion no range of motion deficits identified  -LR     Comment, General Range of Motion R knee 10-95 degrees, BLE grossly WFL  -LR     Row Name 09/19/22 1445          Strength Comprehensive (MMT)    Comment, General Manual Muscle Testing (MMT) Assessment LLE grossly 4/5, RLE at least 3/5  -LR     Row Name 09/19/22 1445          Sensory Assessment (Somatosensory)    Sensory  Assessment (Somatosensory) sensation intact;LE sensation intact;other (see comments)  slight decrease in sensation in RLE  -LR           User Key  (r) = Recorded By, (t) = Taken By, (c) = Cosigned By    Initials Name Provider Type    Chris Cordero Physical Therapist               Goals/Plan     Row Name 09/19/22 1525 09/19/22 1445       Bed Mobility Goal 1 (PT)    Activity/Assistive Device (Bed Mobility Goal 1, PT) sit to supine;supine to sit  -LR sit to supine;supine to sit  -LR    [place] Level/Cues Needed (Bed Mobility Goal 1, PT) independent  -LR independent  -LR    Time Frame (Bed Mobility Goal 1, PT) by discharge  -LR by discharge  -LR    Progress/Outcomes (Bed Mobility Goal 1, PT) goal not met  -LR goal not met  -LR    Row Name 09/19/22 1525 09/19/22 1445       Transfer Goal 1 (PT)    Activity/Assistive Device (Transfer Goal 1, PT) sit-to-stand/stand-to-sit;bed-to-chair/chair-to-bed  -LR sit-to-stand/stand-to-sit;bed-to-chair/chair-to-bed  -LR    [place] Level/Cues Needed (Transfer Goal 1, PT) modified independence  -LR modified independence  -LR    Time Frame (Transfer Goal 1, PT) by discharge  -LR by discharge  -LR    Progress/Outcome (Transfer Goal 1, PT) goal not met  -LR goal not met  -LR    Row Name 09/19/22 1525 09/19/22 1445       Gait Training Goal 1 (PT)    Activity/Assistive Device (Gait Training Goal 1, PT) gait (walking locomotion);assistive device use  -LR gait (walking locomotion);assistive device use  -LR    [place] Level (Gait Training Goal 1, PT) modified independence  -LR modified independence  -LR    Distance (Gait Training Goal 1, PT) 150'x1  -'x1  -LR    Time Frame (Gait Training Goal 1, PT) by discharge  -LR by discharge  -LR    Progress/Outcome (Gait Training Goal 1, PT) goal not met  -LR goal not met  -LR    Row Name 09/19/22 1525 09/19/22 1445       Stairs Goal 1 (PT)    Activity/Assistive Device (Stairs Goal 1, PT) stairs, all skills;ascending  stairs;descending stairs  -LR stairs, all skills;ascending stairs;descending stairs  -LR    Number of Stairs (Stairs Goal 1, PT) 1  -LR 1  -LR    Time Frame (Stairs Goal 1, PT) by discharge  -LR by discharge  -LR    Progress/Outcome (Stairs Goal 1, PT) goal not met  -LR goal not met  -LR    Row Name 09/19/22 1445          Therapy Assessment/Plan (PT)    Planned Therapy Interventions (PT) balance training;bed mobility training;home exercise program;manual therapy techniques;joint mobilization;gait training;neuromuscular re-education;patient/family education;ROM (range of motion);stair training;strengthening;stretching;transfer training  -LR           User Key  (r) = Recorded By, (t) = Taken By, (c) = Cosigned By    Initials Name Provider Type    LR Chris Bustos Physical Therapist               Clinical Impression     Row Name 09/19/22 1525 09/19/22 1445       Pain    Pretreatment Pain Rating 0/10 - no pain  -LR 1/10  -LR    Posttreatment Pain Rating 0/10 - no pain  -LR 1/10  -LR    Pain Location - -- head  -LR    Pain Intervention(s) -- Repositioned  -LR    Row Name 09/19/22 1525 09/19/22 1445       Plan of Care Review    Plan of Care Reviewed With patient  -LR patient;spouse  -LR    Outcome Evaluation PT tx completed. Dressing change completed and MD okay with ambulation and steps. [place] for supine<>sit transfer with HOB up and bed rails. CGA for sit<>stand x4 with RW. CGA for ambulation 90'x1 and 15'x1 with RW. CGA for up/down 1 curb step x2. Once with PT and once with wife. Educated patient how to go up/down stairs and use LLE to assist RLE into bed. Anticipate home with assist and OPPT.  -LR PT eval completed. Patient pleasant and motivated to return home. [place] for supine<>sit transfer with HOB up and bed rails. CGA for sit<>stand and ambulation 2'x1. Patient did not note any R knee buckling. Upon standing patient knee started to leak blood, nursing notified immediately and she added gauze and compression.  Unable to attempt further walking or stair training.  -LR    Row Name 09/19/22 1445          Therapy Assessment/Plan (PT)    Patient/Family Therapy Goals Statement (PT) Patient wants to return home.  -LR     Rehab Potential (PT) good, to achieve stated therapy goals  -LR     Criteria for Skilled Interventions Met (PT) yes;meets criteria;skilled treatment is necessary  -LR     Therapy Frequency (PT) daily  -LR     Predicted Duration of Therapy Intervention (PT) until d/c or until all goals met  -LR     Row Name 09/19/22 1445          Vital Signs    Pre Systolic BP Rehab 188  -LR     Pre Treatment Diastolic BP 88  -LR     Pretreatment Heart Rate (beats/min) 52  -LR     Pre SpO2 (%) 100  -LR     O2 Delivery Pre Treatment room air  -LR     Pre Patient Position Supine  -LR     Row Name 09/19/22 1445          Positioning and Restraints    Pre-Treatment Position in bed  -LR     Post Treatment Position bed  -LR     In Bed notified nsg;call light within reach;encouraged to call for assist;exit alarm on  -LR           User Key  (r) = Recorded By, (t) = Taken By, (c) = Cosigned By    Initials Name Provider Type    LR Chris Bustos Physical Therapist               Outcome Measures     Row Name 09/19/22 1441          How much help from another person do you currently need...    Turning from your back to your side while in flat bed without using bedrails? 4  -LR     Moving from lying on back to sitting on the side of a flat bed without bedrails? 3  -LR     Moving to and from a bed to a chair (including a wheelchair)? 3  -LR     Standing up from a chair using your arms (e.g., wheelchair, bedside chair)? 3  -LR     Climbing 3-5 steps with a railing? 3  -LR     To walk in hospital room? 3  -LR     AM-PAC 6 Clicks Score (PT) 19  -LR     Highest level of mobility 6 --> Walked 10 steps or more  -LR     Row Name 09/19/22 1441          Functional Assessment    Outcome Measure Options AM-PAC 6 Clicks Basic Mobility (PT)  -LR            User Key  (r) = Recorded By, (t) = Taken By, (c) = Cosigned By    Initials Name Provider Type    LR Chris Bustos Physical Therapist                             Physical Therapy Education                 Title: PT OT SLP Therapies (In Progress)     Topic: Physical Therapy (Done)     Point: Mobility training (Done)     Learning Progress Summary           Patient Acceptance, E,TB, VU by LR at 9/19/2022 1458    Comment: Educated on PT POC, WB status, and goals.                   Point: Home exercise program (Done)     Learning Progress Summary           Patient Acceptance, E,TB, VU by LR at 9/19/2022 1458    Comment: Educated on PT POC, WB status, and goals.                   Point: Precautions (Done)     Learning Progress Summary           Patient Acceptance, E,TB, VU by LR at 9/19/2022 1458    Comment: Educated on PT POC, WB status, and goals.                               User Key     Initials Effective Dates Name Provider Type Discipline    LR 06/16/21 -  Chris Bustos Physical Therapist PT              PT Recommendation and Plan  Planned Therapy Interventions (PT): balance training, bed mobility training, home exercise program, manual therapy techniques, joint mobilization, gait training, neuromuscular re-education, patient/family education, ROM (range of motion), stair training, strengthening, stretching, transfer training  Plan of Care Reviewed With: patient  Outcome Evaluation: PT tx completed. Dressing change completed and MD okay with ambulation and steps. [place] for supine<>sit transfer with HOB up and bed rails. CGA for sit<>stand x4 with RW. CGA for ambulation 90'x1 and 15'x1 with RW. CGA for up/down 1 curb step x2. Once with PT and once with wife. Educated patient how to go up/down stairs and use LLE to assist RLE into bed. Anticipate home with assist and OPPT.     Time Calculation:    PT Charges     Row Name 09/19/22 7338 09/19/22 3254          Time Calculation    Start Time 1525  -LR 1445  -LR      Stop Time 1540  -LR 1514  -LR     Time Calculation (min) 15 min  -LR 29 min  -LR     PT Received On 09/19/22  -LR 09/19/22  -LR     PT Goal Re-Cert Due Date -- 10/02/22  -LR            Time Calculation- PT    Total Timed Code Minutes- PT 15 minute(s)  -LR --            Timed Charges    75478 - Gait Training Minutes  15  -LR --            Untimed Charges    PT Eval/Re-eval Minutes -- 29  -LR            Total Minutes    Timed Charges Total Minutes 15  -LR --     Untimed Charges Total Minutes -- 29  -LR      Total Minutes 15  -LR 29  -LR           User Key  (r) = Recorded By, (t) = Taken By, (c) = Cosigned By    Initials Name Provider Type    LR Chris Bustos Physical Therapist              Therapy Charges for Today     Code Description Service Date Service Provider Modifiers Qty    97247955939 HC PT EVAL MOD COMPLEXITY 2 9/19/2022 Chris Bustos GP 1    16139416396 HC GAIT TRAINING EA 15 MIN 9/19/2022 Chris Bustos GP 1          PT G-Codes  Outcome Measure Options: AM-PAC 6 Clicks Basic Mobility (PT)  AM-PAC 6 Clicks Score (PT): 19    Chris Bustos  9/19/2022

## 2022-09-19 NOTE — INTERVAL H&P NOTE
H&P reviewed. The patient was examined and there are no changes to the H&P.      Vitals:    22 0809   BP: (!) 192/93   Pulse: 66   Resp: 18   Temp: 97.9 °F (36.6 °C)   SpO2: 95%       Allergies   Allergen Reactions    Statins Other (See Comments)     Muscle pain       Prior to Admission medications    Medication Sig Start Date End Date Taking? Authorizing Provider   latanoprost (XALATAN) 0.005 % ophthalmic solution Administer 1 drop to both eyes Every Night. 16  Yes Daren Robledo MD   meloxicam (MOBIC) 15 MG tablet Take 15 mg by mouth Daily.   Yes Suhail Vazquez MD   metoprolol tartrate (LOPRESSOR) 50 MG tablet Take 50 mg by mouth 2 (Two) Times a Day.   Yes Suhail Vazquez MD   Multiple Vitamins-Minerals (PRESERVISION AREDS PO) Take 1 tablet by mouth 2 (Two) Times a Day.   Yes Suhail Vazquez MD   verapamil PM (VERELAN PM) 360 MG 24 hr capsule Take 360 mg by mouth daily. 5/3/14  Yes Suhail Vazquez MD       Past Medical History:   Diagnosis Date    Arthritis     Elevated cholesterol     GERD (gastroesophageal reflux disease)     OCCASIONAL    High blood pressure     Kidney stone     Sleep apnea     using c-pap    Vitreous floater 2013       Past Surgical History:   Procedure Laterality Date    COLONOSCOPY      SEPTOPLASTY  2013    Nasal surgery procedure (Nasal septoplasty.)    TOTAL HIP ARTHROPLASTY Right 10/14/2019    Procedure: TOTAL HIP ARTHROPLASTY ANTERIOR;  Surgeon: Nathaniel Kate MD;  Location[facility];  Service: Orthopedics       Social History     Socioeconomic History    Marital status:    Tobacco Use    Smoking status: Former Smoker     Types: Cigarettes     Quit date:      Years since quittin.7    Smokeless tobacco: Never Used   Vaping Use    Vaping Use: Never used   Substance and Sexual Activity    Alcohol use: Yes     Comment: glass of wine nightly    Drug use: No    Sexual activity: Defer       Family History   Problem  Relation Age of Onset    Macular degeneration Mother     Glaucoma Mother     Heart disease Other     Hypertension Other              This document has been electronically signed by Nathaniel Kate MD on September 19, 2022 09:18 CDT

## 2022-09-19 NOTE — PLAN OF CARE
Goal Outcome Evaluation:  Plan of Care Reviewed With: patient, spouse           Outcome Evaluation: PT eval completed. Patient pleasant and motivated to return home. [place] for supine<>sit transfer with HOB up and bed rails. CGA for sit<>stand and ambulation 2'x1. Patient did not note any R knee buckling. Upon standing patient knee started to leak blood, nursing notified immediately and she added gauze and compression. Unable to attempt further walking or stair training.

## 2022-09-19 NOTE — ANESTHESIA POSTPROCEDURE EVALUATION
Patient: Sam Infante    Procedure Summary     Date: 09/19/22 Room / Location: [facility] OR  / [facility] OR    Anesthesia Start: 1055 Anesthesia Stop: 1326    Procedure: RIGHT TOTAL KNEE ARTHROPLASTY WITH ADDUCTOR CANAL BLOCK (Right ) Diagnosis:       Chronic pain of right knee      Primary osteoarthritis of right knee      Essential hypertension      Gastroesophageal reflux disease without esophagitis      BERNADETTE (obstructive sleep apnea)      (Chronic pain of right knee [M25.561, G89.29])      (Primary osteoarthritis of right knee [M17.11])      (Essential hypertension [I10])      (Gastroesophageal reflux disease without esophagitis [K21.9])      (BERNADETTE (obstructive sleep apnea) [G47.33])    Surgeons: Nathaniel Kate MD Provider: Stef Wynne MD    Anesthesia Type: spinal, regional ASA Status: 3          Anesthesia Type: spinal, regional    Vitals  No vitals data found for the desired time range.          Post Anesthesia Care and Evaluation    Patient location during evaluation: PACU  Patient participation: complete - patient participated  Level of consciousness: awake and alert  Pain score: 0    Airway patency: patent  Anesthetic complications: No anesthetic complications  PONV Status: controlled  Cardiovascular status: acceptable  Respiratory status: acceptable  Hydration status: acceptable    Comments: 97% 96.9 56 182/84

## 2022-09-19 NOTE — THERAPY EVALUATION
Patient Name: Sam Infante  : 1955    MRN: [id number]                              Today's Date: 2022       Admit Date: 2022    Visit Dx:     ICD-10-CM ICD-9-CM   1. Essential hypertension  I10 401.9   2. BERNADETTE (obstructive sleep apnea)  G47.33 327.23   3. Gastroesophageal reflux disease without esophagitis  K21.9 530.81   4. Primary osteoarthritis of right knee  M17.11 715.16   5. Chronic pain of right knee  M25.561 719.46    G89.29 338.29   6. Impaired functional mobility, balance, gait, and endurance  Z74.09 V49.89     Patient Active Problem List   Diagnosis   • Borderline glaucoma, open angle with borderline findings   • Borderline glaucoma   • Left hand pain   • Bilateral carpal tunnel syndrome   • Right hip pain   • Primary osteoarthritis of right hip   • Essential hypertension   • Attention deficit hyperactivity disorder   • Benign prostatic hyperplasia   • Fatigue   • Gastroesophageal reflux disease without esophagitis   • Hyperlipidemia   • Mixed anxiety and depressive disorder   • BERNADETTE (obstructive sleep apnea)   • Routine physical examination   • Hx of total hip arthroplasty, right   • Right shoulder pain   • Rotator cuff impingement syndrome of right shoulder   • Impingement syndrome of right shoulder   • Presence of right hip implant   • Primary osteoarthritis of both knees   • Primary osteoarthritis of right knee   • Chronic pain of right knee     Past Medical History:   Diagnosis Date   • Arthritis    • Elevated cholesterol    • GERD (gastroesophageal reflux disease)     OCCASIONAL   • High blood pressure    • Kidney stone    • Sleep apnea     using c-pap   • Vitreous floater 2013     Past Surgical History:   Procedure Laterality Date   • COLONOSCOPY     • SEPTOPLASTY  2013    Nasal surgery procedure (Nasal septoplasty.)   • TOTAL HIP ARTHROPLASTY Right 10/14/2019    Procedure: TOTAL HIP ARTHROPLASTY ANTERIOR;  Surgeon: Nathaniel Kate MD;  Location: [facility]  OR;  Service: Orthopedics      General Information     Row Name 09/19/22 1445          Physical Therapy Time and Intention    Document Type evaluation  -LR     Mode of Treatment individual therapy;physical therapy  -LR     Row Name 09/19/22 1445          General Information    Patient Profile Reviewed yes  -LR     Prior Level of Function independent:;all household mobility;community mobility;gait;transfer;bed mobility;ADL's;driving  -LR     Row Name 09/19/22 1445          Living Environment    People in Home spouse  -LR     Row Name 09/19/22 1445          Home Main Entrance    Number of Stairs, Main Entrance one  -LR     Stair Railings, Main Entrance none  -LR     Row Name 09/19/22 1445          Stairs Within Home, Primary    Stairs, Within Home, Primary Walkin shower, shower chair, RW  -LR     Number of Stairs, Within Home, Primary other (see comments)  -LR     Stairs Comment, Within Home, Primary 3 story house but can live on second floor which is ground level  -LR     Row Name 09/19/22 1445          Cognition    Orientation Status (Cognition) oriented x 4  -LR     Row Name 09/19/22 1445          Safety Issues, Functional Mobility    Safety Issues Affecting Function (Mobility) insight into deficits/self-awareness;safety precautions follow-through/compliance;safety precaution awareness  -LR     Impairments Affecting Function (Mobility) balance;endurance/activity tolerance;pain  -LR           User Key  (r) = Recorded By, (t) = Taken By, (c) = Cosigned By    Initials Name Provider Type    LR Chris Bustos Physical Therapist               Mobility     Row Name 09/19/22 1445          Bed Mobility    Bed Mobility supine-sit;sit-supine  -LR     Supine-Sit [place] (Bed Mobility) modified independence  -LR     Sit-Supine [place] (Bed Mobility) modified independence  -LR     Row Name 09/19/22 1445          Sit-Stand Transfer    Sit-Stand [place] (Transfers) contact guard  -     Row Name 09/19/22 1441           Gait/Stairs (Locomotion)    [place] (Gait) contact guard  -LR     Assistive Device (Gait) walker, front-wheeled  -LR     Ambulated day of surgery or within 4 hours of PACU discharge yes  -LR     Distance in Feet (Gait) 2'x1  -LR     Deviations/Abnormal Patterns (Gait) gait speed decreased;nick decreased;festinating/shuffling  -LR     Row Name 09/19/22 1445          Mobility    Extremity Weight-bearing Status right lower extremity  -LR     Right Lower Extremity (Weight-bearing Status) weight-bearing as tolerated (WBAT)  -LR           User Key  (r) = Recorded By, (t) = Taken By, (c) = Cosigned By    Initials Name Provider Type    LR Chris Bustos Physical Therapist               Obj/Interventions     Row Name 09/19/22 1445          Range of Motion Comprehensive    General Range of Motion no range of motion deficits identified  -LR     Comment, General Range of Motion R knee 10-95 degrees, BLE grossly WFL  -LR     Row Name 09/19/22 1445          Strength Comprehensive (MMT)    Comment, General Manual Muscle Testing (MMT) Assessment LLE grossly 4/5, RLE at least 3/5  -LR     Row Name 09/19/22 1445          Sensory Assessment (Somatosensory)    Sensory Assessment (Somatosensory) sensation intact;LE sensation intact;other (see comments)  slight decrease in sensation in RLE  -LR           User Key  (r) = Recorded By, (t) = Taken By, (c) = Cosigned By    Initials Name Provider Type    LR Chris Bustos Physical Therapist               Goals/Plan     Row Name 09/19/22 1445          Bed Mobility Goal 1 (PT)    Activity/Assistive Device (Bed Mobility Goal 1, PT) sit to supine;supine to sit  -LR     [place]/Cues Needed (Bed Mobility Goal 1, PT) independent  -LR     Time Frame (Bed Mobility Goal 1, PT) by discharge  -LR     Progress/Outcomes (Bed Mobility Goal 1, PT) goal not met  -LR     Row Name 09/19/22 1445          Transfer Goal 1 (PT)    Activity/Assistive Device (Transfer Goal 1, PT)  sit-to-stand/stand-to-sit;bed-to-chair/chair-to-bed  -LR     [place] Level/Cues Needed (Transfer Goal 1, PT) modified independence  -LR     Time Frame (Transfer Goal 1, PT) by discharge  -LR     Progress/Outcome (Transfer Goal 1, PT) goal not met  -LR     Row Name 09/19/22 1445          Gait Training Goal 1 (PT)    Activity/Assistive Device (Gait Training Goal 1, PT) gait (walking locomotion);assistive device use  -LR     [place] Level (Gait Training Goal 1, PT) modified independence  -LR     Distance (Gait Training Goal 1, PT) 150'x1  -LR     Time Frame (Gait Training Goal 1, PT) by discharge  -LR     Progress/Outcome (Gait Training Goal 1, PT) goal not met  -LR     Row Name 09/19/22 1445          Stairs Goal 1 (PT)    Activity/Assistive Device (Stairs Goal 1, PT) stairs, all skills;ascending stairs;descending stairs  -LR     Number of Stairs (Stairs Goal 1, PT) 1  -LR     Time Frame (Stairs Goal 1, PT) by discharge  -LR     Progress/Outcome (Stairs Goal 1, PT) goal not met  -LR     Row Name 09/19/22 1445          Therapy Assessment/Plan (PT)    Planned Therapy Interventions (PT) balance training;bed mobility training;home exercise program;manual therapy techniques;joint mobilization;gait training;neuromuscular re-education;patient/family education;ROM (range of motion);stair training;strengthening;stretching;transfer training  -LR           User Key  (r) = Recorded By, (t) = Taken By, (c) = Cosigned By    Initials Name Provider Type    LR Chris Bustos Physical Therapist               Clinical Impression     Row Name 09/19/22 1445          Pain    Pretreatment Pain Rating 1/10  -LR     Posttreatment Pain Rating 1/10  -LR     Pain Location - head  -LR     Pain Intervention(s) Repositioned  -LR     Row Name 09/19/22 1445          Plan of Care Review    Plan of Care Reviewed With patient;spouse  -LR     Outcome Evaluation PT eval completed. Patient pleasant and motivated to return home. [place] for  supine<>sit transfer with HOB up and bed rails. CGA for sit<>stand and ambulation 2'x1. Patient did not note any R knee buckling. Upon standing patient knee started to leak blood, nursing notified immediately and she added gauze and compression. Unable to attempt further walking or stair training.  -LR     Row Name 09/19/22 1445          Therapy Assessment/Plan (PT)    Patient/Family Therapy Goals Statement (PT) Patient wants to return home.  -LR     Rehab Potential (PT) good, to achieve stated therapy goals  -LR     Criteria for Skilled Interventions Met (PT) yes;meets criteria;skilled treatment is necessary  -LR     Therapy Frequency (PT) daily  -LR     Predicted Duration of Therapy Intervention (PT) until d/c or until all goals met  -LR     Row Name 09/19/22 1443          Vital Signs    Pre Systolic BP Rehab 188  -LR     Pre Treatment Diastolic BP 88  -LR     Pretreatment Heart Rate (beats/min) 52  -LR     Pre SpO2 (%) 100  -LR     O2 Delivery Pre Treatment room air  -LR     Pre Patient Position Supine  -LR     Row Name 09/19/22 144          Positioning and Restraints    Pre-Treatment Position in bed  -LR     Post Treatment Position bed  -LR     In Bed notified nsg;call light within reach;encouraged to call for assist;exit alarm on  -LR           User Key  (r) = Recorded By, (t) = Taken By, (c) = Cosigned By    Initials Name Provider Type    LR Chris Bustos Physical Therapist               Outcome Measures     Row Name 09/19/22 1447          How much help from another person do you currently need...    Turning from your back to your side while in flat bed without using bedrails? 4  -LR     Moving from lying on back to sitting on the side of a flat bed without bedrails? 3  -LR     Moving to and from a bed to a chair (including a wheelchair)? 3  -LR     Standing up from a chair using your arms (e.g., wheelchair, bedside chair)? 3  -LR     Climbing 3-5 steps with a railing? 3  -LR     To walk in hospital room? 3   -LR     AM-PAC 6 Clicks Score (PT) 19  -LR     Highest level of mobility 6 --> Walked 10 steps or more  -LR     Row Name 09/19/22 1443          Functional Assessment    Outcome Measure Options AM-PAC 6 Clicks Basic Mobility (PT)  -LR           User Key  (r) = Recorded By, (t) = Taken By, (c) = Cosigned By    Initials Name Provider Type    LR Chris Bustos Physical Therapist                             Physical Therapy Education                 Title: PT OT SLP Therapies (In Progress)     Topic: Physical Therapy (Done)     Point: Mobility training (Done)     Learning Progress Summary           Patient Acceptance, E,TB, VU by LR at 9/19/2022 1458    Comment: Educated on PT POC, WB status, and goals.                   Point: Home exercise program (Done)     Learning Progress Summary           Patient Acceptance, E,TB, VU by LR at 9/19/2022 1458    Comment: Educated on PT POC, WB status, and goals.                   Point: Precautions (Done)     Learning Progress Summary           Patient Acceptance, E,TB, VU by LR at 9/19/2022 1458    Comment: Educated on PT POC, WB status, and goals.                               User Key     Initials Effective Dates Name Provider Type Discipline    LR 06/16/21 -  Chris Bustos Physical Therapist PT              PT Recommendation and Plan  Planned Therapy Interventions (PT): balance training, bed mobility training, home exercise program, manual therapy techniques, joint mobilization, gait training, neuromuscular re-education, patient/family education, ROM (range of motion), stair training, strengthening, stretching, transfer training  Plan of Care Reviewed With: patient, spouse  Outcome Evaluation: PT eval completed. Patient pleasant and motivated to return home. [place] for supine<>sit transfer with HOB up and bed rails. CGA for sit<>stand and ambulation 2'x1. Patient did not note any R knee buckling. Upon standing patient knee started to leak blood, nursing notified immediately  and she added gauze and compression. Unable to attempt further walking or stair training.     Time Calculation:    PT Charges     Row Name 09/19/22 1514             Time Calculation    Start Time 1445  -LR      Stop Time 1514  -LR      Time Calculation (min) 29 min  -LR      PT Received On 09/19/22  -LR      PT Goal Re-Cert Due Date 10/02/22  -LR              Untimed Charges    PT Eval/Re-eval Minutes 29  -LR              Total Minutes    Untimed Charges Total Minutes 29  -LR       Total Minutes 29  -LR            User Key  (r) = Recorded By, (t) = Taken By, (c) = Cosigned By    Initials Name Provider Type    LR Chris Bustos Physical Therapist              Therapy Charges for Today     Code Description Service Date Service Provider Modifiers Qty    84621329955 HC PT EVAL MOD COMPLEXITY 2 9/19/2022 Chris Bustos GP 1          PT G-Codes  Outcome Measure Options: AM-PAC 6 Clicks Basic Mobility (PT)  AM-PAC 6 Clicks Score (PT): 19    Chris Bustos  9/19/2022

## 2022-09-19 NOTE — ANESTHESIA PROCEDURE NOTES
Peripheral Block      Patient reassessed immediately prior to procedure    Patient location during procedure: pre-op  Stop time: 9/19/2022 8:55 AM  Reason for block: at surgeon's request, post-op pain management and secondary anesthetic  Performed by  Anesthesiologist: Lalita Ramos DO  Preanesthetic Checklist  Completed: patient identified, IV checked, site marked, risks and benefits discussed, surgical consent, monitors and equipment checked, pre-op evaluation and timeout performed  Prep:  Pt Position: supine  Sterile barriers:cap, gloves and sterile barriers  Prep: ChloraPrep  Patient monitoring: blood pressure monitoring, continuous pulse oximetry and EKG  Procedure    Sedation: no  Performed under: PNB  Guidance:ultrasound guided    ULTRASOUND INTERPRETATION.  Using ultrasound guidance a 21 G gauge needle was placed in close proximity to the femoral nerve, at which point, under ultrasound guidance anesthetic was injected in the area of the nerve and spread of the anesthesia was seen on ultrasound in close proximity thereto.  There were no abnormalities seen on ultrasound; a digital image was taken; and the patient tolerated the procedure with no complications. Images:still images obtained, printed/placed on chart    Laterality:right  Block Type:adductor canal block  Injection Technique:single-shot  Needle Type:echogenic  Needle Gauge:20 G  Resistance on Injection: none  Catheter Size:20 G  Cath Depth at skin: 5 cm    Medications Used: lidocaine PF 1% (XYLOCAINE) injection, 30 mg  ropivacaine (NAROPIN) 0.5 % injection, 30 mL      Post Assessment  Injection Assessment: negative aspiration for heme, no paresthesia on injection and incremental injection  Patient Tolerance:comfortable throughout block  Complications:no  Additional Notes  Pt & side identified  U/S used throughout and needle seen throughout  No complications  Pt tolerated procedure well

## 2022-09-19 NOTE — PLAN OF CARE
Goal Outcome Evaluation:  Plan of Care Reviewed With: patient      OT eval on this date.  Pt was modified independent with bed mobility.  He needed CGA for sit to stand and toilet transfer.  LE dressing required CGA/min A for don/doffing socks.  Pt could benefit from OT serviced to increase independence with ADLs and functional mobility.  Rec OP PT when D/C from hospital.

## 2022-09-19 NOTE — ANESTHESIA PREPROCEDURE EVALUATION
Anesthesia Evaluation     Patient summary reviewed   no history of anesthetic complications:  NPO Solid Status: > 8 hours  NPO Liquid Status: > 8 hours           Airway   Mallampati: III  TM distance: >3 FB  Neck ROM: full  Possible difficult intubation  Dental    (+) poor dentition    Pulmonary     breath sounds clear to auscultation  (+) a smoker Former, sleep apnea,   (-) asthma, rhonchi, decreased breath sounds, wheezes, pulmonary embolism  Cardiovascular - normal exam  Exercise tolerance: good (4-7 METS)    NYHA Classification: II  ECG reviewed  Patient on routine beta blocker and Beta blocker given within 24 hours of surgery  Rhythm: regular  Rate: normal    (+) hypertension 2 medications or greater, hyperlipidemia,   (-) pacemaker, past MI, dysrhythmias, angina, murmur, cardiac stents, CABG, DVT    ROS comment: EKG 9/13/2022:  Normal sinus rhythm  Possible Left atrial enlargement  T wave abnormality, consider lateral ischemia  Prolonged QT  Abnormal ECG  When compared with ECG of 09-OCT-2019 13:31    Neuro/Psych  (+) weakness, numbness, psychiatric history Anxiety, Depression and ADHD,    (-) seizures, TIA, CVA  GI/Hepatic/Renal/Endo    (+) obesity,  GERD,  renal disease stones,   (-) morbid obesity, diabetes    Musculoskeletal     (+) chronic pain, gait problem,   Abdominal   (+) obese,    Substance History      OB/GYN negative ob/gyn ROS         Other   arthritis,      ROS/Med Hx Other: Hgb=15, T&S    Per pt takes verapamil for HTN only    Hx of BERNADETTE- noncompliant with CPAP    Hx of ADHD: per pt hasn't been prescribed adderall in years                    Anesthesia Plan    ASA 3     spinal and regional     (Discussed spinal & peripheral nerve block (adductor canal) for post op pain relief and patient understands possible complications, risks, & agrees.)  intravenous induction     Anesthetic plan, risks, benefits, and alternatives have been provided, discussed and informed consent has been obtained with:  patient and spouse/significant other.  Pre-procedure education provided  Use of blood products discussed with patient  Consented to blood products.   Plan discussed with CRNA.

## 2022-09-19 NOTE — ANESTHESIA PROCEDURE NOTES
Spinal Block      Patient location during procedure: OR  Indication:at surgeon's request  Performed By  CRNA/ROBBY: Francisco Shetty CRNA  Preanesthetic Checklist  Completed: patient identified, IV checked, site marked, risks and benefits discussed, surgical consent, monitors and equipment checked, pre-op evaluation and timeout performed  Spinal Block Prep:  Patient Position:sitting  Sterile Tech:cap, gloves, gown, mask and sterile barriers  Prep:DuraPrep  Patient Monitoring:blood pressure monitoring, continuous pulse oximetry and EKG  Spinal Block Procedure  Approach:midline  Guidance:landmark technique and palpation technique  Location:L3-L4  Needle Type:Pencan  Needle Gauge:24 G  Paresthesia: no  Fluid Appearance:clear  Medications: bupivacaine PF (MARCAINE) 0.75 % injection, 2 mL  Med Administered at 9/19/2022 11:01 AM   Post Assessment  Patient Tolerance:patient tolerated the procedure well with no apparent complications  Complications no

## 2022-09-19 NOTE — THERAPY EVALUATION
Acute Care - Occupational Therapy Initial Evaluation  [facility]     Patient Name: Sam Infante  : 1955  MRN: [id number]  Today's Date: 2022     Date of Referral to OT: 22       Admit Date: 2022       ICD-10-CM ICD-9-CM   1. Primary osteoarthritis of both knees  M17.0 715.16   2. Essential hypertension  I10 401.9   3. BERNADETTE (obstructive sleep apnea)  G47.33 327.23   4. Gastroesophageal reflux disease without esophagitis  K21.9 530.81   5. Primary osteoarthritis of right knee  M17.11 715.16   6. Chronic pain of right knee  M25.561 719.46    G89.29 338.29   7. Impaired functional mobility, balance, gait, and endurance  Z74.09 V49.89   8. Presence of right hip implant  Z96.641 V43.64   9. Impaired mobility and activities of daily living  Z74.09 V49.89    Z78.9      Patient Active Problem List   Diagnosis   • Borderline glaucoma, open angle with borderline findings   • Borderline glaucoma   • Left hand pain   • Bilateral carpal tunnel syndrome   • Right hip pain   • Primary osteoarthritis of right hip   • Essential hypertension   • Attention deficit hyperactivity disorder   • Benign prostatic hyperplasia   • Fatigue   • Gastroesophageal reflux disease without esophagitis   • Hyperlipidemia   • Mixed anxiety and depressive disorder   • BERNADETTE (obstructive sleep apnea)   • Routine physical examination   • Hx of total hip arthroplasty, right   • Right shoulder pain   • Rotator cuff impingement syndrome of right shoulder   • Impingement syndrome of right shoulder   • Presence of right hip implant   • Primary osteoarthritis of both knees   • Primary osteoarthritis of right knee   • Chronic pain of right knee     Past Medical History:   Diagnosis Date   • Arthritis    • Elevated cholesterol    • GERD (gastroesophageal reflux disease)     OCCASIONAL   • High blood pressure    • Kidney stone    • Sleep apnea     using c-pap   • Vitreous floater 2013     Past Surgical History:   Procedure  Laterality Date   • COLONOSCOPY     • SEPTOPLASTY  12/18/2013    Nasal surgery procedure (Nasal septoplasty.)   • TOTAL HIP ARTHROPLASTY Right 10/14/2019    Procedure: TOTAL HIP ARTHROPLASTY ANTERIOR;  Surgeon: Nathaniel Kate MD;  Location: [facility];  Service: Orthopedics         OT ASSESSMENT FLOWSHEET (last 12 hours)     OT Evaluation and Treatment     Row Name 09/19/22 1544                   OT Time and Intention    Subjective Information no complaints  -RB        Document Type evaluation  -RB        Mode of Treatment individual therapy;occupational therapy  -RB        Patient Effort good  -RB                  General Information    Patient Profile Reviewed yes  -RB        Prior Level of Function independent:;all household mobility;gait;transfer;ADL's;driving  -RB        Equipment Currently Used at Home walker, rolling  -RB        Existing Precautions/Restrictions fall  -RB                  Living Environment    Current Living Arrangements home  -RB        Home Accessibility stairs to enter home  -RB        People in Home spouse  -RB                  Home Main Entrance    Number of Stairs, Main Entrance one  -RB        Stair Railings, Main Entrance none  -RB                  Stairs Within Home, Primary    Stairs Comment, Within Home, Primary 3 story house but lives on 2nd floor.  -RB                  Home Use of Assistive/Adaptive Equipment    Equipment Currently Used at Home cpap  -RB                  Pain Assessment    Pretreatment Pain Rating 0/10 - no pain  -RB        Posttreatment Pain Rating 0/10 - no pain  -RB                  Cognition    Orientation Status (Cognition) oriented x 4  -RB                  Range of Motion Comprehensive    General Range of Motion no range of motion deficits identified  -RB                  Strength Comprehensive (MMT)    General Manual Muscle Testing (MMT) Assessment no strength deficits identified  -RB        Comment, General Manual Muscle Testing (MMT) Assessment  B UE strength was 4+ to 5/5.  -RB                  Sensory Assessment (Somatosensory)    Sensory Assessment (Somatosensory) bilateral UE;sensation intact  -RB                  Activities of Daily Living    BADL Assessment/Intervention lower body dressing;toileting  -RB                  Lower Body Dressing Assessment/Training    [place] Level (Lower Body Dressing) lower body dressing skills;doff;don;contact guard assist;minimum assist (75% patient effort)  -RB                  Toileting Assessment/Training    [place] Level (Toileting) toileting skills;contact guard assist  -RB                  Mobility    Extremity Weight-bearing Status right lower extremity  -RB        Right Lower Extremity (Weight-bearing Status) weight-bearing as tolerated (WBAT)  -RB                  Bed Mobility    Bed Mobility supine-sit;sit-supine  -RB        Supine-Sit [place] (Bed Mobility) modified independence  -RB        Sit-Supine [place] (Bed Mobility) modified independence  -RB                  Functional Mobility    Functional Mobility- Ind. Level contact guard assist  -RB        Functional Mobility- Device walker, front-wheeled  -RB        Functional Mobility-Distance (Feet) 10  -RB        Functional Mobility-Maintain WBing able to maintain weight bearing status  -RB        Functional Mobility- Safety Issues step length decreased;sequencing ability decreased;weight-shifting ability decreased  -RB                  Transfer Assessment/Treatment    Transfers sit-stand transfer;toilet transfer  -RB                  Transfers    Sit-Stand [place] (Transfers) contact guard  -RB        [place] Level (Toilet Transfer) contact guard  -RB        Assistive Device (Toilet Transfer) grab bars/safety frame;walker, front-wheeled  -RB                  Toilet Transfer    Type (Toilet Transfer) stand-sit;sit-stand  -RB                  Safety Issues, Functional Mobility    Safety Issues Affecting Function (Mobility)  insight into deficits/self-awareness;safety precaution awareness  -RB        Impairments Affecting Function (Mobility) balance;endurance/activity tolerance;pain  -RB                  [REMOVED] Wound 10/14/19 0837 Right hip Incision    Wound - Properties Group Placement Date: 10/14/19  -TB Placement Time: 0837  -TB Present on Hospital Admission: N  -TB Side: Right  -TB Location: hip  -TB Primary Wound Type: Incision  -TB Removal Date: 09/19/22  -MG Removal Time: 0822  -MG, not present on admission  Wound Outcome: Healed  -MG        Retired Wound - Properties Group Placement Date: 10/14/19  -TB Placement Time: 0837  -TB Present on Hospital Admission: N  -TB Side: Right  -TB Location: hip  -TB Primary Wound Type: Incision  -TB Removal Date: 09/19/22  -MG Removal Time: 0822  -MG, not present on admission  Wound Outcome: Healed  -MG        Retired Wound - Properties Group Date first assessed: 10/14/19  -TB Time first assessed: 0837  -TB Present on Hospital Admission: N  -TB Side: Right  -TB Retired Orientation: anterior  -TB Location: hip  -TB Primary Wound Type: Incision  -TB Resolution Date: 09/19/22  -MG Resolution Time: 0822  -MG, not present on admission  Wound Outcome: Healed  -MG                  Wound 09/19/22 1153 Right knee Incision    Wound - Properties Group Placement Date: 09/19/22  -KW Placement Time: 1153  -KW Present on Hospital Admission: N  -KW Side: Right  -KW Location: knee  -KW Primary Wound Type: Incision  -KW        Retired Wound - Properties Group Placement Date: 09/19/22  -KW Placement Time: 1153  -KW Present on Hospital Admission: N  -KW Side: Right  -KW Location: knee  -KW Primary Wound Type: Incision  -KW        Retired Wound - Properties Group Date first assessed: 09/19/22  -KW Time first assessed: 1153  -KW Present on Hospital Admission: N  -KW Side: Right  -KW Location: knee  -KW Primary Wound Type: Incision  -KW                  Plan of Care Review    Plan of Care Reviewed With patient   -RB                  Vital Signs    Pre Systolic BP Rehab 171  -RB        Pre Treatment Diastolic BP 86  -RB        Post Systolic BP Rehab 166  -RB        Post Treatment Diastolic BP 84  -RB        Pretreatment Heart Rate (beats/min) 56  -RB        Posttreatment Heart Rate (beats/min) 56  -RB        Pre SpO2 (%) 99  -RB        O2 Delivery Pre Treatment room air  -RB        Post SpO2 (%) 99  -RB        O2 Delivery Post Treatment room air  -RB        Pre Patient Position Supine  -RB        Intra Patient Position Standing  -RB        Post Patient Position Supine  -RB                  Positioning and Restraints    Pre-Treatment Position in bed  -RB        Post Treatment Position bed  -RB        In Bed supine;call light within reach;with family/caregiver  -RB                  Therapy Assessment/Plan (OT)    Date of Referral to OT 09/19/22  -RB        Functional Level at Time of Evaluation (OT) Imp mob and ADLs.  -RB        OT Diagnosis Imp mob and ADLs.  -RB        Rehab Potential (OT) good, to achieve stated therapy goals  -RB        Criteria for Skilled Therapeutic Interventions Met (OT) yes;meets criteria  -RB        Therapy Frequency (OT) daily  -RB        Predicted Duration of Therapy Intervention (OT) Until D/C.  -RB        Problem List (OT) problems related to;balance;coordination;mobility;strength;inability to direct their own care  -RB        Activity Limitations Related to Problem List (OT) unable to ambulate safely;unable to transfer safely;BADLs not performed adequately or safely;IADLs not performed adequately or safely  -RB        Planned Therapy Interventions (OT) activity tolerance training;adaptive equipment training;BADL retraining;functional balance retraining;patient/caregiver education/training;ROM/therapeutic exercise;strengthening exercise;transfer/mobility retraining;occupation/activity based interventions  -RB                  Evaluation Complexity (OT)    Review Occupational  Profile/Medical/Therapy History Complexity brief/low complexity  -RB        Assessment, Occupational Performance/Identification of Deficit Complexity 1-3 performance deficits  -RB        Clinical Decision Making Complexity (OT) problem focused assessment/low complexity  -RB        Overall Complexity of Evaluation (OT) low complexity  -RB                  Therapy Plan Review/Discharge Plan (OT)    Anticipated Discharge Disposition (OT) home with outpatient therapy services  -RB                  OT Goals    Transfer Goal Selection (OT) transfer, OT goal 1  -RB        Bathing Goal Selection (OT) bathing, OT goal 1  -RB        Dressing Goal Selection (OT) dressing, OT goal 1  -RB        Toileting Goal Selection (OT) toileting, OT goal 1  -RB                  Transfer Goal 1 (OT)    Activity/Assistive Device (Transfer Goal 1, OT) transfers, all  -RB        [place] Level/Cues Needed (Transfer Goal 1, OT) modified independence  -RB        Time Frame (Transfer Goal 1, OT) long term goal (LTG)  -RB        Progress/Outcome (Transfer Goal 1, OT) goal not met  -RB                  Bathing Goal 1 (OT)    Activity/Device (Bathing Goal 1, OT) bathing skills, all  -RB        [place] Level/Cues Needed (Bathing Goal 1, OT) supervision required  -RB        Time Frame (Bathing Goal 1, OT) long term goal (LTG)  -RB        Progress/Outcomes (Bathing Goal 1, OT) goal not met  -RB                  Dressing Goal 1 (OT)    Activity/Device (Dressing Goal 1, OT) dressing skills, all  -RB        [place]/Cues Needed (Dressing Goal 1, OT) modified independence  -RB        Time Frame (Dressing Goal 1, OT) long term goal (LTG)  -RB        Progress/Outcome (Dressing Goal 1, OT) goal not met  -RB                  Toileting Goal 1 (OT)    Activity/Device (Toileting Goal 1, OT) toileting skills, all  -RB        [place] Level/Cues Needed (Toileting Goal 1, OT) modified independence  -RB        Time Frame (Toileting Goal 1, OT) short  term goal (STG)  -RB        Progress/Outcome (Toileting Goal 1, OT) goal not met  -RB              User Key  (r) = Recorded By, (t) = Taken By, (c) = Cosigned By    Initials Name Effective Dates    RB Manny Felix OT 06/16/21 -     Mary Rivera RN 10/17/16 - 06/15/21    Regan Pedraza RN 10/17/16 - 08/06/20    Lauren Milian RN 06/16/21 -                  Occupational Therapy Education                 Title: PT OT SLP Therapies (In Progress)     Topic: Occupational Therapy (In Progress)     Point: ADL training (Not Started)     Description:   Instruct learner(s) on proper safety adaptation and remediation techniques during self care or transfers.   Instruct in proper use of assistive devices.              Learner Progress:  Not documented in this visit.          Point: Home exercise program (Not Started)     Description:   Instruct learner(s) on appropriate technique for monitoring, assisting and/or progressing therapeutic exercises/activities.              Learner Progress:  Not documented in this visit.          Point: Precautions (Done)     Description:   Instruct learner(s) on prescribed precautions during self-care and functional transfers.              Learning Progress Summary           Patient Acceptance, E, VU by RB at 9/19/2022 7568    Comment: Edu pt on use of gait belt and non skid socks when OOB and no OOB without assist.                   Point: Body mechanics (Not Started)     Description:   Instruct learner(s) on proper positioning and spine alignment during self-care, functional mobility activities and/or exercises.              Learner Progress:  Not documented in this visit.                      User Key     Initials Effective Dates Name Provider Type Discipline     06/16/21 -  Manny Felix OT Occupational Therapist OT                  OT Recommendation and Plan  Planned Therapy Interventions (OT): activity tolerance training, adaptive equipment training, BADL retraining,  functional balance retraining, patient/caregiver education/training, ROM/therapeutic exercise, strengthening exercise, transfer/mobility retraining, occupation/activity based interventions  Therapy Frequency (OT): daily  Plan of Care Review  Plan of Care Reviewed With: patient  Plan of Care Reviewed With: patient     Outcome Measures     Row Name 09/19/22 1544             How much help from another is currently needed...    Putting on and taking off regular lower body clothing? 3  -RB      Bathing (including washing, rinsing, and drying) 3  -RB      Toileting (which includes using toilet bed pan or urinal) 3  -RB      Putting on and taking off regular upper body clothing 4  -RB      Taking care of personal grooming (such as brushing teeth) 4  -RB      Eating meals 4  -RB      AM-PAC 6 Clicks Score (OT) 21  -RB              Functional Assessment    Outcome Measure Options AM-PAC 6 Clicks Daily Activity (OT)  -RB            User Key  (r) = Recorded By, (t) = Taken By, (c) = Cosigned By    Initials Name Provider Type    RB Manny Felxi OT Occupational Therapist                Time Calculation:    Time Calculation- OT     Row Name 09/19/22 1642 09/19/22 1545          Time Calculation- OT    OT Start Time 1544  -RB --     OT Stop Time 1616  -RB --     OT Time Calculation (min) 32 min  -RB --     OT Received On 09/19/22  -RB --     OT Goal Re-Cert Due Date 10/02/22  -RB --            Timed Charges    27464 - Gait Training Minutes  -- 15  -LR            Total Minutes    Timed Charges Total Minutes -- 15  -LR      Total Minutes -- 15  -LR           User Key  (r) = Recorded By, (t) = Taken By, (c) = Cosigned By    Initials Name Provider Type    RB Manny Felix OT Occupational Therapist    LR Chris Bustos Physical Therapist              Therapy Charges for Today     Code Description Service Date Service Provider Modifiers Qty    [id number] HC OT EVAL MOD COMPLEXITY 2 9/19/2022 Manny Felix OT GO 1                Manny Felix, OT  9/19/2022

## 2022-09-19 NOTE — PLAN OF CARE
Goal Outcome Evaluation:  Plan of Care Reviewed With: patient           Outcome Evaluation: PT tx completed. Dressing change completed and MD okay with ambulation and steps. [place] for supine<>sit transfer with HOB up and bed rails. CGA for sit<>stand x4 with RW. CGA for ambulation 90'x1 and 15'x1 with RW. CGA for up/down 1 curb step x2. Once with PT and once with wife. Educated patient how to go up/down stairs and use LLE to assist RLE into bed. Anticipate home with assist and OPPT.

## 2022-09-21 ENCOUNTER — HOSPITAL ENCOUNTER (OUTPATIENT)
Dept: PHYSICAL THERAPY | Facility: HOSPITAL | Age: 67
Setting detail: THERAPIES SERIES
Discharge: HOME | End: 2022-09-21

## 2022-09-21 DIAGNOSIS — M17.11 PRIMARY OSTEOARTHRITIS OF RIGHT KNEE: Primary | ICD-10-CM

## 2022-09-21 DIAGNOSIS — Z96.641 PRESENCE OF RIGHT HIP IMPLANT: ICD-10-CM

## 2022-09-21 DIAGNOSIS — M17.0 PRIMARY OSTEOARTHRITIS OF BOTH KNEES: ICD-10-CM

## 2022-09-21 DIAGNOSIS — Z74.09 IMPAIRED FUNCTIONAL MOBILITY, BALANCE, GAIT, AND ENDURANCE: ICD-10-CM

## 2022-09-21 LAB — REF LAB TEST METHOD: NORMAL

## 2022-09-21 PROCEDURE — 97163 PT EVAL HIGH COMPLEX 45 MIN: CPT | Performed by: PHYSICAL THERAPIST

## 2022-09-21 NOTE — THERAPY EVALUATION
"    Outpatient Physical Therapy Ortho Initial Evaluation  [facility]     Patient Name: Sam Infante  : 1955  MRN: [id number]  Today's Date: 2022      Visit Date: 2022    Attendance:  (authorization required)  Subjective Improvement: n/a  Next MD Appt: 10/4/22  Recert Date: 22    Therapy Diagnosis: R total knee arthroplasty, 22       Past Medical History:   Diagnosis Date   • Arthritis    • Elevated cholesterol    • GERD (gastroesophageal reflux disease)     OCCASIONAL   • High blood pressure    • Kidney stone    • Sleep apnea     using c-pap   • Vitreous floater 2013        Past Surgical History:   Procedure Laterality Date   • COLONOSCOPY     • SEPTOPLASTY  2013    Nasal surgery procedure (Nasal septoplasty.)   • TOTAL HIP ARTHROPLASTY Right 10/14/2019    Procedure: TOTAL HIP ARTHROPLASTY ANTERIOR;  Surgeon: Nathaniel Kate MD;  Location: [facility];  Service: Orthopedics     Allergies   Allergen Reactions   • Statins Other (See Comments)     Muscle pain       Visit Dx:     ICD-10-CM ICD-9-CM   1. Primary osteoarthritis of right knee  M17.11 715.16   2. Impaired functional mobility, balance, gait, and endurance  Z74.09 V49.89   3. Primary osteoarthritis of both knees  M17.0 715.16   4. Presence of right hip implant  Z96.641 V43.64          Patient History     Row Name 22 0800             History    Chief Complaint Difficulty Walking;Difficulty with daily activities;Pain  -SS      Type of Pain Knee pain  right  -SS      Brief Description of Current Complaint Patient underwent a right total knee arthroplasty on 22 due to arthritis. Arthroplasty performed as an outpatient. Not doing too bad at present. Knee pain if up on feet for any length of time.  male. Lives in a 3 story house with 2 steps to enter and 14 steps between each level.  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 Hobbies: paint  -SS      Surgery/Hospitalization 9/19/22 -- outpatient R knee TKA  -SS              Pain     Pain Location Knee  right  -SS      Pain at Present 1  -SS      Pain at Best 0  since surgery  -SS      Pain at Worst 7;8  since surgery  -SS      Pain Description Burning;Shooting  -SS      What Performance Factors Make the Current Problem(s) WORSE? weightbearing, bending knee, transferring between sitting and standing  -SS      What Performance Factors Make the Current Problem(s) BETTER? ice and elevation  -SS      Is your sleep disturbed? No  -SS      Is medication used to assist with sleep? Yes  -SS      Difficulties at work? off work at present  -SS      Difficulties with ADL's? decreased  -SS      Difficulties with recreational activities? decreased  -SS              Fall Risk Assessment    Any falls in the past year: No  -SS      Does patient have a fear of falling No  -SS              Daily Activities    Primary Language English  -              Safety    Are you being hurt, hit, or frightened by anyone at home or in your life? No  -SS      Have you had any of the following issues with N/A  -SS            User Key  (r) = Recorded By, (t) = Taken By, (c) = Cosigned By    Initials Name Provider Type    Tang Kapadia PT DPT Physical Therapist                 PT Ortho     Row Name 09/21/22 0800       Subjective Comments    Subjective Comments see Therapy Patient History  -SS       Precautions and Contraindications    Precautions R TKA 9/19/22  -SS    Contraindications history of R total hip 2019  -SS       Subjective Pain    Able to rate subjective pain? yes  -SS    Pre-Treatment Pain Level 1  -SS    Post-Treatment Pain Level 2   "-       Posture/Observations    Posture/Observations Comments Presents this date ambulating with rolling walker. Antalgic gait. Ace wrap on R knee. Post-op bandage saturated with blood. Stapled incision anterior R knee. Inferior knee bleeding.  -       General ROM    RT Lower Ext Rt Knee Extension/Flexion  -       Right Lower Ext    Rt Knee Extension/Flexion AROM 0-10-65 deg, \"a little sore\"  -       MMT (Manual Muscle Testing)    General MMT Comments MMT deferred  -       Sensation    Sensation WNL? WNL  -          User Key  (r) = Recorded By, (t) = Taken By, (c) = Cosigned By    Initials Name Provider Type    Tang Kapadia, PT DPT Physical Therapist                            Therapy Education  Education Details: heel prop, seated and supine AROM  Given: HEP  Program: New  How Provided: Verbal, Demonstration  Provided to: Patient  Level of Understanding: Verbalized      PT OP Goals     Row Name 09/21/22 0800          PT Short Term Goals    STG Date to Achieve 10/12/22  -     STG 1 Note a >/= 50% subjective improvement.  -     STG 2 LEFS score to be >/= 40/80.  -     STG 3 R knee active extension to 0 deg.  -     STG 4 R knee active flexion to be >/= 100 deg.  -     STG 5 Demonstrate ability to ambulate household distances or greater with cane.  -            Long Term Goals    LTG Date to Achieve 11/16/22  -     LTG 1 Independent with HEP/self-management.  -     LTG 2 LEFS score to be >/= 60/80.  -     LTG 3 R knee active flexion to be >/= 120 deg.  -     LTG 4 Demonstrate ability to ambulate community distances on firm, level surfaces without assistive device.  -     LTG 5 Demonstrate ability to ascend 1 flight of stairs step-over-step with HR use PRN.  -            Time Calculation    PT Goal Re-Cert Due Date 10/12/22  -           User Key  (r) = Recorded By, (t) = Taken By, (c) = Cosigned By    Initials Name Provider Type    Tang Kapadia, PT DPT " Physical Therapist                 PT Assessment/Plan     Row Name 09/21/22 0800          PT Assessment    Functional Limitations Impaired gait;Limitation in home management;Limitations in community activities;Limitations in functional capacity and performance;Performance in leisure activities;Performance in self-care ADL;Performance in work activities  -     Impairments Balance;Edema;Endurance;Gait;Integumentary integrity;Muscle strength;Pain;Range of motion  -     Assessment Comments Patient is 2 days s/p R total knee arthroplasty on 9/19/22. Redressed abd rebandaged his wound post-treatment.  -     Rehab Potential Good  -     Patient/caregiver participated in establishment of treatment plan and goals Yes  -SS     Patient would benefit from skilled therapy intervention Yes  -SS            PT Plan    PT Frequency 3x/week  -     Predicted Duration of Therapy Intervention (PT) 6-8 weeks  -     Planned CPT's? PT EVAL HIGH COMPLEXITY: 00457;PT THER PROC EA 15 MIN: 27224;PT THER ACT EA 15 MIN: 53816;PT MANUAL THERAPY EA 15 MIN: 75503;PT GAIT TRAINING EA 15 MIN: 00958;PT HOT OR COLD PACK TREAT MCARE;PT ELECTRICAL STIM UNATTEND:   -     PT Plan Comments ROM, stretching, strengthening, balance training, gait training, ice with or without IFC estim as needed for pain.  -           User Key  (r) = Recorded By, (t) = Taken By, (c) = Cosigned By    Initials Name Provider Type     Tang Cole, PT DPT Physical Therapist                                    Outcome Measure Options: Lower Extremity Functional Scale (LEFS)  Lower Extremity Functional Index  Any of your usual work, housework or school activities: Quite a bit of difficulty  Your usual hobbies, recreational or sporting activities: Quite a bit of difficulty  Getting into or out of the bath: Extreme difficulty or unable to perform activity  Walking between rooms: Moderate difficulty  Putting on your shoes or socks: Moderate  difficulty  Squatting: Quite a bit of difficulty  Lifting an object, like a bag of groceries from the floor: Quite a bit of difficulty  Performing light activities around your home: Moderate difficulty  Performing heavy activities around your home: Extreme difficulty or unable to perform activity  Getting into or out of a car: Quite a bit of difficulty  Walking 2 blocks: Quite a bit of difficulty  Walking a mile: Extreme difficulty or unable to perform activity  Going up or down 10 stairs (about 1 flight of stairs): Quite a bit of difficulty  Standing for 1 hour: Extreme difficulty or unable to perform activity  Sitting for 1 hour: Moderate difficulty  Running on even ground: Extreme difficulty or unable to perform activity  Running on uneven ground: Extreme difficulty or unable to perform activity  Making sharp turns while running fast: Extreme difficulty or unable to perform activity  Hopping: Extreme difficulty or unable to perform activity  Rolling over in bed: Quite a bit of difficulty  Total: 16      Time Calculation:     Start Time: 0807  Stop Time: 0840  Time Calculation (min): 33 min     Therapy Charges for Today     Code Description Service Date Service Provider Modifiers Qty    85955744830 HC PT EVAL HIGH COMPLEXITY 2 9/21/2022 Tang Cole, PT DPT GP 1                   Tang Cole, PT, DPT, CHT  9/21/2022

## 2022-09-26 ENCOUNTER — HOSPITAL ENCOUNTER (OUTPATIENT)
Dept: PHYSICAL THERAPY | Facility: HOSPITAL | Age: 67
Setting detail: THERAPIES SERIES
Discharge: HOME | End: 2022-09-26

## 2022-09-26 DIAGNOSIS — M17.11 PRIMARY OSTEOARTHRITIS OF RIGHT KNEE: Primary | ICD-10-CM

## 2022-09-26 DIAGNOSIS — Z74.09 IMPAIRED FUNCTIONAL MOBILITY, BALANCE, GAIT, AND ENDURANCE: ICD-10-CM

## 2022-09-26 PROCEDURE — G0283 ELEC STIM OTHER THAN WOUND: HCPCS

## 2022-09-26 PROCEDURE — 97110 THERAPEUTIC EXERCISES: CPT

## 2022-09-26 NOTE — THERAPY TREATMENT NOTE
Outpatient Physical Therapy Ortho Treatment Note  [facility]     Patient Name: Sam nIfante  : 1955  MRN: [id number]  Today's Date: 2022      Visit Date: 2022     Subjective Improvement not sure  Visits 2/2  Visits approved med [facility]  RTMD 10-  Recert Date 2022    Therapy Diagnosis: R total knee arthroplasty, 22    Visit Dx:    ICD-10-CM ICD-9-CM   1. Primary osteoarthritis of right knee  M17.11 715.16   2. Impaired functional mobility, balance, gait, and endurance  Z74.09 V49.89       Patient Active Problem List   Diagnosis   • Borderline glaucoma, open angle with borderline findings   • Borderline glaucoma   • Left hand pain   • Bilateral carpal tunnel syndrome   • Right hip pain   • Primary osteoarthritis of right hip   • Essential hypertension   • Attention deficit hyperactivity disorder   • Benign prostatic hyperplasia   • Fatigue   • Gastroesophageal reflux disease without esophagitis   • Hyperlipidemia   • Mixed anxiety and depressive disorder   • BERNADETTE (obstructive sleep apnea)   • Routine physical examination   • Hx of total hip arthroplasty, right   • Right shoulder pain   • Rotator cuff impingement syndrome of right shoulder   • Impingement syndrome of right shoulder   • Presence of right hip implant   • Primary osteoarthritis of both knees   • Primary osteoarthritis of right knee   • Chronic pain of right knee        Past Medical History:   Diagnosis Date   • Arthritis    • Elevated cholesterol    • GERD (gastroesophageal reflux disease)     OCCASIONAL   • High blood pressure    • Kidney stone    • Sleep apnea     using c-pap   • Vitreous floater 2013        Past Surgical History:   Procedure Laterality Date   • COLONOSCOPY     • SEPTOPLASTY  2013    Nasal surgery procedure (Nasal septoplasty.)   • TOTAL HIP ARTHROPLASTY Right 10/14/2019    Procedure: TOTAL HIP ARTHROPLASTY ANTERIOR;  Surgeon: Nathaniel Kate MD;  Location: [facility] OR;   Service: Orthopedics        PT Ortho     Row Name 09/26/22 1400       Precautions and Contraindications    Precautions/Limitations --  R BOUCHRA on 2019  -CP    Precautions R TKA 9-  -CP    Contraindications 1 wk post op  -CP       Subjective Pain    Able to rate subjective pain? yes  -CP    Pre-Treatment Pain Level 3  -CP    Subjective Pain Comment pain pill 12 pm  -CP       Posture/Observations    Posture/Observations Comments amb with w/w/  -CP       Right Lower Ext    Rt Knee Extension/Flexion AROM 0-03-78  -CP          User Key  (r) = Recorded By, (t) = Taken By, (c) = Cosigned By    Initials Name Provider Type    Adwoa Kennedy, PTA Physical Therapist Assistant                             PT Assessment/Plan     Row Name 09/26/22 1520          PT Assessment    Assessment Comments Edema noted in right lower leg.  No drainage from incision,  Patient did have increase AROM for both ext and fl.  All HEP reviewed with patient and stressed the need to be compliant with HEP.  Patient is very pleasand and willing to try  -CP            PT Plan    PT Frequency 3x/week  -CP     Predicted Duration of Therapy Intervention (PT) 6-8 weeks  -CP     PT Plan Comments Cont with POC.  side stepping at table  -CP           User Key  (r) = Recorded By, (t) = Taken By, (c) = Cosigned By    Initials Name Provider Type    Adwoa Kennedy, PTA Physical Therapist Assistant                 Modalities     Row Name 09/26/22 1400             Ice    Ice Applied Yes  -CP      Location right knee with IFC  -CP      PT Ice Rx Minutes 15  -CP      Ice S/P Rx Yes  -CP              ELECTRICAL STIMULATION    Attended/Unattended Unattended  -CP      Stimulation Type IFC  -CP      Location/Electrode Placement/Other right knee with ice  -CP      PT E-Stim Unattended Minutes 15  -CP            User Key  (r) = Recorded By, (t) = Taken By, (c) = Cosigned By    Initials Name Provider Type    Adwoa Kennedy PTA Physical Therapist  "Assistant               OP Exercises     Row Name 09/26/22 1533 09/26/22 1400          Subjective Pain    Able to rate subjective pain? -- yes  -CP     Pre-Treatment Pain Level -- 3  -CP     Post-Treatment Pain Level -- --  numb from ice and ifc  -CP     Subjective Pain Comment -- pain pill 12 pm  -CP            Total Minutes    10020 - PT Therapeutic Exercise Minutes 40  -CP --            Exercise 1    Exercise Name 1 -- Pro II level 1  -CP     Time 1 -- 10  -CP     Additional Comments -- rocking seat 12  -CP            Exercise 2    Exercise Name 2 -- incline stretch  -CP     Cueing 2 -- Verbal;Demo  -CP     Sets 2 -- 3  -CP     Time 2 -- 30\" holds  -CP            Exercise 3    Exercise Name 3 -- standing HS stretch  -CP     Cueing 3 -- Verbal;Demo  -CP     Sets 3 -- 3  -CP     Time 3 -- 30\" holds  -CP            Exercise 4    Exercise Name 4 -- CR/TR  -CP     Cueing 4 -- Verbal;Demo  -CP     Sets 4 -- 2  -CP     Reps 4 -- 10  -CP            Exercise 5    Exercise Name 5 -- marching in w/w/  -CP     Cueing 5 -- Verbal;Demo  -CP     Sets 5 -- 2  -CP     Reps 5 -- 10  -CP     Time 5 -- B LE  -CP            Exercise 6    Exercise Name 6 -- seated heelsldies for AROM  -CP     Cueing 6 -- Verbal;Demo;Tactile  -CP     Sets 6 -- 2  -CP     Reps 6 -- 10  -CP            Exercise 7    Exercise Name 7 -- seated quad set knee ext stretch  -CP     Cueing 7 -- Verbal;Demo  -CP     Sets 7 -- 2  -CP     Reps 7 -- 10  -CP     Time 7 -- 5\" holds  -CP            Exercise 8    Exercise Name 8 -- heelslides with strap  -CP     Cueing 8 -- Verbal;Demo;Tactile  -CP     Sets 8 -- 3  -CP     Reps 8 -- 10  -CP            Exercise 9    Exercise Name 9 -- SAQ  -CP     Cueing 9 -- Verbal;Tactile  -CP     Sets 9 -- 2  -CP     Reps 9 -- 10  -CP            Exercise 10    Exercise Name 10 -- quad set with heel prop  -CP           User Key  (r) = Recorded By, (t) = Taken By, (c) = Cosigned By    Initials Name Provider Type    Adwoa Kennedy, " PTA Physical Therapist Assistant                              PT OP Goals     Row Name 09/26/22 1500          PT Short Term Goals    STG Date to Achieve 10/12/22  -CP     STG 1 Note a >/= 50% subjective improvement.  -CP     STG 2 LEFS score to be >/= 40/80.  -CP     STG 3 R knee active extension to 0 deg.  -CP     STG 4 R knee active flexion to be >/= 100 deg.  -CP     STG 5 Demonstrate ability to ambulate household distances or greater with cane.  -CP            Long Term Goals    LTG Date to Achieve 11/16/22  -CP     LTG 1 Independent with HEP/self-management.  -CP     LTG 2 LEFS score to be >/= 60/80.  -CP     LTG 3 R knee active flexion to be >/= 120 deg.  -CP     LTG 4 Demonstrate ability to ambulate community distances on firm, level surfaces without assistive device.  -CP     LTG 5 Demonstrate ability to ascend 1 flight of stairs step-over-step with HR use PRN.  -CP            Time Calculation    PT Goal Re-Cert Due Date 10/12/22  -CP           User Key  (r) = Recorded By, (t) = Taken By, (c) = Cosigned By    Initials Name Provider Type    CP Adwoa May, PTA Physical Therapist Assistant                Therapy Education  Education Details: CR, marching in w/w. seated qs for knee ext, seated heelslides with knee fl, heelslides with strap, QS with heelprop, SAQ  Given: HEP  Program: New  How Provided: Verbal, Demonstration, Written  Provided to: Patient  Level of Understanding: Teach back education performed, Verbalized, Demonstrated              Time Calculation:   Start Time: 1430  Stop Time: 1530  Time Calculation (min): 60 min  Total Timed Code Minutes- PT: 40 minute(s)  Timed Charges  45766 - PT Therapeutic Exercise Minutes: 40  Untimed Charges  PT E-Stim Unattended Minutes: 15  PT Ice Rx Minutes: 15  Total Minutes  Timed Charges Total Minutes: 40  Untimed Charges Total Minutes: 30   Total Minutes: 40  Therapy Charges for Today     Code Description Service Date Service Provider Modifiers Qty     06427967655 HC PT ELECTRICAL STIM UNATTENDED 9/26/2022 Adwoa May, PTA CQ 1    19368646982 HC PT THER PROC EA 15 MIN 9/26/2022 Adwoa May PTA GP, CQ 3                    Adwoa May, PTA  9/26/2022

## 2022-09-28 ENCOUNTER — HOSPITAL ENCOUNTER (OUTPATIENT)
Dept: PHYSICAL THERAPY | Facility: HOSPITAL | Age: 67
Setting detail: THERAPIES SERIES
Discharge: HOME | End: 2022-09-28

## 2022-09-28 DIAGNOSIS — M17.11 PRIMARY OSTEOARTHRITIS OF RIGHT KNEE: Primary | ICD-10-CM

## 2022-09-28 PROCEDURE — G0283 ELEC STIM OTHER THAN WOUND: HCPCS

## 2022-09-28 PROCEDURE — 97110 THERAPEUTIC EXERCISES: CPT

## 2022-09-28 NOTE — THERAPY TREATMENT NOTE
Outpatient Physical Therapy Ortho Treatment Note  [facility]     Patient Name: Sam Infante  : 1955  MRN: [id number]  Today's Date: 2022      Visit Date: 2022     Sujbective Improvement not sure  Visits 3/3  Visit saproved med  ec  RTMD 10-  Recert Date 2022    Visit Dx:    ICD-10-CM ICD-9-CM   1. Primary osteoarthritis of right knee  M17.11 715.16       Patient Active Problem List   Diagnosis   • Borderline glaucoma, open angle with borderline findings   • Borderline glaucoma   • Left hand pain   • Bilateral carpal tunnel syndrome   • Right hip pain   • Primary osteoarthritis of right hip   • Essential hypertension   • Attention deficit hyperactivity disorder   • Benign prostatic hyperplasia   • Fatigue   • Gastroesophageal reflux disease without esophagitis   • Hyperlipidemia   • Mixed anxiety and depressive disorder   • BERNADETTE (obstructive sleep apnea)   • Routine physical examination   • Hx of total hip arthroplasty, right   • Right shoulder pain   • Rotator cuff impingement syndrome of right shoulder   • Impingement syndrome of right shoulder   • Presence of right hip implant   • Primary osteoarthritis of both knees   • Primary osteoarthritis of right knee   • Chronic pain of right knee        Past Medical History:   Diagnosis Date   • Arthritis    • Elevated cholesterol    • GERD (gastroesophageal reflux disease)     OCCASIONAL   • High blood pressure    • Kidney stone    • Sleep apnea     using c-pap   • Vitreous floater 2013        Past Surgical History:   Procedure Laterality Date   • COLONOSCOPY     • SEPTOPLASTY  2013    Nasal surgery procedure (Nasal septoplasty.)   • TOTAL HIP ARTHROPLASTY Right 10/14/2019    Procedure: TOTAL HIP ARTHROPLASTY ANTERIOR;  Surgeon: Nathaniel Kate MD;  Location: [facility];  Service: Orthopedics   • TOTAL KNEE ARTHROPLASTY Right 2022    Procedure: RIGHT TOTAL KNEE ARTHROPLASTY WITH ADDUCTOR CANAL BLOCK;   "Surgeon: Nathaniel Kate MD;  Location: [facility];  Service: Orthopedics;  Laterality: Right;        PT Ortho     Row Name 09/28/22 1100       Precautions and Contraindications    Precautions R TKA 9-  -CP    Contraindications 1 wk post op  -CP       Subjective Pain    Able to rate subjective pain? yes  -CP    Pre-Treatment Pain Level 3  -CP       Posture/Observations    Posture/Observations Comments amb with w/w/  -CP       Right Lower Ext    Rt Knee Extension/Flexion AROM 0-05-85  -CP          User Key  (r) = Recorded By, (t) = Taken By, (c) = Cosigned By    Initials Name Provider Type    Adwoa Kennedy, PTA Physical Therapist Assistant                             PT Assessment/Plan     Row Name 09/28/22 1122          PT Assessment    Assessment Comments Patient demo safe amb with SPC.  He did have increase AROM this date.  He is progressing very well.  -CP            PT Plan    PT Frequency 3x/week  -CP     Predicted Duration of Therapy Intervention (PT) 6-8 weeks  -CP     PT Plan Comments Cont with POC  step up 4\" next  -CP           User Key  (r) = Recorded By, (t) = Taken By, (c) = Cosigned By    Initials Name Provider Type    Adwoa Kennedy, PTA Physical Therapist Assistant                 Modalities     Row Name 09/28/22 1100             Ice    Ice Applied Yes  -CP      Location right knee with IFC  -CP      PT Ice Rx Minutes 15  -CP      Ice S/P Rx Yes  -CP              ELECTRICAL STIMULATION    Attended/Unattended Unattended  -CP      Stimulation Type IFC  -CP      Location/Electrode Placement/Other right knee with ice  -CP      PT E-Stim Unattended Minutes 15  -CP            User Key  (r) = Recorded By, (t) = Taken By, (c) = Cosigned By    Initials Name Provider Type    Adwoa Kennedy, PTA Physical Therapist Assistant               OP Exercises     Row Name 09/28/22 1208 09/28/22 1100          Subjective Comments    Subjective Comments -- Ptient states that he doesnt use " "his walker very much  -CP            Subjective Pain    Able to rate subjective pain? -- yes  -CP     Pre-Treatment Pain Level -- 3  -CP            Total Minutes    62001 - PT Therapeutic Exercise Minutes 45  -CP --            Exercise 1    Exercise Name 1 -- Pro II level 1  -CP     Time 1 -- 12  -CP            Exercise 2    Exercise Name 2 -- gait training with SPC  -CP     Reps 2 -- 270 ft  -CP            Exercise 3    Exercise Name 3 -- incline stretch  -CP     Cueing 3 -- Verbal  -CP     Sets 3 -- 3  -CP     Time 3 -- 30  -CP            Exercise 4    Exercise Name 4 -- CR/TR  -CP     Cueing 4 -- Verbal  -CP     Sets 4 -- 2  -CP     Reps 4 -- 10  -CP            Exercise 5    Exercise Name 5 -- side stepping at taping table  -CP     Cueing 5 -- Verbal  -CP     Reps 5 -- 5  -CP     Time 5 -- handrail assist as needed  -CP            Exercise 6    Exercise Name 6 -- ham sets  -CP     Cueing 6 -- Verbal  -CP     Sets 6 -- 2  -CP     Reps 6 -- 10  -CP     Time 6 -- 5\" holds  -CP            Exercise 7    Exercise Name 7 -- heelslides with strap  -CP     Cueing 7 -- Verbal;Tactile  -CP     Reps 7 -- 30  -CP            Exercise 8    Exercise Name 8 -- QS with heel prop  -CP     Cueing 8 -- Verbal  -CP     Sets 8 -- 2  -CP     Reps 8 -- 10  -CP     Time 8 -- 5\" holds  -CP            Exercise 9    Exercise Name 9 -- SLR  -CP     Cueing 9 -- Verbal;Tactile  -CP     Sets 9 -- 2  -CP     Reps 9 -- 10  -CP           User Key  (r) = Recorded By, (t) = Taken By, (c) = Cosigned By    Initials Name Provider Type    CP Adwoa May, PTA Physical Therapist Assistant                                 Therapy Education  Education Details: side stepping at kitchen counter, SLR, ham sets  Given: HEP  Program: New  How Provided: Verbal, Demonstration  Provided to: Patient  Level of Understanding: Teach back education performed, Verbalized, Demonstrated              Time Calculation:   Start Time: 1158  Stop Time: 1258  Time " Calculation (min): 60 min  Total Timed Code Minutes- PT: 45 minute(s)  Timed Charges  55120 - PT Therapeutic Exercise Minutes: 45  Untimed Charges  PT E-Stim Unattended Minutes: 15  PT Ice Rx Minutes: 15  Total Minutes  Timed Charges Total Minutes: 45  Untimed Charges Total Minutes: 30   Total Minutes: 45  Therapy Charges for Today     Code Description Service Date Service Provider Modifiers Qty    65760136386 HC PT THER PROC EA 15 MIN 9/28/2022 Adwoa May PTA GP, CQ 3    58198214626 HC PT ELECTRICAL STIM UNATTENDED 9/28/2022 Adwoa May, PTA CQ 1                    Adwoa May PTA  9/28/2022

## 2022-09-29 ENCOUNTER — HOSPITAL ENCOUNTER (OUTPATIENT)
Dept: PHYSICAL THERAPY | Facility: HOSPITAL | Age: 67
Setting detail: THERAPIES SERIES
Discharge: HOME | End: 2022-09-29

## 2022-09-29 DIAGNOSIS — Z74.09 IMPAIRED FUNCTIONAL MOBILITY, BALANCE, GAIT, AND ENDURANCE: ICD-10-CM

## 2022-09-29 DIAGNOSIS — M17.11 PRIMARY OSTEOARTHRITIS OF RIGHT KNEE: Primary | ICD-10-CM

## 2022-09-29 DIAGNOSIS — Z96.651 S/P TKR (TOTAL KNEE REPLACEMENT), RIGHT: Primary | ICD-10-CM

## 2022-09-29 PROCEDURE — G0283 ELEC STIM OTHER THAN WOUND: HCPCS

## 2022-09-29 PROCEDURE — 97110 THERAPEUTIC EXERCISES: CPT

## 2022-09-29 NOTE — THERAPY TREATMENT NOTE
Outpatient Physical Therapy Ortho Treatment Note  [facility]     Patient Name: Sam Infante  : 1955  MRN: [id number]  Today's Date: 2022      Visit Date: 2022     Subjective Improvement better  Visits 4/4  Visits approved med nec  RTMD 10- MD note faxed today  Recert Date 2022    S/P R TKA on 2022    Visit Dx:    ICD-10-CM ICD-9-CM   1. Primary osteoarthritis of right knee  M17.11 715.16   2. Impaired functional mobility, balance, gait, and endurance  Z74.09 V49.89       Patient Active Problem List   Diagnosis   • Borderline glaucoma, open angle with borderline findings   • Borderline glaucoma   • Left hand pain   • Bilateral carpal tunnel syndrome   • Right hip pain   • Primary osteoarthritis of right hip   • Essential hypertension   • Attention deficit hyperactivity disorder   • Benign prostatic hyperplasia   • Fatigue   • Gastroesophageal reflux disease without esophagitis   • Hyperlipidemia   • Mixed anxiety and depressive disorder   • BERNADETTE (obstructive sleep apnea)   • Routine physical examination   • Hx of total hip arthroplasty, right   • Right shoulder pain   • Rotator cuff impingement syndrome of right shoulder   • Impingement syndrome of right shoulder   • Presence of right hip implant   • Primary osteoarthritis of both knees   • Primary osteoarthritis of right knee   • Chronic pain of right knee        Past Medical History:   Diagnosis Date   • Arthritis    • Elevated cholesterol    • GERD (gastroesophageal reflux disease)     OCCASIONAL   • High blood pressure    • Kidney stone    • Sleep apnea     using c-pap   • Vitreous floater 2013        Past Surgical History:   Procedure Laterality Date   • COLONOSCOPY     • SEPTOPLASTY  2013    Nasal surgery procedure (Nasal septoplasty.)   • TOTAL HIP ARTHROPLASTY Right 10/14/2019    Procedure: TOTAL HIP ARTHROPLASTY ANTERIOR;  Surgeon: Nathaniel Kate MD;  Location: [facility];  Service:  "Orthopedics   • TOTAL KNEE ARTHROPLASTY Right 9/19/2022    Procedure: RIGHT TOTAL KNEE ARTHROPLASTY WITH ADDUCTOR CANAL BLOCK;  Surgeon: Nathaniel Kate MD;  Location: [facility];  Service: Orthopedics;  Laterality: Right;        PT Ortho     Row Name 09/29/22 1300       Right Lower Ext    Rt Knee Extension/Flexion AROM 0-88  -CP    RT Lower Extremity Comments After ther ex  -CP       MMT (Manual Muscle Testing)    General MMT Comments very slight quad lag with SLR  -CP          User Key  (r) = Recorded By, (t) = Taken By, (c) = Cosigned By    Initials Name Provider Type    Adwoa Kennedy, PTA Physical Therapist Assistant                             PT Assessment/Plan     Row Name 09/29/22 1311          PT Assessment    Assessment Comments Patient is progressing very well.  He does have increase AROM.  Patient is able to safely amb with or without cane.  -CP            PT Plan    PT Frequency 3x/week  -CP     Predicted Duration of Therapy Intervention (PT) 6-8 weeks  -CP     PT Plan Comments Cont with POC.  seated LLPS knee fl stretch, lat step up 4\"  -CP           User Key  (r) = Recorded By, (t) = Taken By, (c) = Cosigned By    Initials Name Provider Type    Adwoa Kennedy, PTA Physical Therapist Assistant                   OP Exercises     Row Name 09/29/22 1312 09/29/22 1300          Subjective Comments    Subjective Comments -- Paient states that he really doesnt use the walker too much any more  -CP            Subjective Pain    Able to rate subjective pain? -- yes  -CP     Pre-Treatment Pain Level -- 4  -CP     Post-Treatment Pain Level -- --  numb from ice and ifc  -CP            Total Minutes    02314 - PT Therapeutic Exercise Minutes 48  -CP --            Exercise 1    Exercise Name 1 -- Pro II level 2  -CP     Time 1 -- 10  -CP            Exercise 2    Exercise Name 2 -- incline stretch  -CP     Cueing 2 -- Verbal  -CP     Sets 2 -- 3  -CP     Time 2 -- 30\" hllds  -CP            " "Exercise 3    Exercise Name 3 -- standing lunge stretch  -CP     Cueing 3 -- Verbal;Demo  -CP     Sets 3 -- 3  -CP     Time 3 -- 30\" holds  -CP            Exercise 4    Exercise Name 4 -- step up 4\"  -CP     Cueing 4 -- Verbal;Demo  -CP     Sets 4 -- 2  -CP     Reps 4 -- 10  -CP            Exercise 5    Exercise Name 5 -- CR/TR  -CP     Cueing 5 -- Verbal  -CP     Sets 5 -- 2  -CP     Reps 5 -- 10  -CP            Exercise 6    Exercise Name 6 -- mini squats  -CP     Cueing 6 -- Verbal;Demo  -CP     Sets 6 -- 2  -CP     Reps 6 -- 10  -CP            Exercise 7    Exercise Name 7 -- heelslides with strap  -CP     Cueing 7 -- Verbal;Demo  -CP     Sets 7 -- 2  -CP     Reps 7 -- 10  -CP            Exercise 8    Exercise Name 8 -- QS  -CP     Cueing 8 -- Verbal  -CP     Sets 8 -- 2  -CP     Reps 8 -- 10  -CP     Time 8 -- 5\" holds  -CP            Exercise 9    Exercise Name 9 -- SLR  -CP     Cueing 9 -- Verbal;Demo  -CP     Sets 9 -- 2  -CP     Reps 9 -- 10  -CP     Time 9 -- 3-5\" holds  -CP           User Key  (r) = Recorded By, (t) = Taken By, (c) = Cosigned By    Initials Name Provider Type    CP Adwoa May, PTA Physical Therapist Assistant                              PT OP Goals     Row Name 09/29/22 1300          PT Short Term Goals    STG Date to Achieve 10/12/22  -CP     STG 1 Note a >/= 50% subjective improvement.  -CP     STG 2 LEFS score to be >/= 40/80.  -CP     STG 3 R knee active extension to 0 deg.  -CP     STG 3 Progress Met  -CP     STG 4 R knee active flexion to be >/= 100 deg.  -CP     STG 5 Demonstrate ability to ambulate household distances or greater with cane.  -CP     STG 5 Progress Met  -CP            Long Term Goals    LTG Date to Achieve 11/16/22  -CP     LTG 1 Independent with HEP/self-management.  -CP     LTG 2 LEFS score to be >/= 60/80.  -CP     LTG 3 R knee active flexion to be >/= 120 deg.  -CP     LTG 4 Demonstrate ability to ambulate community distances on firm, level surfaces " without assistive device.  -CP     LTG 5 Demonstrate ability to ascend 1 flight of stairs step-over-step with HR use PRN.  -CP            Time Calculation    PT Goal Re-Cert Due Date 10/12/22  -CP           User Key  (r) = Recorded By, (t) = Taken By, (c) = Cosigned By    Initials Name Provider Type    CP Adwoa May PTA Physical Therapist Assistant                               Time Calculation:   Start Time: 0930  Stop Time: 1040  Time Calculation (min): 70 min  Total Timed Code Minutes- PT: 48 minute(s)  Timed Charges  94779 - PT Therapeutic Exercise Minutes: 48  Total Minutes  Timed Charges Total Minutes: 48   Total Minutes: 48  Therapy Charges for Today     Code Description Service Date Service Provider Modifiers Qty    75043265641 HC PT ELECTRICAL STIM UNATTENDED 9/29/2022 Adwoa May PTA CQ 1    58394398374 HC PT THER PROC EA 15 MIN 9/29/2022 Adwoa May PTA GP, CQ 3                    Adwoa May PTA  9/29/2022

## 2022-09-30 RX ORDER — NAPROXEN 500 MG/1
TABLET ORAL
Qty: 60 TABLET | Refills: 10 | Status: SHIPPED | OUTPATIENT
Start: 2022-09-30 | End: 2022-12-27 | Stop reason: ALTCHOICE

## 2022-09-30 NOTE — TELEPHONE ENCOUNTER
Called patient to confirm he was requesting a refill of Naproxen   Patient states he has been taking naproxen to help with his knee replacement and thought he had a ton of refills on the prescription.     Right total knee DOS 9/19/2022  Last filled naproxen 8/26/2021 with 11 refills

## 2022-10-03 ENCOUNTER — APPOINTMENT (OUTPATIENT)
Dept: PHYSICAL THERAPY | Facility: HOSPITAL | Age: 67
End: 2022-10-03

## 2022-10-04 ENCOUNTER — HOSPITAL ENCOUNTER (OUTPATIENT)
Dept: PHYSICAL THERAPY | Facility: HOSPITAL | Age: 67
Setting detail: THERAPIES SERIES
Discharge: HOME | End: 2022-10-04

## 2022-10-04 ENCOUNTER — OFFICE VISIT (OUTPATIENT)
Dept: ORTHOPEDIC SURGERY | Facility: CLINIC | Age: 67
End: 2022-10-04

## 2022-10-04 VITALS — HEIGHT: 66 IN | WEIGHT: 202 LBS | BODY MASS INDEX: 32.47 KG/M2

## 2022-10-04 DIAGNOSIS — M17.0 PRIMARY OSTEOARTHRITIS OF BOTH KNEES: ICD-10-CM

## 2022-10-04 DIAGNOSIS — I10 ESSENTIAL HYPERTENSION: ICD-10-CM

## 2022-10-04 DIAGNOSIS — Z74.09 IMPAIRED FUNCTIONAL MOBILITY, BALANCE, GAIT, AND ENDURANCE: ICD-10-CM

## 2022-10-04 DIAGNOSIS — M17.11 PRIMARY OSTEOARTHRITIS OF RIGHT KNEE: Primary | ICD-10-CM

## 2022-10-04 DIAGNOSIS — Z96.641 PRESENCE OF RIGHT HIP IMPLANT: ICD-10-CM

## 2022-10-04 DIAGNOSIS — Z96.651 S/P TKR (TOTAL KNEE REPLACEMENT), RIGHT: Primary | ICD-10-CM

## 2022-10-04 DIAGNOSIS — G47.33 OSA (OBSTRUCTIVE SLEEP APNEA): ICD-10-CM

## 2022-10-04 DIAGNOSIS — M25.561 CHRONIC PAIN OF RIGHT KNEE: ICD-10-CM

## 2022-10-04 DIAGNOSIS — G89.29 CHRONIC PAIN OF RIGHT KNEE: ICD-10-CM

## 2022-10-04 DIAGNOSIS — M17.11 PRIMARY OSTEOARTHRITIS OF RIGHT KNEE: ICD-10-CM

## 2022-10-04 DIAGNOSIS — K21.9 GASTROESOPHAGEAL REFLUX DISEASE WITHOUT ESOPHAGITIS: ICD-10-CM

## 2022-10-04 PROCEDURE — 99024 POSTOP FOLLOW-UP VISIT: CPT | Performed by: NURSE PRACTITIONER

## 2022-10-04 PROCEDURE — 97110 THERAPEUTIC EXERCISES: CPT

## 2022-10-04 RX ORDER — OXYCODONE AND ACETAMINOPHEN 7.5; 325 MG/1; MG/1
1 TABLET ORAL EVERY 8 HOURS PRN
Qty: 30 TABLET | Refills: 0 | Status: SHIPPED | OUTPATIENT
Start: 2022-10-04 | End: 2022-10-24 | Stop reason: SDUPTHER

## 2022-10-04 NOTE — PROGRESS NOTES
Sam Infante is a 67 y.o. male is s/p  Right Total Knee Arthroplasty      Chief Complaint   Patient presents with   • Right Knee - Pain, Follow-up     Postop- Right TKA     HISTORY OF PRESENT ILLNESS: Patient presents to office for postoperative follow-up status post right total knee arthroplasty performed per Dr. Kate on 9/19/2022.  Patient is doing well postoperatively and progressing as expected.  Patient reports that his postoperative right knee pain and swelling have been gradually improving.  Patient has been able to progress his weightbearing and is not currently using any assistive device in office today.  Patient has continue with his current course of physical therapy and has attended 4/4 visits.  Patient has continued to take Eliquis as directed for DVT prophylaxis.  Patient has continued to take Percocet 7.5 mg as needed for pain control and requests a refill of this today.  Staples are removed today in office x-rays are performed in office today current pain scale is 3/10.    Allergies   Allergen Reactions   • Statins Other (See Comments)     Muscle pain         Current Outpatient Medications:   •  ferrous sulfate 324 (65 Fe) MG tablet delayed-release EC tablet, Take 1 tablet by mouth Daily With Breakfast., Disp: 30 tablet, Rfl: 0  •  latanoprost (XALATAN) 0.005 % ophthalmic solution, Administer 1 drop to both eyes Every Night., Disp: 2.5 mL, Rfl: 12  •  metoprolol tartrate (LOPRESSOR) 50 MG tablet, Take 50 mg by mouth 2 (Two) Times a Day., Disp: , Rfl:   •  Multiple Vitamins-Minerals (PRESERVISION AREDS PO), Take 1 tablet by mouth 2 (Two) Times a Day., Disp: , Rfl:   •  naproxen (EC NAPROSYN) 500 MG EC tablet, TAKE 1 TABLET TWICE DAILY WITH MEALS, Disp: 60 tablet, Rfl: 10  •  sennosides-docusate (PERICOLACE) 8.6-50 MG per tablet, Take 2 tablets by mouth Every Night for 20 doses., Disp: 40 tablet, Rfl: 0  •  verapamil PM (VERELAN PM) 360 MG 24 hr capsule, Take 360 mg by mouth daily., Disp: ,  Rfl:   •  oxyCODONE-acetaminophen (PERCOCET) 7.5-325 MG per tablet, Take 1 tablet by mouth Every 8 (Eight) Hours As Needed for Severe Pain., Disp: 30 tablet, Rfl: 0    No fevers or chills.  No nausea or vomiting.  Right knee pain      PHYSICAL EXAMINATION:       Sam Infante is a 67 y.o. male    Patient is awake and alert, answers questions appropriately and is in no apparent distress.    GAIT:     []  Normal  [x]  Antalgic (mild)    Assistive device: [x]  None  []  Walker     []  Crutches  []  Cane     []  Wheelchair  []  Stretcher    Right Knee Exam     Tenderness   Right knee tenderness location: Mild, diffuse.    Range of Motion   Extension: 0   Flexion: 90     Other   Erythema: absent  Sensation: normal  Pulse: present  Swelling: moderate    Comments:  Overall good motion of the knee joint, progressing as expected post total knee arthroplasty.  Surgical incision is well approximated with no erythema no drainage noted.  No signs of infection noted.  Moderate, generalized swelling but overall swelling is fairly well controlled.  Calf is soft and nontender.  Homans' sign is negative.            XR Knee 1 or 2 View Right    Result Date: 10/6/2022  Narrative: PROCEDURE: Bilateral knee x-rays with two views. INDICATION: pain, Z96.651 Presence of right artificial knee joint COMPARISON: 9/19/2022 right knee x-ray. FINDINGS: Standing AP views of both knees and standing lateral view of the right knee. Findings consistent with recent right total knee arthroplasty present on the previous study. Components remain normally aligned. Joint effusion. Anterior cutaneous surgical staples remain in place. Standing AP view of the left knee demonstrates marked medial joint space narrowing with associated osteophytes. Small lateral osteophytes.     Impression: Status post total knee replacement with normal alignment of components. Joint effusion. Anterior cutaneous staples. Advanced medial and mild lateral degenerative  arthritic change left knee. [id number] Electronically signed by:  Devonte Qiu MD  10/6/2022 11:03 AM CDT Workstation: [id number]    XR Knee 1 or 2 View Right    Result Date: 9/21/2022  Narrative: PROCEDURE: XR KNEE 1 OR 2 VW RIGHT VIEWS: 2 INDICATION: Postoperative evaluation COMPARISON: 8/23/2022 FINDINGS:   -New tricomponent total knee prosthesis. Prosthetic elements appear to be in standard position. No evidence of immediate complication. Expected postoperative changes including locules of gas in soft tissues and skin staples are present.     Impression: Expected postoperative appearance, status post total knee arthroplasty Electronically signed by:  Debbie Spence MD  9/21/2022 11:04 AM CDT Workstation: [id number]    XR Knee Bilateral AP Standing    Result Date: 10/6/2022  Narrative: PROCEDURE: Bilateral knee x-rays with two views. INDICATION: pain, Z96.651 Presence of right artificial knee joint COMPARISON: 9/19/2022 right knee x-ray. FINDINGS: Standing AP views of both knees and standing lateral view of the right knee. Findings consistent with recent right total knee arthroplasty present on the previous study. Components remain normally aligned. Joint effusion. Anterior cutaneous surgical staples remain in place. Standing AP view of the left knee demonstrates marked medial joint space narrowing with associated osteophytes. Small lateral osteophytes.     Impression: Status post total knee replacement with normal alignment of components. Joint effusion. Anterior cutaneous staples. Advanced medial and mild lateral degenerative arthritic change left knee. [id number] Electronically signed by:  Devonte Qiu MD  10/6/2022 11:03 AM CDT Workstation: [id number]    Peripheral Block    Result Date: 9/19/2022  Narrative: Lalita Ramos DO     9/19/2022  9:04 AM Peripheral Block Patient reassessed immediately prior to procedure Patient location during procedure: pre-op Stop time: 9/19/2022 8:55 AM Reason for block: at  surgeon's request, post-op pain management and secondary anesthetic Performed by Anesthesiologist: Lalita Ramos DO Preanesthetic Checklist Completed: patient identified, IV checked, site marked, risks and benefits discussed, surgical consent, monitors and equipment checked, pre-op evaluation and timeout performed Prep: Pt Position: supine Sterile barriers:cap, gloves and sterile barriers Prep: ChloraPrep Patient monitoring: blood pressure monitoring, continuous pulse oximetry and EKG Procedure Sedation: no Performed under: PNB Guidance:ultrasound guided ULTRASOUND INTERPRETATION.  Using ultrasound guidance a 21 G gauge needle was placed in close proximity to the femoral nerve, at which point, under ultrasound guidance anesthetic was injected in the area of the nerve and spread of the anesthesia was seen on ultrasound in close proximity thereto.  There were no abnormalities seen on ultrasound; a digital image was taken; and the patient tolerated the procedure with no complications. Images:still images obtained, printed/placed on chart Laterality:right Block Type:adductor canal block Injection Technique:single-shot Needle Type:echogenic Needle Gauge:20 G Resistance on Injection: none Catheter Size:20 G Cath Depth at skin: 5 cm Medications Used: lidocaine PF 1% (XYLOCAINE) injection, 30 mg ropivacaine (NAROPIN) 0.5 % injection, 30 mL Post Assessment Injection Assessment: negative aspiration for heme, no paresthesia on injection and incremental injection Patient Tolerance:comfortable throughout block Complications:no Additional Notes Pt & side identified U/S used throughout and needle seen throughout No complications Pt tolerated procedure well         ASSESSMENT:    Diagnoses and all orders for this visit:    S/P TKR (total knee replacement), right    Chronic pain of right knee    PLAN    AP standing x-ray of the bilateral knees with a lateral x-ray of the right knee performed in office today and reviewed.   "The right knee implant appears stable and unchanged.  The patient is doing well postoperatively and progressively improving.  He has no unusual complaints or concerns noted.  Surgical incision is healing as expected with no signs of infection noted.  Staples are removed today in office and Steri-Strips placed.  Wound care instructions reviewed, including care of Steri-Strips.  Patient is instructed to continue to monitor the knee/incision for any signs of infection including redness, increased swelling, increased tenderness, increased warmth and/or purulent drainage.  Patient is instructed to notify the office immediately if any such signs of infection are noted.    Recommend to continue with his current course of physical therapy and home exercises as instructed.  Recommend to continue with gradual progression of weightbearing and activity as pain and swelling allow.  We discussed the importance of not \"overdoing it\" and modifying his activity if he experiences any increased pain or swelling.  Patient is not currently using any assistive device in office today and appears to be doing well.  Recommend to continue/finish the 2-week course of Eliquis.  Patient is instructed to start taking a baby aspirin twice daily for the next 4 weeks for continued DVT prophylaxis.  Patient has continued to take Percocet 7.5 mg as needed for pain control and he requests a refill of this today.  Percocet is refilled for the patient per Dr. Kate.  SUZIE is reviewed internally via Epic and is noted to be appropriate.  Recommend Tylenol, Aleve or Ibuprofen as needed for milder pain.    Follow-up in 4 weeks for recheck    EMR Dragon/Transciption Disclaimer: Some of this note may be an electronic transcription/translation of spoken language to printed text using the Dragon Dictation System.     Return in about 4 weeks (around 11/1/2022) for Recheck.        This document has been electronically signed by JOANIE Ojeda on October 6, " 2022 11:36 CDT      Azul Andrea APRN

## 2022-10-04 NOTE — THERAPY TREATMENT NOTE
Outpatient Physical Therapy Ortho Treatment Note  [facility]     Patient Name: Sam Infante  : 1955  MRN: [id number]  Today's Date: 10/4/2022      Visit Date: 10/04/2022     Subjective Improvement 60-70%  Visits 5/5  Visits approved med nec  RTMD ?  Recert Date 2022    S/P R TKA on 2022    Visit Dx:    ICD-10-CM ICD-9-CM   1. Primary osteoarthritis of right knee  M17.11 715.16   2. Impaired functional mobility, balance, gait, and endurance  Z74.09 V49.89   3. Presence of right hip implant  Z96.641 V43.64       Patient Active Problem List   Diagnosis   • Borderline glaucoma, open angle with borderline findings   • Borderline glaucoma   • Left hand pain   • Bilateral carpal tunnel syndrome   • Right hip pain   • Primary osteoarthritis of right hip   • Essential hypertension   • Attention deficit hyperactivity disorder   • Benign prostatic hyperplasia   • Fatigue   • Gastroesophageal reflux disease without esophagitis   • Hyperlipidemia   • Mixed anxiety and depressive disorder   • BERNADETTE (obstructive sleep apnea)   • Routine physical examination   • Hx of total hip arthroplasty, right   • Right shoulder pain   • Rotator cuff impingement syndrome of right shoulder   • Impingement syndrome of right shoulder   • Presence of right hip implant   • Primary osteoarthritis of both knees   • Primary osteoarthritis of right knee   • Chronic pain of right knee   • S/P TKR (total knee replacement), right        Past Medical History:   Diagnosis Date   • Arthritis    • Elevated cholesterol    • GERD (gastroesophageal reflux disease)     OCCASIONAL   • High blood pressure    • Kidney stone    • Sleep apnea     using c-pap   • Vitreous floater 2013        Past Surgical History:   Procedure Laterality Date   • COLONOSCOPY     • SEPTOPLASTY  2013    Nasal surgery procedure (Nasal septoplasty.)   • TOTAL HIP ARTHROPLASTY Right 10/14/2019    Procedure: TOTAL HIP ARTHROPLASTY ANTERIOR;   "Surgeon: Nathaniel Kate MD;  Location: [facility];  Service: Orthopedics   • TOTAL KNEE ARTHROPLASTY Right 9/19/2022    Procedure: RIGHT TOTAL KNEE ARTHROPLASTY WITH ADDUCTOR CANAL BLOCK;  Surgeon: Nathaniel Kate MD;  Location: [facility];  Service: Orthopedics;  Laterality: Right;        PT Ortho     Row Name 10/04/22 1300       Subjective Comments    Subjective Comments Patient states that he is doing well  -CP       Precautions and Contraindications    Precautions R TKA 9-  -CP    Contraindications 2 weeks 2 days post op  -CP       Subjective Pain    Able to rate subjective pain? yes  -CP    Pre-Treatment Pain Level 3  -CP    Post-Treatment Pain Level 3  -CP       Posture/Observations    Posture/Observations Comments independent amb  -CP       Right Lower Ext    Rt Knee Extension/Flexion AROM 0-98  -CP    RT Lower Extremity Comments 100 fl in supine  -CP          User Key  (r) = Recorded By, (t) = Taken By, (c) = Cosigned By    Initials Name Provider Type    Adwoa Kennedy PTA Physical Therapist Assistant                             PT Assessment/Plan     Row Name 10/04/22 1435          PT Assessment    Assessment Comments Very good tolerance to ther ex.  He is progressing very well.  No lag with LAQ noted this date and increase AROM for knee fl  -CP            PT Plan    PT Frequency 3x/week  -CP     Predicted Duration of Therapy Intervention (PT) 6-8 weeks  -CP     PT Plan Comments Cont with POC.  eccentric quad strengthening, TKE and step down 4\"  -CP           User Key  (r) = Recorded By, (t) = Taken By, (c) = Cosigned By    Initials Name Provider Type    Adwoa Kennedy PTA Physical Therapist Assistant                 Modalities     Row Name 10/04/22 1400             Ice    Patient reports will apply ice at home to involved area Yes  -CP            User Key  (r) = Recorded By, (t) = Taken By, (c) = Cosigned By    Initials Name Provider Type    Adwoa Kennedy PTA " "Physical Therapist Assistant               OP Exercises     Row Name 10/04/22 1437 10/04/22 1300          Subjective Comments    Subjective Comments -- Patient states that he is doing well  -CP            Subjective Pain    Able to rate subjective pain? -- yes  -CP     Pre-Treatment Pain Level -- 3  -CP     Post-Treatment Pain Level -- 3  -CP            Total Minutes    27727 - PT Therapeutic Exercise Minutes 45  -CP --            Exercise 1    Exercise Name 1 -- Pro Ii level 2  -CP     Time 1 -- 10  -CP            Exercise 2    Exercise Name 2 -- incline stretch  -CP     Cueing 2 -- Verbal  -CP     Sets 2 -- 3  -CP     Time 2 -- 30\"  -CP            Exercise 3    Exercise Name 3 -- step up 6\"  -CP     Cueing 3 -- Verbal;Demo  -CP     Sets 3 -- 2  -CP     Reps 3 -- 10  -CP            Exercise 4    Exercise Name 4 -- lat step up 4\"  -CP     Cueing 4 -- Verbal;Demo  -CP     Sets 4 -- 2  -CP     Reps 4 -- 10  -CP            Exercise 5    Exercise Name 5 -- mini squats  -CP     Cueing 5 -- Verbal;Demo  -CP     Sets 5 -- 2  -CP     Reps 5 -- 10  -CP            Exercise 6    Exercise Name 6 -- standing hip AB  -CP     Cueing 6 -- Verbal;Demo  -CP     Sets 6 -- 2  -CP     Reps 6 -- 10  -CP     Time 6 -- B LE  -CP            Exercise 7    Exercise Name 7 -- seated LLPS knee fl stretch  -CP     Cueing 7 -- Verbal;Demo  -CP     Sets 7 -- 5  -CP     Time 7 -- 30\" holds  -CP            Exercise 8    Exercise Name 8 -- heelslides with strap  -CP     Cueing 8 -- Verbal;Tactile  -CP     Reps 8 -- 30  -CP            Exercise 9    Exercise Name 9 -- QS with over pressure  -CP     Cueing 9 -- Verbal;Tactile  -CP     Sets 9 -- 2  -CP     Reps 9 -- 10  -CP     Time 9 -- 5 \" holds  -CP            Exercise 10    Exercise Name 10 -- LAQ  -CP     Cueing 10 -- Verbal;Demo  -CP     Sets 10 -- 2  -CP     Reps 10 -- 10  -CP     Time 10 -- 5\" holds  -CP           User Key  (r) = Recorded By, (t) = Taken By, (c) = Cosigned By    Initials Name " Provider Type    Adwoa Kennedy PTA Physical Therapist Assistant                              PT OP Goals     Row Name 10/04/22 1400          PT Short Term Goals    STG Date to Achieve 10/12/22  -CP     STG 1 Note a >/= 50% subjective improvement.  -CP     STG 1 Progress Met  -CP     STG 2 LEFS score to be >/= 40/80.  -CP     STG 2 Progress Progressing  -CP     STG 3 R knee active extension to 0 deg.  -CP     STG 3 Progress Met  -CP     STG 4 R knee active flexion to be >/= 100 deg.  -CP     STG 5 Demonstrate ability to ambulate household distances or greater with cane.  -CP     STG 5 Progress Met  -CP            Long Term Goals    LTG Date to Achieve 11/16/22  -CP     LTG 1 Independent with HEP/self-management.  -CP     LTG 2 LEFS score to be >/= 60/80.  -CP     LTG 3 R knee active flexion to be >/= 120 deg.  -CP     LTG 4 Demonstrate ability to ambulate community distances on firm, level surfaces without assistive device.  -CP     LTG 5 Demonstrate ability to ascend 1 flight of stairs step-over-step with HR use PRN.  -CP            Time Calculation    PT Goal Re-Cert Due Date 10/12/22  -CP           User Key  (r) = Recorded By, (t) = Taken By, (c) = Cosigned By    Initials Name Provider Type    CP Adwoa May PTA Physical Therapist Assistant                Therapy Education  Education Details: seated LLPS knee knee stretch, LAQ, standing hip AB  Given: HEP  Program: New  How Provided: Verbal, Demonstration, Written  Provided to: Patient  Level of Understanding: Teach back education performed, Verbalized, Demonstrated    Outcome Measure Options: Lower Extremity Functional Scale (LEFS)  Lower Extremity Functional Index  Any of your usual work, housework or school activities: Moderate difficulty  Your usual hobbies, recreational or sporting activities: Moderate difficulty  Getting into or out of the bath: A little bit of difficulty  Walking between rooms: A little bit of difficulty  Putting on your  shoes or socks: A little bit of difficulty  Squatting: Quite a bit of difficulty  Lifting an object, like a bag of groceries from the floor: Moderate difficulty  Performing light activities around your home: A little bit of difficulty  Performing heavy activities around your home: Quite a bit of difficulty  Getting into or out of a car: Moderate difficulty  Walking 2 blocks: Quite a bit of difficulty  Walking a mile: Quite a bit of difficulty  Going up or down 10 stairs (about 1 flight of stairs): Moderate difficulty  Standing for 1 hour: Extreme difficulty or unable to perform activity  Sitting for 1 hour: Moderate difficulty  Running on even ground: Extreme difficulty or unable to perform activity  Running on uneven ground: Extreme difficulty or unable to perform activity  Making sharp turns while running fast: Extreme difficulty or unable to perform activity  Hopping: Quite a bit of difficulty  Rolling over in bed: Quite a bit of difficulty  Total: 30      Time Calculation:   Start Time: 1345  Stop Time: 1430  Time Calculation (min): 45 min  Total Timed Code Minutes- PT: 45 minute(s)  Timed Charges  11083 - PT Therapeutic Exercise Minutes: 45  Total Minutes  Timed Charges Total Minutes: 45   Total Minutes: 45  Therapy Charges for Today     Code Description Service Date Service Provider Modifiers Qty    38344494145 HC PT THER PROC EA 15 MIN 10/4/2022 Adwoa May PTA GP, CQ 3          PT G-Codes  Outcome Measure Options: Lower Extremity Functional Scale (LEFS)  Total: 30         Adwoa May PTA  10/4/2022

## 2022-10-05 ENCOUNTER — HOSPITAL ENCOUNTER (OUTPATIENT)
Dept: PHYSICAL THERAPY | Facility: HOSPITAL | Age: 67
Setting detail: THERAPIES SERIES
Discharge: HOME | End: 2022-10-05

## 2022-10-05 DIAGNOSIS — M17.11 PRIMARY OSTEOARTHRITIS OF RIGHT KNEE: Primary | ICD-10-CM

## 2022-10-05 PROCEDURE — 97530 THERAPEUTIC ACTIVITIES: CPT

## 2022-10-05 PROCEDURE — 97110 THERAPEUTIC EXERCISES: CPT

## 2022-10-05 PROCEDURE — G0283 ELEC STIM OTHER THAN WOUND: HCPCS

## 2022-10-05 NOTE — THERAPY TREATMENT NOTE
Outpatient Physical Therapy Ortho Treatment Note  [facility]     Patient Name: Sam Infante  : 1955  MRN: [id number]  Today's Date: 10/5/2022      Visit Date: 10/05/2022     Subjective Improvement 70%  Visits 6/6  Visits approved med nec  RTMD 2022  Recert Date ?    S/P R TKA on 2022    Visit Dx:    ICD-10-CM ICD-9-CM   1. Primary osteoarthritis of right knee  M17.11 715.16       Patient Active Problem List   Diagnosis   • Borderline glaucoma, open angle with borderline findings   • Borderline glaucoma   • Left hand pain   • Bilateral carpal tunnel syndrome   • Right hip pain   • Primary osteoarthritis of right hip   • Essential hypertension   • Attention deficit hyperactivity disorder   • Benign prostatic hyperplasia   • Fatigue   • Gastroesophageal reflux disease without esophagitis   • Hyperlipidemia   • Mixed anxiety and depressive disorder   • BERNADETTE (obstructive sleep apnea)   • Routine physical examination   • Hx of total hip arthroplasty, right   • Right shoulder pain   • Rotator cuff impingement syndrome of right shoulder   • Impingement syndrome of right shoulder   • Presence of right hip implant   • Primary osteoarthritis of both knees   • Primary osteoarthritis of right knee   • Chronic pain of right knee   • S/P TKR (total knee replacement), right        Past Medical History:   Diagnosis Date   • Arthritis    • Elevated cholesterol    • GERD (gastroesophageal reflux disease)     OCCASIONAL   • High blood pressure    • Kidney stone    • Sleep apnea     using c-pap   • Vitreous floater 2013        Past Surgical History:   Procedure Laterality Date   • COLONOSCOPY     • SEPTOPLASTY  2013    Nasal surgery procedure (Nasal septoplasty.)   • TOTAL HIP ARTHROPLASTY Right 10/14/2019    Procedure: TOTAL HIP ARTHROPLASTY ANTERIOR;  Surgeon: Nathaniel Kate MD;  Location: [facility];  Service: Orthopedics   • TOTAL KNEE ARTHROPLASTY Right 2022    Procedure:  RIGHT TOTAL KNEE ARTHROPLASTY WITH ADDUCTOR CANAL BLOCK;  Surgeon: Nathaniel Kate MD;  Location: [facility];  Service: Orthopedics;  Laterality: Right;        PT Ortho     Row Name 10/05/22 1100       Subjective Comments    Subjective Comments Patient states that he is having more pain today for some reason  -CP       Precautions and Contraindications    Precautions R TKA 9-  -CP    Contraindications 2 weeks 2 days  -CP       Subjective Pain    Able to rate subjective pain? yes  -CP    Pre-Treatment Pain Level 5  -CP       Right Lower Ext    Rt Knee Extension/Flexion AROM 0-96  -CP    RT Lower Extremity Comments 101 in supine  -CP          User Key  (r) = Recorded By, (t) = Taken By, (c) = Cosigned By    Initials Name Provider Type    Adwoa Kennedy, PTA Physical Therapist Assistant                             PT Assessment/Plan     Row Name 10/05/22 1147          PT Assessment    Assessment Comments Patient did have increase pain this date.   and slight decrease in AROM for knee fl. He did tolerated ther ex well.  -CP            PT Plan    PT Frequency 3x/week  -CP     Predicted Duration of Therapy Intervention (PT) 6-8 weeks  -CP     PT Plan Comments Cont with POC.  ROM and strengthening as tolerated  -CP           User Key  (r) = Recorded By, (t) = Taken By, (c) = Cosigned By    Initials Name Provider Type    Adwoa Kennedy, PTA Physical Therapist Assistant                 Modalities     Row Name 10/05/22 1100             Ice    Ice Applied Yes  -CP      Location right knee with ifc  -CP      PT Ice Rx Minutes 12  -CP      Ice S/P Rx Yes  -CP              ELECTRICAL STIMULATION    Attended/Unattended Unattended  -CP      Stimulation Type IFC  -CP      Location/Electrode Placement/Other right knee with ice  -CP      PT E-Stim Unattended Minutes 12  -CP            User Key  (r) = Recorded By, (t) = Taken By, (c) = Cosigned By    Initials Name Provider Type    Adwoa Kennedy, PTA  "Physical Therapist Assistant               OP Exercises     Row Name 10/05/22 1150 10/05/22 1100          Subjective Comments    Subjective Comments -- Patient states that he is having more pain today for some reason  -CP            Subjective Pain    Able to rate subjective pain? -- yes  -CP     Pre-Treatment Pain Level -- 5  -CP            Total Minutes    22712 - PT Therapeutic Exercise Minutes 34  -CP --     62391 - PT Therapeutic Activity Minutes 8  -CP --            Exercise 1    Exercise Name 1 -- Pro Ii level 2  -CP     Time 1 -- 10  -CP     Additional Comments -- seat 9  -CP            Exercise 2    Exercise Name 2 -- incline stretch  -CP     Cueing 2 -- Verbal  -CP     Sets 2 -- 3  -CP     Time 2 -- 30  -CP            Exercise 3    Exercise Name 3 -- step up 6\"  -CP     Cueing 3 -- Verbal;Demo  -CP     Sets 3 -- 2  -CP     Reps 3 -- 10  -CP            Exercise 4    Exercise Name 4 -- lat step up 4\"  -CP     Cueing 4 -- Verbal;Demo  -CP     Sets 4 -- 2  -CP     Reps 4 -- 10  -CP            Exercise 5    Exercise Name 5 -- TKE with tband  -CP     Cueing 5 -- Verbal;Demo  -CP     Sets 5 -- 2  -CP     Reps 5 -- 10  -CP     Time 5 -- green  -CP            Exercise 6    Exercise Name 6 -- standing hip AB  -CP     Cueing 6 -- Verbal;Demo  -CP     Sets 6 -- 2  -CP     Reps 6 -- 10  -CP     Time 6 -- B LE  -CP            Exercise 7    Exercise Name 7 -- seated knee fl with tband  -CP     Cueing 7 -- Verbal;Tactile  -CP     Sets 7 -- 2  -CP     Reps 7 -- 10  -CP     Time 7 -- green  -CP            Exercise 8    Exercise Name 8 -- seated LAQ isometric  -CP     Cueing 8 -- Verbal;Tactile  -CP     Sets 8 -- 1  -CP     Reps 8 -- 10  -CP     Time 8 -- 5\" holds  -CP            Exercise 9    Exercise Name 9 -- heelslides with strap  -CP     Cueing 9 -- Verbal;Tactile  -CP     Reps 9 -- 20  -CP            Exercise 10    Exercise Name 10 -- qs with heelprop  -CP     Cueing 10 -- Verbal;Tactile  -CP     Sets 10 -- 1  -CP     " "Reps 10 -- 15  -CP     Time 10 -- 5\" holds  -CP           User Key  (r) = Recorded By, (t) = Taken By, (c) = Cosigned By    Initials Name Provider Type    CP Adwoa May PTA Physical Therapist Assistant                              PT OP Goals     Row Name 10/05/22 1100          PT Short Term Goals    STG Date to Achieve 10/12/22  -CP     STG 1 Note a >/= 50% subjective improvement.  -CP     STG 1 Progress Met  -CP     STG 2 LEFS score to be >/= 40/80.  -CP     STG 2 Progress Progressing  -CP     STG 3 R knee active extension to 0 deg.  -CP     STG 3 Progress Met  -CP     STG 4 R knee active flexion to be >/= 100 deg.  -CP     STG 4 Progress Met  -CP     STG 4 Progress Comments in supine  -CP     STG 5 Demonstrate ability to ambulate household distances or greater with cane.  -CP     STG 5 Progress Met  -CP            Long Term Goals    LTG Date to Achieve 11/16/22  -CP     LTG 1 Independent with HEP/self-management.  -CP     LTG 2 LEFS score to be >/= 60/80.  -CP     LTG 3 R knee active flexion to be >/= 120 deg.  -CP     LTG 4 Demonstrate ability to ambulate community distances on firm, level surfaces without assistive device.  -CP     LTG 5 Demonstrate ability to ascend 1 flight of stairs step-over-step with HR use PRN.  -CP            Time Calculation    PT Goal Re-Cert Due Date 10/12/22  -CP           User Key  (r) = Recorded By, (t) = Taken By, (c) = Cosigned By    Initials Name Provider Type    Adwoa Kennedy PTA Physical Therapist Assistant                               Time Calculation:   Start Time: 1055  Stop Time: 1150  Time Calculation (min): 55 min  Total Timed Code Minutes- PT: 42 minute(s)  Timed Charges  23772 - PT Therapeutic Exercise Minutes: 34  28604 - PT Therapeutic Activity Minutes: 8  Untimed Charges  PT E-Stim Unattended Minutes: 12  PT Ice Rx Minutes: 12  Total Minutes  Timed Charges Total Minutes: 42  Untimed Charges Total Minutes: 24   Total Minutes: 42  Therapy Charges for " Today     Code Description Service Date Service Provider Modifiers Qty    46461380195 HC PT THER PROC EA 15 MIN 10/5/2022 Adwoa May, JANESSA GP, CQ 2    44936803630 HC PT THERAPEUTIC ACT EA 15 MIN 10/5/2022 Adwoa May PTA GP, CQ 1    13861365927 HC PT ELECTRICAL STIM UNATTENDED 10/5/2022 Adwoa Mya, PTA CQ 1                    Adwoa May PTA  10/5/2022

## 2022-10-07 ENCOUNTER — HOSPITAL ENCOUNTER (OUTPATIENT)
Dept: PHYSICAL THERAPY | Facility: HOSPITAL | Age: 67
Setting detail: THERAPIES SERIES
Discharge: HOME | End: 2022-10-07

## 2022-10-07 DIAGNOSIS — M17.11 PRIMARY OSTEOARTHRITIS OF RIGHT KNEE: Primary | ICD-10-CM

## 2022-10-07 DIAGNOSIS — Z74.09 IMPAIRED FUNCTIONAL MOBILITY, BALANCE, GAIT, AND ENDURANCE: ICD-10-CM

## 2022-10-07 PROCEDURE — 97110 THERAPEUTIC EXERCISES: CPT

## 2022-10-07 PROCEDURE — G0283 ELEC STIM OTHER THAN WOUND: HCPCS

## 2022-10-07 NOTE — THERAPY TREATMENT NOTE
Outpatient Physical Therapy Ortho Treatment Note  [facility]     Patient Name: Sam Infante  : 1955  MRN: [id number]  Today's Date: 10/7/2022      Visit Date: 10/07/2022     Subjective Improvement 70%  Visits   Visits approved med [facility]  RTMD 2022  Recert Date 10-    S/P R TKA on 2022      Visit Dx:    ICD-10-CM ICD-9-CM   1. Primary osteoarthritis of right knee  M17.11 715.16   2. Impaired functional mobility, balance, gait, and endurance  Z74.09 V49.89       Patient Active Problem List   Diagnosis   • Borderline glaucoma, open angle with borderline findings   • Borderline glaucoma   • Left hand pain   • Bilateral carpal tunnel syndrome   • Right hip pain   • Primary osteoarthritis of right hip   • Essential hypertension   • Attention deficit hyperactivity disorder   • Benign prostatic hyperplasia   • Fatigue   • Gastroesophageal reflux disease without esophagitis   • Hyperlipidemia   • Mixed anxiety and depressive disorder   • BERNADETTE (obstructive sleep apnea)   • Routine physical examination   • Hx of total hip arthroplasty, right   • Right shoulder pain   • Rotator cuff impingement syndrome of right shoulder   • Impingement syndrome of right shoulder   • Presence of right hip implant   • Primary osteoarthritis of both knees   • Primary osteoarthritis of right knee   • Chronic pain of right knee   • S/P TKR (total knee replacement), right        Past Medical History:   Diagnosis Date   • Arthritis    • Elevated cholesterol    • GERD (gastroesophageal reflux disease)     OCCASIONAL   • High blood pressure    • Kidney stone    • Sleep apnea     using c-pap   • Vitreous floater 2013        Past Surgical History:   Procedure Laterality Date   • COLONOSCOPY     • SEPTOPLASTY  2013    Nasal surgery procedure (Nasal septoplasty.)   • TOTAL HIP ARTHROPLASTY Right 10/14/2019    Procedure: TOTAL HIP ARTHROPLASTY ANTERIOR;  Surgeon: Nathaniel Kate MD;  Location:   [facility] OR;  Service: Orthopedics   • TOTAL KNEE ARTHROPLASTY Right 9/19/2022    Procedure: RIGHT TOTAL KNEE ARTHROPLASTY WITH ADDUCTOR CANAL BLOCK;  Surgeon: Nathaniel Kate MD;  Location: [facility] OR;  Service: Orthopedics;  Laterality: Right;        PT Ortho     Row Name 10/07/22 0900       Subjective Comments    Subjective Comments Patient cont to report improvement  -CP       Precautions and Contraindications    Precautions R TKA 9-  -CP    Contraindications post op 2wks 4 days  -CP       Subjective Pain    Able to rate subjective pain? yes  -CP       Right Lower Ext    Rt Knee Extension/Flexion AROM 0-103  -CP          User Key  (r) = Recorded By, (t) = Taken By, (c) = Cosigned By    Initials Name Provider Type    Adwoa Kennedy, PTA Physical Therapist Assistant                             PT Assessment/Plan     Row Name 10/07/22 1029          PT Assessment    Assessment Comments Very good tolerance to ther ex.  He did demo increase quad contol and increase AROM  -CP            PT Plan    PT Frequency 3x/week  -CP     Predicted Duration of Therapy Intervention (PT) 6-8 weeks  -CP     PT Plan Comments cont with POC.  -CP           User Key  (r) = Recorded By, (t) = Taken By, (c) = Cosigned By    Initials Name Provider Type    Adwoa Kennedy, PTA Physical Therapist Assistant                 Modalities     Row Name 10/07/22 0900             Ice    Ice Applied Yes  -CP      Location right knee with ifc  -CP      PT Ice Rx Minutes 15  -CP      Ice S/P Rx Yes  -CP              ELECTRICAL STIMULATION    Attended/Unattended Unattended  -CP      Stimulation Type IFC  -CP      Location/Electrode Placement/Other right knee with ice  -CP      PT E-Stim Unattended Minutes 15  -CP            User Key  (r) = Recorded By, (t) = Taken By, (c) = Cosigned By    Initials Name Provider Type    Adwoa Kennedy, JANESSA Physical Therapist Assistant               OP Exercises     Row Name 10/07/22 1051 10/07/22  "0900          Subjective Comments    Subjective Comments -- Patient cont to report improvement  -CP            Subjective Pain    Able to rate subjective pain? -- yes  -CP     Pre-Treatment Pain Level -- 5  -CP     Post-Treatment Pain Level -- 2  -CP            Total Minutes    83824 - PT Therapeutic Exercise Minutes 48  -CP --            Exercise 1    Exercise Name 1 -- Pro II level 2  -CP     Time 1 -- 10  -CP            Exercise 2    Exercise Name 2 -- incline sretch  -CP     Cueing 2 -- Verbal;Demo  -CP     Sets 2 -- 3  -CP     Time 2 -- 30\"  -CP            Exercise 3    Exercise Name 3 -- standing lunge stretch  -CP     Cueing 3 -- Verbal;Demo  -CP     Sets 3 -- 3  -CP     Time 3 -- 30\" holds  -CP            Exercise 4    Exercise Name 4 -- step up 6\"  -CP     Cueing 4 -- Verbal;Demo  -CP     Sets 4 -- 2  -CP     Reps 4 -- 10  -CP            Exercise 5    Exercise Name 5 -- lat step up 4\"  -CP     Cueing 5 -- Verbal;Demo  -CP     Sets 5 -- 2  -CP     Reps 5 -- 10  -CP            Exercise 6    Exercise Name 6 -- ramp walks fprward  -CP     Cueing 6 -- Verbal;Demo  -CP     Reps 6 -- 5  -CP            Exercise 7    Exercise Name 7 -- seated LLPS knee fl  -CP     Cueing 7 -- Verbal;Demo  -CP     Sets 7 -- 4  -CP     Time 7 -- 30\"  -CP            Exercise 8    Exercise Name 8 -- heelsldies with strap  -CP     Cueing 8 -- Verbal;Tactile  -CP     Reps 8 -- 20  -CP            Exercise 9    Exercise Name 9 -- supine manual LLPS knee fl stretch  -CP     Sets 9 -- 2  -CP     Time 9 -- 30\"  -CP            Exercise 10    Exercise Name 10 -- QS with heel prop  -CP     Cueing 10 -- Verbal  -CP     Sets 10 -- 1  -CP     Reps 10 -- 10  -CP     Time 10 -- 10\" holds  -CP            Exercise 11    Exercise Name 11 -- manual QS with over pressure  -CP     Cueing 11 -- Verbal;Tactile  -CP     Sets 11 -- 1  -CP     Reps 11 -- 10  -CP     Time 11 -- 10\" holds  -CP           User Key  (r) = Recorded By, (t) = Taken By, (c) = Cosigned " By    Initials Name Provider Type    Adwoa Kennedy PTA Physical Therapist Assistant                              PT OP Goals     Row Name 10/07/22 1000          PT Short Term Goals    STG Date to Achieve 10/12/22  -CP     STG 1 Note a >/= 50% subjective improvement.  -CP     STG 1 Progress Met  -CP     STG 2 LEFS score to be >/= 40/80.  -CP     STG 2 Progress Progressing  -CP     STG 3 R knee active extension to 0 deg.  -CP     STG 3 Progress Met  -CP     STG 4 R knee active flexion to be >/= 100 deg.  -CP     STG 4 Progress Met  -CP     STG 5 Demonstrate ability to ambulate household distances or greater with cane.  -CP     STG 5 Progress Met  -CP            Long Term Goals    LTG Date to Achieve 11/16/22  -CP     LTG 1 Independent with HEP/self-management.  -CP     LTG 2 LEFS score to be >/= 60/80.  -CP     LTG 3 R knee active flexion to be >/= 120 deg.  -CP     LTG 4 Demonstrate ability to ambulate community distances on firm, level surfaces without assistive device.  -CP     LTG 5 Demonstrate ability to ascend 1 flight of stairs step-over-step with HR use PRN.  -CP            Time Calculation    PT Goal Re-Cert Due Date 10/12/22  -CP           User Key  (r) = Recorded By, (t) = Taken By, (c) = Cosigned By    Initials Name Provider Type    Adwoa Kennedy PTA Physical Therapist Assistant                               Time Calculation:   Start Time: 0930  Stop Time: 1038  Time Calculation (min): 68 min  Total Timed Code Minutes- PT: 48 minute(s)  Timed Charges  18551 - PT Therapeutic Exercise Minutes: 48  Untimed Charges  PT E-Stim Unattended Minutes: 15  PT Ice Rx Minutes: 15  Total Minutes  Timed Charges Total Minutes: 48  Untimed Charges Total Minutes: 30   Total Minutes: 48  Therapy Charges for Today     Code Description Service Date Service Provider Modifiers Qty    52506997868 HC PT ELECTRICAL STIM UNATTENDED 10/7/2022 Adwoa May PTA CQ 1    40156484193 HC PT THER PROC EA 15 MIN  10/7/2022 Adwoa May, JANESSA GP, CQ 3                    Adwoa May, JANESSA  10/7/2022

## 2022-10-10 ENCOUNTER — HOSPITAL ENCOUNTER (OUTPATIENT)
Dept: PHYSICAL THERAPY | Facility: HOSPITAL | Age: 67
Setting detail: THERAPIES SERIES
Discharge: HOME | End: 2022-10-10

## 2022-10-10 DIAGNOSIS — M17.11 PRIMARY OSTEOARTHRITIS OF RIGHT KNEE: Primary | ICD-10-CM

## 2022-10-10 PROCEDURE — 97530 THERAPEUTIC ACTIVITIES: CPT

## 2022-10-10 PROCEDURE — G0283 ELEC STIM OTHER THAN WOUND: HCPCS

## 2022-10-10 PROCEDURE — 97110 THERAPEUTIC EXERCISES: CPT

## 2022-10-10 NOTE — THERAPY TREATMENT NOTE
Outpatient Physical Therapy Ortho Treatment Note  [facility]     Patient Name: Sam Infante  : 1955  MRN: [id number]  Today's Date: 10/10/2022      Visit Date: 10/10/2022     Subjective Improvement 70%  Visits   Visits approved med [facility]  RTMD 2022  Recert Date 10-    S/P R TKA on 2022    Visit Dx:    ICD-10-CM ICD-9-CM   1. Primary osteoarthritis of right knee  M17.11 715.16       Patient Active Problem List   Diagnosis   • Borderline glaucoma, open angle with borderline findings   • Borderline glaucoma   • Left hand pain   • Bilateral carpal tunnel syndrome   • Right hip pain   • Primary osteoarthritis of right hip   • Essential hypertension   • Attention deficit hyperactivity disorder   • Benign prostatic hyperplasia   • Fatigue   • Gastroesophageal reflux disease without esophagitis   • Hyperlipidemia   • Mixed anxiety and depressive disorder   • BERNADETTE (obstructive sleep apnea)   • Routine physical examination   • Hx of total hip arthroplasty, right   • Right shoulder pain   • Rotator cuff impingement syndrome of right shoulder   • Impingement syndrome of right shoulder   • Presence of right hip implant   • Primary osteoarthritis of both knees   • Primary osteoarthritis of right knee   • Chronic pain of right knee   • S/P TKR (total knee replacement), right        Past Medical History:   Diagnosis Date   • Arthritis    • Elevated cholesterol    • GERD (gastroesophageal reflux disease)     OCCASIONAL   • High blood pressure    • Kidney stone    • Sleep apnea     using c-pap   • Vitreous floater 2013        Past Surgical History:   Procedure Laterality Date   • COLONOSCOPY     • SEPTOPLASTY  2013    Nasal surgery procedure (Nasal septoplasty.)   • TOTAL HIP ARTHROPLASTY Right 10/14/2019    Procedure: TOTAL HIP ARTHROPLASTY ANTERIOR;  Surgeon: Nathaniel Kate MD;  Location: [facility];  Service: Orthopedics   • TOTAL KNEE ARTHROPLASTY Right 2022     Procedure: RIGHT TOTAL KNEE ARTHROPLASTY WITH ADDUCTOR CANAL BLOCK;  Surgeon: Nathaniel Kate MD;  Location: [facility];  Service: Orthopedics;  Laterality: Right;        PT Ortho     Row Name 10/10/22 1300       Subjective Comments    Subjective Comments Patient is doing well and reporting decrease pain  -CP       Precautions and Contraindications    Precautions R TKA 9-  -CP    Contraindications 3 weeks post op  -CP       Subjective Pain    Able to rate subjective pain? yes  -CP    Pre-Treatment Pain Level 1  -CP       Posture/Observations    Posture/Observations Comments amb independnet  -CP       Right Lower Ext    Rt Knee Extension/Flexion AROM 0-105 long sit  -CP    RT Lower Extremity Comments 0-110 in supine  -CP          User Key  (r) = Recorded By, (t) = Taken By, (c) = Cosigned By    Initials Name Provider Type    Adwoa Kennedy PTA Physical Therapist Assistant                             PT Assessment/Plan     Row Name 10/10/22 1422          PT Assessment    Assessment Comments Patient cont to progress well with two new goals. met.  He did have increase in AROM which is greater in supine than long sit.  He is able to ascend a flight of stairs with step over step gait.  -CP        PT Plan    PT Frequency 3x/week  -CP     Predicted Duration of Therapy Intervention (PT) 6-8 weeks  -CP     PT Plan Comments Cont with POC. stretching and try SL stance, LAQ  -CP           User Key  (r) = Recorded By, (t) = Taken By, (c) = Cosigned By    Initials Name Provider Type    Adwoa Kennedy PTA Physical Therapist Assistant                 Modalities     Row Name 10/10/22 1300             Ice    Ice Applied Yes  -CP      Location right knee iwth ifc  -CP      PT Ice Rx Minutes 15  -CP      Ice S/P Rx Yes  -CP         ELECTRICAL STIMULATION    Attended/Unattended Unattended  -CP      Stimulation Type IFC  -CP      Location/Electrode Placement/Other right knee with ice  -CP      PT E-Stim  "Unattended Minutes 15  -CP            User Key  (r) = Recorded By, (t) = Taken By, (c) = Cosigned By    Initials Name Provider Type    Adwoa Kennedy, PTA Physical Therapist Assistant               OP Exercises     Row Name 10/10/22 1504 10/10/22 1300          Subjective Comments    Subjective Comments -- Patient is doing well and reporting decrease pain  -CP        Subjective Pain    Able to rate subjective pain? -- yes  -CP     Pre-Treatment Pain Level -- 1  -CP     Post-Treatment Pain Level -- 0  -CP        Total Minutes    05944 - PT Therapeutic Exercise Minutes 35  -CP --     46655 - PT Therapeutic Activity Minutes 10  -CP --        Exercise 1    Exercise Name 1 -- Pro Ii level 2  -CP     Time 1 -- 10  -CP        Exercise 2    Exercise Name 2 -- incline stretch  -CP     Cueing 2 -- Verbal  -CP     Sets 2 -- 3  -CP     Time 2 -- 30\"  -CP        Exercise 3    Exercise Name 3 -- standing HS stretch  -CP     Cueing 3 -- Verbal  -CP     Sets 3 -- 3  -CP     Time 3 -- 30  -CP        Exercise 4    Exercise Name 4 -- step up 6\"  -CP     Cueing 4 -- Verbal;Demo  -CP     Sets 4 -- 2  -CP     Reps 4 -- 10  -CP        Exercise 5    Exercise Name 5 -- ramp walks forward  -CP     Cueing 5 -- Verbal;Demo  -CP     Reps 5 -- 5  -CP        Exercise 6    Exercise Name 6 -- step down 4\"  -CP     Cueing 6 -- Verbal;Demo  -CP     Sets 6 -- 2  -CP     Reps 6 -- 10  -CP     Additional Comments -- landed on toe first  -CP        Exercise 7    Exercise Name 7 -- step up and over 4\"  -CP     Cueing 7 -- Verbal;Demo  -CP     Sets 7 -- 2  -CP     Reps 7 -- 10  -CP        Exercise 8    Exercise Name 8 -- ascend and descent stairs  -CP     Cueing 8 -- Verbal;Demo  -CP     Reps 8 -- 17  -CP     Additional Comments -- step over step ascending  -CP        Exercise 9    Exercise Name 9 -- sit to stand  -CP     Cueing 9 -- Verbal;Demo  -CP     Sets 9 -- 3  -CP     Reps 9 -- 5  -CP     Additional Comments -- independent  -CP        Exercise " 10    Exercise Name 10 -- seated LLPS knee fl stretch  -CP     Cueing 10 -- Verbal;Tactile  -CP     Sets 10 -- 3  -CP     Time 10 -- 30'  -CP        Exercise 11    Exercise Name 11 -- heelsides with strap  -CP     Cueing 11 -- Verbal;Tactile  -CP     Reps 11 -- 20  -CP        Exercise 12    Exercise Name 12 -- Prone knee hangs  -CP     Cueing 12 -- Verbal;Tactile  -CP     Reps 12 -- 2  -CP     Time 12 -- 2'  -CP           User Key  (r) = Recorded By, (t) = Taken By, (c) = Cosigned By    Initials Name Provider Type    CP Adwoa May, PTA Physical Therapist Assistant                              PT OP Goals     Row Name 10/10/22 1300          PT Short Term Goals    STG Date to Achieve 10/12/22  -CP     STG 1 Note a >/= 50% subjective improvement.  -CP     STG 1 Progress Met  -CP     STG 2 LEFS score to be >/= 40/80.  -CP     STG 2 Progress Progressing  -CP     STG 3 R knee active extension to 0 deg.  -CP     STG 3 Progress Met  -CP     STG 4 R knee active flexion to be >/= 100 deg.  -CP     STG 4 Progress Met  -CP     STG 5 Demonstrate ability to ambulate household distances or greater with cane.  -CP     STG 5 Progress Met  -CP        Long Term Goals    LTG Date to Achieve 11/16/22  -CP     LTG 1 Independent with HEP/self-management.  -CP     LTG 2 LEFS score to be >/= 60/80.  -CP     LTG 3 R knee active flexion to be >/= 120 deg.  -CP     LTG 4 Demonstrate ability to ambulate community distances on firm, level surfaces without assistive device.  -CP     LTG 4 Progress Met  -CP     LTG 5 Demonstrate ability to ascend 1 flight of stairs step-over-step with HR use PRN.  -CP     LTG 5 Progress Met  -CP        Time Calculation    PT Goal Re-Cert Due Date 10/12/22  -CP           User Key  (r) = Recorded By, (t) = Taken By, (c) = Cosigned By    Initials Name Provider Type    Adwoa Kennedy PTA Physical Therapist Assistant                Therapy Education  Education Details: prone knee hangs,  Given:  HEP  Program: New  How Provided: Verbal, Demonstration  Provided to: Patient  Level of Understanding: Teach back education performed, Verbalized, Demonstrated    Outcome Measure Options: Lower Extremity Functional Scale (LEFS)         Time Calculation:   Start Time: 1346  Stop Time: 1450  Time Calculation (min): 64 min  Total Timed Code Minutes- PT: 45 minute(s)  Timed Charges  52850 - PT Therapeutic Exercise Minutes: 35  99078 - PT Therapeutic Activity Minutes: 10  Untimed Charges  PT E-Stim Unattended Minutes: 15  PT Ice Rx Minutes: 15  Total Minutes  Timed Charges Total Minutes: 45  Untimed Charges Total Minutes: 30   Total Minutes: 45  Therapy Charges for Today     Code Description Service Date Service Provider Modifiers Qty    30994016585 HC PT ELECTRICAL STIM UNATTENDED 10/10/2022 Adwoa May, PTA CQ 1    26233928522 HC PT THERAPEUTIC ACT EA 15 MIN 10/10/2022 Adwoa May, PTA GP, CQ 1    89040468169 HC PT THER PROC EA 15 MIN 10/10/2022 Adwoa May, PTA GP, CQ 2          PT G-Codes  Outcome Measure Options: Lower Extremity Functional Scale (LEFS)         Adwoa May PTA  10/10/2022

## 2022-10-12 ENCOUNTER — APPOINTMENT (OUTPATIENT)
Dept: PHYSICAL THERAPY | Facility: HOSPITAL | Age: 67
End: 2022-10-12

## 2022-10-13 ENCOUNTER — HOSPITAL ENCOUNTER (OUTPATIENT)
Dept: PHYSICAL THERAPY | Facility: HOSPITAL | Age: 67
Setting detail: THERAPIES SERIES
Discharge: HOME | End: 2022-10-13

## 2022-10-13 DIAGNOSIS — M17.11 PRIMARY OSTEOARTHRITIS OF RIGHT KNEE: Primary | ICD-10-CM

## 2022-10-13 DIAGNOSIS — Z74.09 IMPAIRED FUNCTIONAL MOBILITY, BALANCE, GAIT, AND ENDURANCE: ICD-10-CM

## 2022-10-13 PROCEDURE — 97530 THERAPEUTIC ACTIVITIES: CPT

## 2022-10-13 PROCEDURE — G0283 ELEC STIM OTHER THAN WOUND: HCPCS

## 2022-10-13 NOTE — THERAPY TREATMENT NOTE
Outpatient Physical Therapy Ortho Treatment Note  [facility]     Patient Name: Sam Infante  : 1955  MRN: [id number]  Today's Date: 10/13/2022      Visit Date: 10/13/2022     Subjective Improvement 75%  Visits   Visits approved med [place] [facility]  RTMD 2022  Recert Date 10-    S/P R TKA on 2022    Visit Dx:    ICD-10-CM ICD-9-CM   1. Primary osteoarthritis of right knee  M17.11 715.16   2. Impaired functional mobility, balance, gait, and endurance  Z74.09 V49.89       Patient Active Problem List   Diagnosis   • Borderline glaucoma, open angle with borderline findings   • Borderline glaucoma   • Left hand pain   • Bilateral carpal tunnel syndrome   • Right hip pain   • Primary osteoarthritis of right hip   • Essential hypertension   • Attention deficit hyperactivity disorder   • Benign prostatic hyperplasia   • Fatigue   • Gastroesophageal reflux disease without esophagitis   • Hyperlipidemia   • Mixed anxiety and depressive disorder   • BERNADETTE (obstructive sleep apnea)   • Routine physical examination   • Hx of total hip arthroplasty, right   • Right shoulder pain   • Rotator cuff impingement syndrome of right shoulder   • Impingement syndrome of right shoulder   • Presence of right hip implant   • Primary osteoarthritis of both knees   • Primary osteoarthritis of right knee   • Chronic pain of right knee   • S/P TKR (total knee replacement), right        Past Medical History:   Diagnosis Date   • Arthritis    • Elevated cholesterol    • GERD (gastroesophageal reflux disease)     OCCASIONAL   • High blood pressure    • Kidney stone    • Sleep apnea     using c-pap   • Vitreous floater 2013        Past Surgical History:   Procedure Laterality Date   • COLONOSCOPY     • SEPTOPLASTY  2013    Nasal surgery procedure (Nasal septoplasty.)   • TOTAL HIP ARTHROPLASTY Right 10/14/2019    Procedure: TOTAL HIP ARTHROPLASTY ANTERIOR;  Surgeon: Nathaniel Kate MD;  Location:   [facility] OR;  Service: Orthopedics   • TOTAL KNEE ARTHROPLASTY Right 9/19/2022    Procedure: RIGHT TOTAL KNEE ARTHROPLASTY WITH ADDUCTOR CANAL BLOCK;  Surgeon: Nathaniel Kate MD;  Location: [facility] OR;  Service: Orthopedics;  Laterality: Right;        PT Ortho     Row Name 10/13/22 1400       Right Lower Ext    Rt Knee Extension/Flexion AROM 0-110 long sitting after thrapy  -CP          User Key  (r) = Recorded By, (t) = Taken By, (c) = Cosigned By    Initials Name Provider Type    Adwoa Kennedy, PTA Physical Therapist Assistant                             PT Assessment/Plan     Row Name 10/13/22 1450          PT Assessment    Assessment Comments Patient did present with increase right knee pain.  Patient was advised that he may be doing too much and to slightly decrease his activity.  He did have increase AROM for knee fl with date  -CP        PT Plan    PT Frequency 3x/week  -CP     Predicted Duration of Therapy Intervention (PT) 6-8 weeks  -CP     PT Plan Comments Cont with POC. possible cybex leg press  -CP           User Key  (r) = Recorded By, (t) = Taken By, (c) = Cosigned By    Initials Name Provider Type    Adwoa Kennedy PTA Physical Therapist Assistant                 Modalities     Row Name 10/13/22 1400             Ice    Ice Applied Yes  -CP      Location right knee iwth ifc  -CP      PT Ice Rx Minutes 15  -CP      Ice S/P Rx Yes  -CP         ELECTRICAL STIMULATION    Attended/Unattended Unattended  -CP      Stimulation Type IFC  -CP      Location/Electrode Placement/Other right knee with ice  -CP      PT E-Stim Unattended Minutes 15  -CP         Functional Testing    Outcome Measure Options Lower Extremity Functional Scale (LEFS)  -CP            User Key  (r) = Recorded By, (t) = Taken By, (c) = Cosigned By    Initials Name Provider Type    Adwoa Kennedy PTA Physical Therapist Assistant               OP Exercises     Row Name 10/13/22 1453 10/13/22 1400          Subjective  "Comments    Subjective Comments -- Patient reports increase pain today.  He is unsure why  -CP        Subjective Pain    Able to rate subjective pain? -- yes  -CP     Pre-Treatment Pain Level -- 5  -CP     Post-Treatment Pain Level -- 0  -CP        Total Minutes    27446 - PT Therapeutic Exercise Minutes 45  -CP --        Exercise 1    Exercise Name 1 -- Pro II level 3  -CP     Time 1 -- 10  -CP        Exercise 2    Exercise Name 2 -- incline stretch  -CP     Cueing 2 -- Verbal  -CP     Sets 2 -- 3  -CP     Time 2 -- 30\" holds  -CP        Exercise 3    Exercise Name 3 -- standing HS stretch  -CP     Cueing 3 -- Verbal  -CP     Sets 3 -- 3  -CP     Time 3 -- 30\" holds  -CP        Exercise 4    Exercise Name 4 -- step up 4\"  -CP     Cueing 4 -- Verbal;Demo  -CP     Sets 4 -- 2  -CP     Reps 4 -- 10  -CP        Exercise 5    Exercise Name 5 -- lat step up 4\"  -CP     Cueing 5 -- Verbal;Demo  -CP     Sets 5 -- 2  -CP     Reps 5 -- 10  -CP        Exercise 6    Exercise Name 6 -- seated knee fl with tband  -CP     Cueing 6 -- Verbal;Tactile;Demo  -CP     Sets 6 -- 2  -CP     Reps 6 -- 10  -CP     Time 6 -- green tband  -CP        Exercise 7    Exercise Name 7 -- bridging  -CP     Cueing 7 -- Verbal  -CP     Sets 7 -- 2  -CP     Reps 7 -- 10  -CP        Exercise 8    Exercise Name 8 -- supine manual  ::{S lmee f; stretcj  -CP     Cueing 8 -- Verbal;Tactile  -CP     Sets 8 -- 3  -CP     Time 8 -- 30\"  -CP        Exercise 9    Exercise Name 9 -- heellsides with strap  -CP     Cueing 9 -- Verbal  -CP     Reps 9 -- 20  -CP        Exercise 10    Exercise Name 10 -- QS with heel prop  -CP     Cueing 10 -- Verbal;Tactile  -CP     Sets 10 -- 1  -CP     Time 10 -- 2'  -CP           User Key  (r) = Recorded By, (t) = Taken By, (c) = Cosigned By    Initials Name Provider Type    CP Adwoa May, PTA Physical Therapist Assistant                              PT OP Goals     Row Name 10/13/22 1400          PT Short Term Goals    " STG Date to Achieve 10/12/22  -CP     STG 1 Note a >/= 50% subjective improvement.  -CP     STG 1 Progress Met  -CP     STG 2 LEFS score to be >/= 40/80.  -CP     STG 2 Progress Progressing  -CP     STG 3 R knee active extension to 0 deg.  -CP     STG 3 Progress Met  -CP     STG 4 R knee active flexion to be >/= 100 deg.  -CP     STG 4 Progress Met  -CP     STG 5 Demonstrate ability to ambulate household distances or greater with cane.  -CP     STG 5 Progress Met  -CP        Long Term Goals    LTG Date to Achieve 11/16/22  -CP     LTG 1 Independent with HEP/self-management.  -CP     LTG 2 LEFS score to be >/= 60/80.  -CP     LTG 3 R knee active flexion to be >/= 120 deg.  -CP     LTG 4 Demonstrate ability to ambulate community distances on firm, level surfaces without assistive device.  -CP     LTG 4 Progress Met  -CP     LTG 5 Demonstrate ability to ascend 1 flight of stairs step-over-step with HR use PRN.  -CP     LTG 5 Progress Met  -CP        Time Calculation    PT Goal Re-Cert Due Date 10/12/22  -CP           User Key  (r) = Recorded By, (t) = Taken By, (c) = Cosigned By    Initials Name Provider Type    Adwoa Kennedy, PTA Physical Therapist Assistant                Therapy Education  Education Details: May need to decrease activitiy if he is having increase knee pain  Given: Symptoms/condition management  Program: New  How Provided: Verbal  Provided to: Patient  Level of Understanding: Verbalized    Outcome Measure Options: Lower Extremity Functional Scale (LEFS)         Time Calculation:   Start Time: 1340  Stop Time: 1445  Time Calculation (min): 65 min  Total Timed Code Minutes- PT: 45 minute(s)  Timed Charges  56770 - PT Therapeutic Exercise Minutes: 45  Untimed Charges  PT E-Stim Unattended Minutes: 15  PT Ice Rx Minutes: 15  Total Minutes  Timed Charges Total Minutes: 45  Untimed Charges Total Minutes: 30   Total Minutes: 45  Therapy Charges for Today     Code Description Service Date Service  Provider Modifiers Qty    49245404108 HC PT THERAPEUTIC ACT EA 15 MIN 10/13/2022 Adwoa May PTA GP, CQ 3    24043549281 HC PT ELECTRICAL STIM UNATTENDED 10/13/2022 Adwoa May PTA CQ 1          PT G-Codes  Outcome Measure Options: Lower Extremity Functional Scale (LEFS)         Adwoa May PTA  10/13/2022

## 2022-10-17 ENCOUNTER — HOSPITAL ENCOUNTER (OUTPATIENT)
Dept: PHYSICAL THERAPY | Facility: HOSPITAL | Age: 67
Setting detail: THERAPIES SERIES
Discharge: HOME | End: 2022-10-17

## 2022-10-17 DIAGNOSIS — M17.11 PRIMARY OSTEOARTHRITIS OF RIGHT KNEE: Primary | ICD-10-CM

## 2022-10-17 DIAGNOSIS — Z74.09 IMPAIRED FUNCTIONAL MOBILITY, BALANCE, GAIT, AND ENDURANCE: ICD-10-CM

## 2022-10-17 PROCEDURE — G0283 ELEC STIM OTHER THAN WOUND: HCPCS

## 2022-10-17 PROCEDURE — 97110 THERAPEUTIC EXERCISES: CPT

## 2022-10-17 NOTE — THERAPY TREATMENT NOTE
Outpatient Physical Therapy Ortho Treatment Note  [facility]     Patient Name: Sam Infante  : 1955  MRN: [id number]  Today's Date: 10/17/2022      Visit Date: 10/17/2022     Subjective Improvement 75%  Visits 10/10  Visits approved med [facility]  RTMD 2022  Recert Date 10-    S/P R TKA on 2022    Visit Dx:    ICD-10-CM ICD-9-CM   1. Primary osteoarthritis of right knee  M17.11 715.16   2. Impaired functional mobility, balance, gait, and endurance  Z74.09 V49.89       Patient Active Problem List   Diagnosis   • Borderline glaucoma, open angle with borderline findings   • Borderline glaucoma   • Left hand pain   • Bilateral carpal tunnel syndrome   • Right hip pain   • Primary osteoarthritis of right hip   • Essential hypertension   • Attention deficit hyperactivity disorder   • Benign prostatic hyperplasia   • Fatigue   • Gastroesophageal reflux disease without esophagitis   • Hyperlipidemia   • Mixed anxiety and depressive disorder   • BERNADETTE (obstructive sleep apnea)   • Routine physical examination   • Hx of total hip arthroplasty, right   • Right shoulder pain   • Rotator cuff impingement syndrome of right shoulder   • Impingement syndrome of right shoulder   • Presence of right hip implant   • Primary osteoarthritis of both knees   • Primary osteoarthritis of right knee   • Chronic pain of right knee   • S/P TKR (total knee replacement), right        Past Medical History:   Diagnosis Date   • Arthritis    • Elevated cholesterol    • GERD (gastroesophageal reflux disease)     OCCASIONAL   • High blood pressure    • Kidney stone    • Sleep apnea     using c-pap   • Vitreous floater 2013        Past Surgical History:   Procedure Laterality Date   • COLONOSCOPY     • SEPTOPLASTY  2013    Nasal surgery procedure (Nasal septoplasty.)   • TOTAL HIP ARTHROPLASTY Right 10/14/2019    Procedure: TOTAL HIP ARTHROPLASTY ANTERIOR;  Surgeon: Nathaniel Kate MD;  Location:  [facility] OR;  Service: Orthopedics   • TOTAL KNEE ARTHROPLASTY Right 9/19/2022    Procedure: RIGHT TOTAL KNEE ARTHROPLASTY WITH ADDUCTOR CANAL BLOCK;  Surgeon: Nathaniel Kate MD;  Location: [facility] OR;  Service: Orthopedics;  Laterality: Right;        PT Ortho     Row Name 10/17/22 0800       Subjective Comments    Subjective Comments Patient did have some increase pain over the weekend.  Today his pain is 2/10  -CP       Precautions and Contraindications    Precautions R TKA 9-  -CP    Contraindications 4 weeks post op  -CP       Subjective Pain    Able to rate subjective pain? yes  -CP    Pre-Treatment Pain Level 2  -CP       Right Lower Ext    Rt Knee Extension/Flexion AROM 0-112 after stretching and ther ex  -CP          User Key  (r) = Recorded By, (t) = Taken By, (c) = Cosigned By    Initials Name Provider Type    Adwoa Kennedy, PTA Physical Therapist Assistant                             PT Assessment/Plan     Row Name 10/17/22 1003          PT Assessment    Assessment Comments patient is progressing well.  He did have increase AROM for right knee fl this date.  edema noted in right LE  -CP        PT Plan    PT Frequency 3x/week  -CP     Predicted Duration of Therapy Intervention (PT) 6-8 weeks  -CP     PT Plan Comments Cont with POC.  recheck next visit. Step down next  -CP           User Key  (r) = Recorded By, (t) = Taken By, (c) = Cosigned By    Initials Name Provider Type    Adwoa Kennedy PTA Physical Therapist Assistant                 Modalities     Row Name 10/17/22 0800             Ice    Ice Applied Yes  -CP      Location right knee with IFC  -CP      PT Ice Rx Minutes 15  -CP      Ice S/P Rx Yes  -CP         ELECTRICAL STIMULATION    Attended/Unattended Unattended  -CP      Stimulation Type IFC  -CP      Location/Electrode Placement/Other right knee with ice  -CP      PT E-Stim Unattended Minutes 15  -CP            User Key  (r) = Recorded By, (t) = Taken By, (c) =  "Cosigned By    Initials Name Provider Type    CP Adwoa May, PTA Physical Therapist Assistant               OP Exercises     Row Name 10/17/22 0800             Subjective Comments    Subjective Comments Patient did have some increase pain over the weekend.  Today his pain is 2/10  -CP         Subjective Pain    Able to rate subjective pain? yes  -CP      Pre-Treatment Pain Level 2  -CP      Post-Treatment Pain Level 0  -CP         Exercise 1    Exercise Name 1 Pro II level 3  -CP      Time 1 5/5  -CP      Additional Comments 12/9 seat  -CP         Exercise 2    Exercise Name 2 incline stretch  -CP      Cueing 2 Verbal;Demo  -CP      Sets 2 3  -CP      Time 2 30\" holds  -CP         Exercise 3    Exercise Name 3 standing HS Stretch  -CP      Cueing 3 Verbal;Demo  -CP      Sets 3 3  -CP      Time 3 30\" holds  -CP         Exercise 4    Exercise Name 4 step up 6\"  -CP      Cueing 4 Verbal;Demo  -CP      Sets 4 2  -CP      Reps 4 10  -CP         Exercise 5    Exercise Name 5 step up and over 4\"  -CP      Cueing 5 Verbal;Demo  -CP      Sets 5 2  -CP      Reps 5 10  -CP      Time 5 handrail assist  -CP         Exercise 6    Exercise Name 6 ramp walk  -CP      Cueing 6 Verbal;Demo  -CP      Reps 6 5 each  -CP      Time 6 forward and backward  -CP         Exercise 7    Exercise Name 7 SL stance  -CP      Cueing 7 Verbal;Demo  -CP      Sets 7 3  -CP      Time 7 10\" holds  -CP         Exercise 8    Exercise Name 8 cybex leg press  -CP      Cueing 8 Verbal;Tactile  -CP      Sets 8 3  -CP      Reps 8 10  -CP      Time 8 75 lb  -CP         Exercise 9    Exercise Name 9 seated LLPS knee fl stretch  -CP      Cueing 9 Verbal;Tactile  -CP      Sets 9 5  -CP      Time 9 30\" holds  -CP         Exercise 10    Exercise Name 10 heelslides with strap  -CP      Cueing 10 Verbal;Tactile  -CP      Reps 10 20  -CP         Exercise 11    Exercise Name 11 QS with heel prop  -CP      Cueing 11 Verbal;Tactile  -CP      Sets 11 2  -CP      " "Reps 11 10  -CP      Time 11 5\" holds  -CP         Exercise 12    Exercise Name 12 SLR  -CP      Cueing 12 Verbal;Tactile  -CP      Reps 12 2  -CP      Time 12 10  -CP      Additional Comments 3\" holds  -CP            User Key  (r) = Recorded By, (t) = Taken By, (c) = Cosigned By    Initials Name Provider Type    CP Adwoa May, PTA Physical Therapist Assistant                              PT OP Goals     Row Name 10/17/22 0800          PT Short Term Goals    STG Date to Achieve 10/12/22  -CP     STG 1 Note a >/= 50% subjective improvement.  -CP     STG 1 Progress Met  -CP     STG 2 LEFS score to be >/= 40/80.  -CP     STG 2 Progress Progressing  -CP     STG 3 R knee active extension to 0 deg.  -CP     STG 3 Progress Met  -CP     STG 4 R knee active flexion to be >/= 100 deg.  -CP     STG 4 Progress Met  -CP     STG 5 Demonstrate ability to ambulate household distances or greater with cane.  -CP     STG 5 Progress Met  -CP        Long Term Goals    LTG Date to Achieve 11/16/22  -CP     LTG 1 Independent with HEP/self-management.  -CP     LTG 2 LEFS score to be >/= 60/80.  -CP     LTG 3 R knee active flexion to be >/= 120 deg.  -CP     LTG 4 Demonstrate ability to ambulate community distances on firm, level surfaces without assistive device.  -CP     LTG 4 Progress Met  -CP     LTG 5 Demonstrate ability to ascend 1 flight of stairs step-over-step with HR use PRN.  -CP     LTG 5 Progress Met  -CP        Time Calculation    PT Goal Re-Cert Due Date 10/12/22  -CP           User Key  (r) = Recorded By, (t) = Taken By, (c) = Cosigned By    Initials Name Provider Type    CP Adwoa May, PTA Physical Therapist Assistant                               Time Calculation:   Untimed Charges  PT E-Stim Unattended Minutes: 15  PT Ice Rx Minutes: 15  Total Minutes  Untimed Charges Total Minutes: 30   Total Minutes: 30  Therapy Charges for Today     Code Description Service Date Service Provider Modifiers Qty    " 45240860146 HC PT ELECTRICAL STIM UNATTENDED 10/17/2022 Adwoa May, PTA CQ 1    64841345803 HC PT THER PROC EA 15 MIN 10/17/2022 Adwoa May PTA GP, CQ 3                    Adwoa May, JANESSA  10/17/2022

## 2022-10-19 ENCOUNTER — HOSPITAL ENCOUNTER (OUTPATIENT)
Dept: PHYSICAL THERAPY | Facility: HOSPITAL | Age: 67
Setting detail: THERAPIES SERIES
Discharge: HOME | End: 2022-10-19

## 2022-10-19 DIAGNOSIS — Z74.09 IMPAIRED FUNCTIONAL MOBILITY, BALANCE, GAIT, AND ENDURANCE: ICD-10-CM

## 2022-10-19 DIAGNOSIS — M17.11 PRIMARY OSTEOARTHRITIS OF RIGHT KNEE: Primary | ICD-10-CM

## 2022-10-19 DIAGNOSIS — Z96.651 S/P TKR (TOTAL KNEE REPLACEMENT), RIGHT: ICD-10-CM

## 2022-10-19 PROCEDURE — G0283 ELEC STIM OTHER THAN WOUND: HCPCS | Performed by: PHYSICAL THERAPIST

## 2022-10-19 PROCEDURE — 97110 THERAPEUTIC EXERCISES: CPT | Performed by: PHYSICAL THERAPIST

## 2022-10-19 NOTE — THERAPY PROGRESS REPORT/RE-CERT
Outpatient Physical Therapy Ortho Progress Note  [facility]     Patient Name: Sam Infante  : 1955  MRN: [id number]  Today's Date: 10/19/2022      Visit Date: 10/19/2022    Attendance:  (medical necessity)  Subjective Improvement: 60%  Next MD Appt: 22  Recert Date: 22     Therapy Diagnosis: R total knee arthroplasty, 22    Changes in Medications: none   Changes in MD Orders: none noted  Number of Work Days Lost: n/a       Past Medical History:   Diagnosis Date   • Arthritis    • Elevated cholesterol    • GERD (gastroesophageal reflux disease)     OCCASIONAL   • High blood pressure    • Kidney stone    • Sleep apnea     using c-pap   • Vitreous floater 2013        Past Surgical History:   Procedure Laterality Date   • COLONOSCOPY     • SEPTOPLASTY  2013    Nasal surgery procedure (Nasal septoplasty.)   • TOTAL HIP ARTHROPLASTY Right 10/14/2019    Procedure: TOTAL HIP ARTHROPLASTY ANTERIOR;  Surgeon: Nathaniel Kate MD;  Location: [facility];  Service: Orthopedics   • TOTAL KNEE ARTHROPLASTY Right 2022    Procedure: RIGHT TOTAL KNEE ARTHROPLASTY WITH ADDUCTOR CANAL BLOCK;  Surgeon: Nathaniel Kate MD;  Location: [facility];  Service: Orthopedics;  Laterality: Right;       Visit Dx:     ICD-10-CM ICD-9-CM   1. Primary osteoarthritis of right knee  M17.11 715.16   2. Impaired functional mobility, balance, gait, and endurance  Z74.09 V49.89   3. S/P TKR (total knee replacement), right  Z96.651 V43.65              PT Ortho     Row Name 10/19/22 0900       Subjective Comments    Subjective Comments Knee is doing pretty well. Has been working on going down stairs. Going upstairs doesn't hurt as much. Feels like knee is strengthening. 60% subjective improvement. No medication changes.  -SS       Precautions and Contraindications    Precautions R TKA 2022  -SS       Subjective Pain    Able to rate subjective pain? yes  -SS    Pre-Treatment Pain  "Level 2  -    Post-Treatment Pain Level --  \"Pretty good today.\"  -       Posture/Observations    Posture/Observations Comments Independent gait. Minimal gait deviation noted. Surgical incision well healed.  -SS       Right Lower Ext    Rt Knee Extension/Flexion AROM 0-2-105 deg without warm-up  -       MMT (Manual Muscle Testing)    Rt Lower Ext Rt Hip Flexion;Rt Hip Extension;Rt Hip ABduction;Rt Hip ADduction;Rt Hip Internal (Medial) Rotation;Rt Hip External (Lateral) Rotation;Rt Knee Extension;Rt Knee Flexion;Comments  -       MMT Right Lower Ext    Rt Hip Flexion MMT, Gross Movement (5/5) normal  -SS    Rt Hip Extension MMT, Gross Movement (5/5) normal  -SS    Rt Hip ABduction MMT, Gross Movement (5/5) normal  -SS    Rt Hip ADduction MMT, Gross Movement (5/5) normal  -SS    Rt Hip Internal (Medial) Rotation MMT, Gross Movement (4/5) good  -SS    Rt Hip External (Lateral) Rotation MMT, Gross Movement (4/5) good  -    Rt Knee Extension MMT, Gross Movement (4-/5) good minus  \"a little sore\"  -    Rt Knee Flexion MMT, Gross Movement (4/5) good  \"a little sore\"  -    Rt Lower Extremity Comments  Flexion SLR 5/5  -       Sensation    Additional Comments Sensation intact to light touch.  -    Row Name 10/19/22 0800       Subjective Comments    Subjective Comments --  -       Precautions and Contraindications    Precautions --  -       Subjective Pain    Able to rate subjective pain? --  -    Pre-Treatment Pain Level --  -       Posture/Observations    Posture/Observations Comments --  -       Right Lower Ext    Rt Knee Extension/Flexion AROM --  -       MMT (Manual Muscle Testing)    Rt Lower Ext --  -       MMT Right Lower Ext    Rt Hip Flexion MMT, Gross Movement --  -    Rt Hip Extension MMT, Gross Movement --  -    Rt Hip ABduction MMT, Gross Movement --  -    Rt Hip ADduction MMT, Gross Movement --  -    Rt Hip Internal (Medial) Rotation MMT, Gross Movement --  -    Rt Hip " External (Lateral) Rotation MMT, Gross Movement --  -SS    Rt Knee Extension MMT, Gross Movement --  -SS    Rt Knee Flexion MMT, Gross Movement --  -SS    Rt Lower Extremity Comments  --  -SS       Sensation    Additional Comments --  -          User Key  (r) = Recorded By, (t) = Taken By, (c) = Cosigned By    Initials Name Provider Type    SS Tang Cole, PT DPT Physical Therapist                            Therapy Education  Education Details: progress, plan  How Provided: Verbal  Provided to: Patient  Level of Understanding: Verbalized      PT OP Goals     Row Name 10/19/22 0800          PT Short Term Goals    STG Date to Achieve --  further STGs deferred  -     STG 1 Note a >/= 50% subjective improvement.  -     STG 1 Progress Met  -     STG 2 LEFS score to be >/= 40/80.  -     STG 2 Progress Met  -     STG 3 R knee active extension to 0 deg.  -     STG 3 Progress Met  -     STG 4 R knee active flexion to be >/= 100 deg.  -     STG 4 Progress Met  -     STG 5 Demonstrate ability to ambulate household distances or greater with cane.  -     STG 5 Progress Met  -        Long Term Goals    LTG Date to Achieve 11/16/22  -     LTG 1 Independent with HEP/self-management.  -     LTG 1 Progress Ongoing  -     LTG 2 LEFS score to be >/= 60/80.  -     LTG 2 Progress Ongoing  -     LTG 3 R knee active flexion to be >/= 120 deg.  -     LTG 3 Progress Ongoing  -     LTG 4 Demonstrate ability to ambulate community distances on firm, level surfaces without assistive device.  -     LTG 4 Progress Met  -     LTG 5 Demonstrate ability to ascend 1 flight of stairs step-over-step with HR use PRN.  -     LTG 5 Progress Met  -        Time Calculation    PT Goal Re-Cert Due Date 11/16/22  -           User Key  (r) = Recorded By, (t) = Taken By, (c) = Cosigned By    Initials Name Provider Type    Tang Kapadia, PT DPT Physical Therapist                 PT  Assessment/Plan     Row Name 10/19/22 0900          PT Assessment    Functional Limitations Impaired gait;Limitation in home management;Limitations in community activities;Limitations in functional capacity and performance;Performance in leisure activities;Performance in self-care ADL;Performance in work activities  -     Impairments Balance;Edema;Endurance;Gait;Integumentary integrity;Muscle strength;Pain;Range of motion  -     Assessment Comments Patient had to leave after 10 minutes of IFC estim and ice. Patient is improving fairly well at this time. Would benefit from additional P.T. to improve ROM, strength, and function.  -     Rehab Potential Good  -     Patient/caregiver participated in establishment of treatment plan and goals Yes  -     Patient would benefit from skilled therapy intervention Yes  -SS        PT Plan    PT Frequency 2x/week;3x/week  -     Predicted Duration of Therapy Intervention (PT) 4 weeks  -     PT Plan Comments ROM, stretching, strengthening, balance training, gait training, ice with or without IFC estim as needed for pain/edema  -           User Key  (r) = Recorded By, (t) = Taken By, (c) = Cosigned By    Initials Name Provider Type     Tang Cole, PT DPT Physical Therapist                 Modalities     Row Name 10/19/22 0900             Ice    Ice Applied Yes  -SS      Location right knee with IFC  -      Ice S/P Rx Yes  -         ELECTRICAL STIMULATION    Attended/Unattended Unattended  -      Stimulation Type IFC  -      PT E-Stim Unattended Minutes 10  -SS            User Key  (r) = Recorded By, (t) = Taken By, (c) = Cosigned By    Initials Name Provider Type    Tang Kapadia, PT DPT Physical Therapist               OP Exercises     Row Name 10/19/22 0900          Subjective Comments    Subjective Comments Knee is doing pretty well. Has been working on going down stairs. Going upstairs doesn't hurt as much. Feels like knee is  "strengthening. 60% subjective improvement. No medication changes.  -SS        Subjective Pain    Able to rate subjective pain? yes  -SS     Pre-Treatment Pain Level 2  -SS     Post-Treatment Pain Level --  \"Pretty good today.\"  -SS        Exercise 1    Exercise Name 1 recheck  -SS        Exercise 2    Exercise Name 2 debride dead skin from knee  -SS        Exercise 3    Exercise Name 3 Pro2, Seat 8, LE, ROM & endurance  -SS     Cueing 3 Verbal  -SS     Time 3 10 mins  -SS     Additional Comments Level 3.5  -SS        Exercise 4    Exercise Name 4 Standing HS stretch  -SS     Cueing 4 Verbal;Demo  -SS     Sets 4 3  -SS     Time 4 30 sec hold  -SS        Exercise 5    Exercise Name 5 Lunge stretch for knee flexion  -SS     Cueing 5 Verbal;Demo  -SS     Sets 5 3  -SS     Time 5 30 sec hold  -SS        Exercise 6    Exercise Name 6 Step ups  -SS     Cueing 6 Verbal;Demo  -SS     Sets 6 2  -SS     Reps 6 10  -SS     Additional Comments 6-inch  -SS        Exercise 7    Exercise Name 7 Step downs  -SS     Cueing 7 Verbal;Demo  -SS     Sets 7 1  -SS     Reps 7 10  -SS     Additional Comments 6-inch  -SS        Exercise 8    Exercise Name 8 R SLS - eyes open  -SS     Cueing 8 Verbal;Demo  -SS     Sets 8 3  -SS     Time 8 30 sec  -SS        Exercise 9    Exercise Name 9 Heel slide with strap  -SS     Cueing 9 Verbal;Demo  -SS     Sets 9 1  -SS     Reps 9 20  -SS     Time 9 5 sec hold  -SS        Exercise 10    Exercise Name 10 check ROM  -SS        Exercise 11    Exercise Name 11 see Modalities  -SS           User Key  (r) = Recorded By, (t) = Taken By, (c) = Cosigned By    Initials Name Provider Type    Tang Kapadia, PT DPT Physical Therapist                              Outcome Measure Options: Lower Extremity Functional Scale (LEFS)  Lower Extremity Functional Index  Any of your usual work, housework or school activities: Moderate difficulty  Your usual hobbies, recreational or sporting activities: Moderate " difficulty  Getting into or out of the bath: A little bit of difficulty  Walking between rooms: No difficulty  Putting on your shoes or socks: Moderate difficulty  Squatting: Moderate difficulty  Lifting an object, like a bag of groceries from the floor: A little bit of difficulty  Performing light activities around your home: No difficulty  Performing heavy activities around your home: A little bit of difficulty  Getting into or out of a car: A little bit of difficulty  Walking 2 blocks: A little bit of difficulty  Walking a mile: Moderate difficulty  Going up or down 10 stairs (about 1 flight of stairs): Moderate difficulty  Standing for 1 hour: Moderate difficulty  Sitting for 1 hour: No difficulty  Running on even ground: A little bit of difficulty  Running on uneven ground: Extreme difficulty or unable to perform activity  Making sharp turns while running fast: Extreme difficulty or unable to perform activity  Hopping: Quite a bit of difficulty  Rolling over in bed: Moderate difficulty  Total: 47      Time Calculation:     Start Time: 0848  Stop Time: 0948  Time Calculation (min): 60 min  Untimed Charges  PT E-Stim Unattended Minutes: 10  Total Minutes  Untimed Charges Total Minutes: 10   Total Minutes: 10     Therapy Charges for Today     Code Description Service Date Service Provider Modifiers Qty    90927446184 HC PT THER PROC EA 15 MIN 10/19/2022 Tang Cole, PT DPT GP 3    45858195415 HC PT ELECTRICAL STIM UNATTENDED 10/19/2022 Tang Cole, PT DPT  1                   Tang Cole, PT, DPT, CHT  10/19/2022

## 2022-10-21 ENCOUNTER — HOSPITAL ENCOUNTER (OUTPATIENT)
Dept: PHYSICAL THERAPY | Facility: HOSPITAL | Age: 67
Setting detail: THERAPIES SERIES
Discharge: HOME | End: 2022-10-21

## 2022-10-21 DIAGNOSIS — Z96.651 S/P TKR (TOTAL KNEE REPLACEMENT), RIGHT: ICD-10-CM

## 2022-10-21 DIAGNOSIS — Z74.09 IMPAIRED FUNCTIONAL MOBILITY, BALANCE, GAIT, AND ENDURANCE: ICD-10-CM

## 2022-10-21 DIAGNOSIS — M17.11 PRIMARY OSTEOARTHRITIS OF RIGHT KNEE: Primary | ICD-10-CM

## 2022-10-21 PROCEDURE — 97110 THERAPEUTIC EXERCISES: CPT

## 2022-10-21 PROCEDURE — G0283 ELEC STIM OTHER THAN WOUND: HCPCS

## 2022-10-21 NOTE — THERAPY TREATMENT NOTE
Outpatient Physical Therapy Ortho Treatment Note  [facility]     Patient Name: Sam Infante  : 1955  MRN: [id number]  Today's Date: 10/21/2022      Visit Date: 10/21/2022     Subjective IMprovement 60%  Visits 12/  Visits approved med nec  RTMD 2022  Recert Date 2022    Visit Dx:    ICD-10-CM ICD-9-CM   1. Primary osteoarthritis of right knee  M17.11 715.16   2. Impaired functional mobility, balance, gait, and endurance  Z74.09 V49.89   3. S/P TKR (total knee replacement), right  Z96.651 V43.65       Patient Active Problem List   Diagnosis   • Borderline glaucoma, open angle with borderline findings   • Borderline glaucoma   • Left hand pain   • Bilateral carpal tunnel syndrome   • Right hip pain   • Primary osteoarthritis of right hip   • Essential hypertension   • Attention deficit hyperactivity disorder   • Benign prostatic hyperplasia   • Fatigue   • Gastroesophageal reflux disease without esophagitis   • Hyperlipidemia   • Mixed anxiety and depressive disorder   • BERNADETTE (obstructive sleep apnea)   • Routine physical examination   • Hx of total hip arthroplasty, right   • Right shoulder pain   • Rotator cuff impingement syndrome of right shoulder   • Impingement syndrome of right shoulder   • Presence of right hip implant   • Primary osteoarthritis of both knees   • Primary osteoarthritis of right knee   • Chronic pain of right knee   • S/P TKR (total knee replacement), right        Past Medical History:   Diagnosis Date   • Arthritis    • Elevated cholesterol    • GERD (gastroesophageal reflux disease)     OCCASIONAL   • High blood pressure    • Kidney stone    • Sleep apnea     using c-pap   • Vitreous floater 2013        Past Surgical History:   Procedure Laterality Date   • COLONOSCOPY     • SEPTOPLASTY  2013    Nasal surgery procedure (Nasal septoplasty.)   • TOTAL HIP ARTHROPLASTY Right 10/14/2019    Procedure: TOTAL HIP ARTHROPLASTY ANTERIOR;  Surgeon:  Nathaniel Kate MD;  Location: [facility];  Service: Orthopedics   • TOTAL KNEE ARTHROPLASTY Right 9/19/2022    Procedure: RIGHT TOTAL KNEE ARTHROPLASTY WITH ADDUCTOR CANAL BLOCK;  Surgeon: Nathaniel Kate MD;  Location: [facility];  Service: Orthopedics;  Laterality: Right;        PT Ortho     Row Name 10/21/22 1100       Right Lower Ext    Rt Knee Extension/Flexion AROM 0-116 after therapy  -CP    Row Name 10/21/22 1000       Precautions and Contraindications    Precautions R TKA 9-  -CP    Contraindications Post op 4 wks 4 days  -CP       Subjective Pain    Able to rate subjective pain? yes  -CP    Pre-Treatment Pain Level 2  -CP          User Key  (r) = Recorded By, (t) = Taken By, (c) = Cosigned By    Initials Name Provider Type    Adwoa Kennedy, PTA Physical Therapist Assistant                             PT Assessment/Plan     Row Name 10/21/22 1107          PT Assessment    Assessment Comments Patient was able to ascend and descend stairs with step over step.  He did have increase in AROM for fl  -CP        PT Plan    PT Frequency 2x/week;3x/week  -CP     Predicted Duration of Therapy Intervention (PT) 4 weeks  -CP     PT Plan Comments Cont with POC.  SL stance next  -CP           User Key  (r) = Recorded By, (t) = Taken By, (c) = Cosigned By    Initials Name Provider Type    Adwoa Kennedy, PTA Physical Therapist Assistant                 Modalities     Row Name 10/21/22 1000             Ice    Ice Applied Yes  -CP      Location right knee with IFC  -CP      PT Ice Rx Minutes 15  -CP      Ice S/P Rx Yes  -CP         ELECTRICAL STIMULATION    Attended/Unattended Unattended  -CP      Stimulation Type IFC  -CP      Location/Electrode Placement/Other right knee with IFC  -CP      PT E-Stim Unattended Minutes 15  -CP            User Key  (r) = Recorded By, (t) = Taken By, (c) = Cosigned By    Initials Name Provider Type    Adwoa Kennedy, PTA Physical Therapist Assistant  "              OP Exercises     Row Name 10/21/22 1128 10/21/22 1000          Subjective Comments    Subjective Comments -- Doing well today  -CP        Subjective Pain    Able to rate subjective pain? -- yes  -CP     Pre-Treatment Pain Level -- 2  -CP     Post-Treatment Pain Level -- 0  -CP        Total Minutes    47425 - PT Therapeutic Exercise Minutes 46  -CP --        Exercise 1    Exercise Name 1 -- Pro II level 4  -CP     Time 1 -- 10  -CP        Exercise 2    Exercise Name 2 -- incline stretch  -CP     Cueing 2 -- Verbal;Demo  -CP     Sets 2 -- 3  -CP     Time 2 -- 30\" holds  -CP        Exercise 3    Exercise Name 3 -- step up [facility]  -CP     Cueing 3 -- Verbal;Demo  -CP     Sets 3 -- 2  -CP     Reps 3 -- 10  -CP        Exercise 4    Exercise Name 4 -- ramp walk backward  -CP     Cueing 4 -- Verbal;Demo  -CP     Reps 4 -- 5  -CP        Exercise 5    Exercise Name 5 -- step down 4\"  -CP     Cueing 5 -- Verbal;Demo  -CP     Sets 5 -- 2  -CP     Reps 5 -- 10  -CP        Exercise 6    Exercise Name 6 -- stairs in clinic  -CP     Cueing 6 -- Verbal;Demo  -CP     Reps 6 -- 5  -CP     Additional Comments -- step over step  -CP        Exercise 7    Exercise Name 7 -- cybex leg press  -CP     Cueing 7 -- Verbal;Tactile  -CP     Sets 7 -- 3  -CP     Reps 7 -- 10  -CP     Time 7 -- 75 lb  -CP        Exercise 8    Exercise Name 8 -- Heelslides with strap  -CP     Cueing 8 -- Verbal;Tactile  -CP     Reps 8 -- 30  -CP           User Key  (r) = Recorded By, (t) = Taken By, (c) = Cosigned By    Initials Name Provider Type    CP Adwoa May, PTA Physical Therapist Assistant                              PT OP Goals     Row Name 10/21/22 1000          PT Short Term Goals    STG Date to Achieve --  further STGs deferred  -CP     STG 1 Note a >/= 50% subjective improvement.  -CP     STG 1 Progress Met  -CP     STG 2 LEFS score to be >/= 40/80.  -CP     STG 2 Progress Met  -CP     STG 3 R knee active extension to 0 deg.  -CP  "    STG 3 Progress Met  -CP     STG 4 R knee active flexion to be >/= 100 deg.  -CP     STG 4 Progress Met  -CP     STG 5 Demonstrate ability to ambulate household distances or greater with cane.  -CP     STG 5 Progress Met  -CP        Long Term Goals    LTG Date to Achieve 11/16/22  -CP     LTG 1 Independent with HEP/self-management.  -CP     LTG 1 Progress Ongoing  -CP     LTG 2 LEFS score to be >/= 60/80.  -CP     LTG 2 Progress Ongoing  -CP     LTG 3 R knee active flexion to be >/= 120 deg.  -CP     LTG 3 Progress Ongoing  -CP     LTG 4 Demonstrate ability to ambulate community distances on firm, level surfaces without assistive device.  -CP     LTG 4 Progress Met  -CP     LTG 5 Demonstrate ability to ascend 1 flight of stairs step-over-step with HR use PRN.  -CP     LTG 5 Progress Met  -CP        Time Calculation    PT Goal Re-Cert Due Date 11/16/22  -CP           User Key  (r) = Recorded By, (t) = Taken By, (c) = Cosigned By    Initials Name Provider Type    CP Adwoa May PTA Physical Therapist Assistant                               Time Calculation:   Start Time: 1015  Stop Time: 1122  Time Calculation (min): 67 min  Total Timed Code Minutes- PT: 46 minute(s)  Timed Charges  43030 - PT Therapeutic Exercise Minutes: 46  Untimed Charges  PT E-Stim Unattended Minutes: 15  PT Ice Rx Minutes: 15  Total Minutes  Timed Charges Total Minutes: 46  Untimed Charges Total Minutes: 30   Total Minutes: 46  Therapy Charges for Today     Code Description Service Date Service Provider Modifiers Qty    43914048279 HC PT ELECTRICAL STIM UNATTENDED 10/21/2022 Adwoa May PTA CQ 1    65213177155 HC PT THER PROC EA 15 MIN 10/21/2022 Adwoa May PTA GP, CQ 3                    Adwoa May PTA  10/21/2022

## 2022-10-24 ENCOUNTER — HOSPITAL ENCOUNTER (OUTPATIENT)
Dept: PHYSICAL THERAPY | Facility: HOSPITAL | Age: 67
Setting detail: THERAPIES SERIES
Discharge: HOME | End: 2022-10-24

## 2022-10-24 DIAGNOSIS — I10 ESSENTIAL HYPERTENSION: ICD-10-CM

## 2022-10-24 DIAGNOSIS — M17.11 PRIMARY OSTEOARTHRITIS OF RIGHT KNEE: Primary | ICD-10-CM

## 2022-10-24 DIAGNOSIS — Z74.09 IMPAIRED FUNCTIONAL MOBILITY, BALANCE, GAIT, AND ENDURANCE: ICD-10-CM

## 2022-10-24 DIAGNOSIS — M17.0 PRIMARY OSTEOARTHRITIS OF BOTH KNEES: ICD-10-CM

## 2022-10-24 DIAGNOSIS — M17.11 PRIMARY OSTEOARTHRITIS OF RIGHT KNEE: ICD-10-CM

## 2022-10-24 DIAGNOSIS — Z96.651 S/P TKR (TOTAL KNEE REPLACEMENT), RIGHT: ICD-10-CM

## 2022-10-24 DIAGNOSIS — Z96.641 PRESENCE OF RIGHT HIP IMPLANT: ICD-10-CM

## 2022-10-24 DIAGNOSIS — K21.9 GASTROESOPHAGEAL REFLUX DISEASE WITHOUT ESOPHAGITIS: ICD-10-CM

## 2022-10-24 DIAGNOSIS — G47.33 OSA (OBSTRUCTIVE SLEEP APNEA): ICD-10-CM

## 2022-10-24 PROCEDURE — 97530 THERAPEUTIC ACTIVITIES: CPT

## 2022-10-24 PROCEDURE — 97110 THERAPEUTIC EXERCISES: CPT

## 2022-10-24 RX ORDER — OXYCODONE AND ACETAMINOPHEN 7.5; 325 MG/1; MG/1
1 TABLET ORAL EVERY 8 HOURS PRN
Qty: 30 TABLET | Refills: 0 | Status: SHIPPED | OUTPATIENT
Start: 2022-10-24 | End: 2022-12-27

## 2022-10-24 NOTE — THERAPY TREATMENT NOTE
Outpatient Physical Therapy Ortho Treatment Note  [facility]     Patient Name: Sam Infante  : 1955  MRN: [id number]  Today's Date: 10/24/2022      Visit Date: 10/24/2022     Subjective Improvement 60-70%  Visits   Visits approved med nec  RTMD 2022  Recert Date 2022    Visit Dx:    ICD-10-CM ICD-9-CM   1. Primary osteoarthritis of right knee  M17.11 715.16   2. Impaired functional mobility, balance, gait, and endurance  Z74.09 V49.89   3. S/P TKR (total knee replacement), right  Z96.651 V43.65       Patient Active Problem List   Diagnosis   • Borderline glaucoma, open angle with borderline findings   • Borderline glaucoma   • Left hand pain   • Bilateral carpal tunnel syndrome   • Right hip pain   • Primary osteoarthritis of right hip   • Essential hypertension   • Attention deficit hyperactivity disorder   • Benign prostatic hyperplasia   • Fatigue   • Gastroesophageal reflux disease without esophagitis   • Hyperlipidemia   • Mixed anxiety and depressive disorder   • BERNADETTE (obstructive sleep apnea)   • Routine physical examination   • Hx of total hip arthroplasty, right   • Right shoulder pain   • Rotator cuff impingement syndrome of right shoulder   • Impingement syndrome of right shoulder   • Presence of right hip implant   • Primary osteoarthritis of both knees   • Primary osteoarthritis of right knee   • Chronic pain of right knee   • S/P TKR (total knee replacement), right        Past Medical History:   Diagnosis Date   • Arthritis    • Elevated cholesterol    • GERD (gastroesophageal reflux disease)     OCCASIONAL   • High blood pressure    • Kidney stone    • Sleep apnea     using c-pap   • Vitreous floater 2013        Past Surgical History:   Procedure Laterality Date   • COLONOSCOPY     • SEPTOPLASTY  2013    Nasal surgery procedure (Nasal septoplasty.)   • TOTAL HIP ARTHROPLASTY Right 10/14/2019    Procedure: TOTAL HIP ARTHROPLASTY ANTERIOR;  Surgeon:  "Nathaniel Kate MD;  Location: [facility];  Service: Orthopedics   • TOTAL KNEE ARTHROPLASTY Right 9/19/2022    Procedure: RIGHT TOTAL KNEE ARTHROPLASTY WITH ADDUCTOR CANAL BLOCK;  Surgeon: Nathaniel Kate MD;  Location: [facility];  Service: Orthopedics;  Laterality: Right;        PT Ortho     Row Name 10/24/22 0900       Precautions and Contraindications    Precautions R TKA 9-  -CP    Contraindications 5 weeks post op  -CP       Subjective Pain    Able to rate subjective pain? yes  -CP    Pre-Treatment Pain Level 3  -CP       Right Lower Ext    Rt Knee Extension/Flexion AROM 0-119  -CP          User Key  (r) = Recorded By, (t) = Taken By, (c) = Cosigned By    Initials Name Provider Type    Adwoa Kennedy, JANESSA Physical Therapist Assistant                             PT Assessment/Plan     Row Name 10/24/22 1024          PT Assessment    Assessment Comments Patient is progressing very well and should be ready for D/C at end of next week.  -CP        PT Plan    PT Frequency 2x/week;3x/week  -CP     Predicted Duration of Therapy Intervention (PT) 4 weeks  -CP     PT Plan Comments Cont with POC.  -CP           User Key  (r) = Recorded By, (t) = Taken By, (c) = Cosigned By    Initials Name Provider Type    Adwoa Kennedy, JANESSA Physical Therapist Assistant                   OP Exercises     Row Name 10/24/22 1027 10/24/22 0900          Subjective Pain    Able to rate subjective pain? -- yes  -CP     Pre-Treatment Pain Level -- 3  -CP     Post-Treatment Pain Level -- 0  -CP        Total Minutes    21763 - PT Therapeutic Exercise Minutes 35  -CP --     57857 - PT Therapeutic Activity Minutes 10  -CP --        Exercise 1    Exercise Name 1 -- Pro II level 4  -CP     Time 1 -- 10  -CP        Exercise 2    Exercise Name 2 -- incline strtch  -CP     Cueing 2 -- Verbal  -CP     Sets 2 -- 3  -CP     Time 2 -- 30\"  -CP        Exercise 3    Exercise Name 3 -- step up and over BOSU  -CP     Cueing 3 " "-- Verbal;Demo  -CP     Sets 3 -- 2  -CP     Reps 3 -- 10  -CP        Exercise 4    Exercise Name 4 -- step up BOSU  -CP     Cueing 4 -- Verbal;Demo  -CP     Sets 4 -- 2  -CP     Reps 4 -- 10  -CP     Time 4 -- pause  -CP        Exercise 5    Exercise Name 5 -- cybex leg press  -CP     Cueing 5 -- Verbal;Tactile  -CP     Sets 5 -- 3  -CP     Reps 5 -- 10  -CP     Time 5 -- 90 lb  -CP        Exercise 6    Exercise Name 6 -- sit to stands  -CP     Cueing 6 -- Verbal;Demo  -CP     Sets 6 -- 2  -CP     Reps 6 -- 5  -CP     Additional Comments -- independent  -CP        Exercise 7    Exercise Name 7 -- heelsldes with strap  -CP     Cueing 7 -- Verbal;Tactile  -CP     Reps 7 -- 20  -CP        Exercise 8    Exercise Name 8 -- scar massage  -CP     Cueing 8 -- Verbal;Tactile  -CP     Time 8 -- 3  -CP        Exercise 9    Exercise Name 9 -- QS with over pressure  -CP     Cueing 9 -- Verbal;Tactile  -CP     Sets 9 -- 1  -CP     Reps 9 -- 10  -CP     Time 9 -- 10\" holds  -CP           User Key  (r) = Recorded By, (t) = Taken By, (c) = Cosigned By    Initials Name Provider Type    Adwoa Kennedy, PTA Physical Therapist Assistant                              PT OP Goals     Row Name 10/24/22 1000          PT Short Term Goals    STG Date to Achieve --  further STGs deferred  -CP     STG 1 Note a >/= 50% subjective improvement.  -CP     STG 1 Progress Met  -CP     STG 2 LEFS score to be >/= 40/80.  -CP     STG 2 Progress Met  -CP     STG 3 R knee active extension to 0 deg.  -CP     STG 3 Progress Met  -CP     STG 4 R knee active flexion to be >/= 100 deg.  -CP     STG 4 Progress Met  -CP     STG 5 Demonstrate ability to ambulate household distances or greater with cane.  -CP     STG 5 Progress Met  -CP        Long Term Goals    LTG Date to Achieve 11/16/22  -CP     LTG 1 Independent with HEP/self-management.  -CP     LTG 1 Progress Ongoing  -CP     LTG 2 LEFS score to be >/= 60/80.  -CP     LTG 2 Progress Ongoing  -CP     " LTG 3 R knee active flexion to be >/= 120 deg.  -CP     LTG 3 Progress Ongoing  -CP     LTG 4 Demonstrate ability to ambulate community distances on firm, level surfaces without assistive device.  -CP     LTG 4 Progress Met  -CP     LTG 5 Demonstrate ability to ascend 1 flight of stairs step-over-step with HR use PRN.  -CP     LTG 5 Progress Met  -CP        Time Calculation    PT Goal Re-Cert Due Date 11/16/22  -CP           User Key  (r) = Recorded By, (t) = Taken By, (c) = Cosigned By    Initials Name Provider Type    CP Adwoa May PTA Physical Therapist Assistant                               Time Calculation:   Start Time: 0845  Stop Time: 0930  Time Calculation (min): 45 min  Total Timed Code Minutes- PT: 45 minute(s)  Timed Charges  06974 - PT Therapeutic Exercise Minutes: 35  45957 - PT Therapeutic Activity Minutes: 10  Total Minutes  Timed Charges Total Minutes: 45   Total Minutes: 45  Therapy Charges for Today     Code Description Service Date Service Provider Modifiers Qty    31300547783 HC PT THER PROC EA 15 MIN 10/24/2022 Adwao May PTA GP, CQ 2    05275706695 HC PT THERAPEUTIC ACT EA 15 MIN 10/24/2022 Adwoa May PTA GP, CQ 1                    Adwoa May PTA  10/24/2022

## 2022-10-26 ENCOUNTER — HOSPITAL ENCOUNTER (OUTPATIENT)
Dept: PHYSICAL THERAPY | Facility: HOSPITAL | Age: 67
Setting detail: THERAPIES SERIES
Discharge: HOME | End: 2022-10-26

## 2022-10-26 DIAGNOSIS — Z74.09 IMPAIRED FUNCTIONAL MOBILITY, BALANCE, GAIT, AND ENDURANCE: ICD-10-CM

## 2022-10-26 DIAGNOSIS — Z96.651 S/P TKR (TOTAL KNEE REPLACEMENT), RIGHT: ICD-10-CM

## 2022-10-26 DIAGNOSIS — M17.11 PRIMARY OSTEOARTHRITIS OF RIGHT KNEE: Primary | ICD-10-CM

## 2022-10-26 PROCEDURE — 97110 THERAPEUTIC EXERCISES: CPT

## 2022-10-26 NOTE — THERAPY TREATMENT NOTE
Outpatient Physical Therapy Ortho Treatment Note  [facility]     Patient Name: Sam Infante  : 1955  MRN: [id number]  Today's Date: 10/26/2022      Visit Date: 10/26/2022     Subjective Improvement 80%  Visits   Visits approved med [facility]  RTMD 2022  Recert Date 2022    R TKA 2022    Visit Dx:    ICD-10-CM ICD-9-CM   1. Primary osteoarthritis of right knee  M17.11 715.16   2. Impaired functional mobility, balance, gait, and endurance  Z74.09 V49.89   3. S/P TKR (total knee replacement), right  Z96.651 V43.65       Patient Active Problem List   Diagnosis   • Borderline glaucoma, open angle with borderline findings   • Borderline glaucoma   • Left hand pain   • Bilateral carpal tunnel syndrome   • Right hip pain   • Primary osteoarthritis of right hip   • Essential hypertension   • Attention deficit hyperactivity disorder   • Benign prostatic hyperplasia   • Fatigue   • Gastroesophageal reflux disease without esophagitis   • Hyperlipidemia   • Mixed anxiety and depressive disorder   • BERNADETTE (obstructive sleep apnea)   • Routine physical examination   • Hx of total hip arthroplasty, right   • Right shoulder pain   • Rotator cuff impingement syndrome of right shoulder   • Impingement syndrome of right shoulder   • Presence of right hip implant   • Primary osteoarthritis of both knees   • Primary osteoarthritis of right knee   • Chronic pain of right knee   • S/P TKR (total knee replacement), right        Past Medical History:   Diagnosis Date   • Arthritis    • Elevated cholesterol    • GERD (gastroesophageal reflux disease)     OCCASIONAL   • High blood pressure    • Kidney stone    • Sleep apnea     using c-pap   • Vitreous floater 2013        Past Surgical History:   Procedure Laterality Date   • COLONOSCOPY     • SEPTOPLASTY  2013    Nasal surgery procedure (Nasal septoplasty.)   • TOTAL HIP ARTHROPLASTY Right 10/14/2019    Procedure: TOTAL HIP ARTHROPLASTY  ANTERIOR;  Surgeon: Nathaniel Kate MD;  Location: [facility];  Service: Orthopedics   • TOTAL KNEE ARTHROPLASTY Right 9/19/2022    Procedure: RIGHT TOTAL KNEE ARTHROPLASTY WITH ADDUCTOR CANAL BLOCK;  Surgeon: Nathaniel Kate MD;  Location: [facility];  Service: Orthopedics;  Laterality: Right;        PT Ortho     Row Name 10/26/22 0900       Subjective Pain    Post-Treatment Pain Level 0  -CP    Row Name 10/26/22 0800       Precautions and Contraindications    Precautions R TKA 9-  -CP    Contraindications 5 wks 2 days post op  -CP       Right Lower Ext    Rt Knee Extension/Flexion AROM 0-122  after therapy  -CP          User Key  (r) = Recorded By, (t) = Taken By, (c) = Cosigned By    Initials Name Provider Type    Adwoa Kennedy, JANESSA Physical Therapist Assistant                             PT Assessment/Plan     Row Name 10/26/22 0939          PT Assessment    Assessment Comments Antonio has progressed very well with 2 new goals met.  He did present with R SI ant rotation which was limiting him with ther ex for right knee this date.  His R SI ant rotation was corrected using ME and patient reported decrease low back pain  -CP        PT Plan    PT Frequency 2x/week;3x/week  -CP     Predicted Duration of Therapy Intervention (PT) 4 weeks  -CP     PT Plan Comments Cont with POC.  D/C at end of next week  -CP           User Key  (r) = Recorded By, (t) = Taken By, (c) = Cosigned By    Initials Name Provider Type    Adwoa Kennedy PTA Physical Therapist Assistant                   OP Exercises     Row Name 10/26/22 0941 10/26/22 0900 10/26/22 0800       Subjective Comments    Subjective Comments -- Was able to up up and down stairs this morning without any pain he does have a lot of right side low back pain today.  Will talk to Dr. Kate about his back pain when he goes for his knee follow up  -CP --       Subjective Pain    Able to rate subjective pain? -- yes  -CP --    Pre-Treatment  "Pain Level -- 0  -CP --    Post-Treatment Pain Level -- 0  -CP --    Subjective Pain Comment -- back 6/10  -CP --       Total Minutes    85568 - PT Therapeutic Exercise Minutes 50  -CP -- --       Exercise 1    Exercise Name 1 -- -- Pro II level 4  -CP    Time 1 -- -- 10  -CP       Exercise 2    Exercise Name 2 -- -- incline stretch  -CP    Cueing 2 -- -- Verbal  -CP    Sets 2 -- -- 3  -CP    Time 2 -- -- 30\" holds  -CP       Exercise 3    Exercise Name 3 -- -- step up 6\" with opposite hip fl  -CP    Cueing 3 -- -- Verbal;Demo  -CP    Sets 3 -- -- 2  -CP    Reps 3 -- -- 10  -CP       Exercise 4    Exercise Name 4 -- -- SL stance with opposite LE fl and ext swings  -CP    Cueing 4 -- -- Verbal;Demo  -CP    Sets 4 -- -- 2  -CP    Reps 4 -- -- 10  -CP       Exercise 5    Exercise Name 5 -- -- heel toe gait on foam  -CP    Cueing 5 -- -- Verbal;Demo  -CP    Reps 5 -- -- 5  -CP    Time 5 -- -- handrail assist when needed  -CP       Exercise 6    Exercise Name 6 -- -- lat step up 6\"  -CP    Cueing 6 -- -- Verbal;Demo  -CP    Sets 6 -- -- 2  -CP    Reps 6 -- -- 10  -CP       Exercise 7    Exercise Name 7 -- -- hurdles forward  -CP    Cueing 7 -- -- Verbal;Demo  -CP    Reps 7 -- -- 5  -CP    Time 7 -- -- handrail assist when needed  -CP       Exercise 8    Exercise Name 8 -- -- cybex hip AB  -CP    Cueing 8 -- -- Verbal;Tactile  -CP    Sets 8 -- -- 3  -CP    Reps 8 -- -- 10  -CP    Time 8 -- -- 30 lb  -CP       Exercise 9    Exercise Name 9 -- -- LTR  -CP    Cueing 9 -- -- Verbal;Tactile  -CP    Reps 9 -- -- 20  -CP       Exercise 10    Exercise Name 10 -- -- bridging  -CP    Cueing 10 -- -- Verbal;Tactile  -CP    Sets 10 -- -- 2  -CP    Reps 10 -- -- 10  -CP       Exercise 11    Exercise Name 11 -- -- see manual  -CP       Exercise 12    Exercise Name 12 -- -- heelslides with strap  -CP    Cueing 12 -- -- Tactile;Verbal  -CP    Time 12 -- -- 20  -CP          User Key  (r) = Recorded By, (t) = Taken By, (c) = Cosigned By    " Initials Name Provider Type    CP Adwoa May PTA Physical Therapist Assistant                         Manual Rx (last 36 hours)     Manual Treatments     Row Name 10/26/22 0900             Manual Rx 1    Manual Rx 1 Location R SI  -CP      Manual Rx 1 Type ME to correct right ant rotation  -CP            User Key  (r) = Recorded By, (t) = Taken By, (c) = Cosigned By    Initials Name Provider Type    CP Adwoa May PTA Physical Therapist Assistant                 PT OP Goals     Row Name 10/26/22 0800          PT Short Term Goals    STG Date to Achieve --  further STGs deferred  -CP     STG 1 Note a >/= 50% subjective improvement.  -CP     STG 1 Progress Met  -CP     STG 2 LEFS score to be >/= 40/80.  -CP     STG 2 Progress Met  -CP     STG 3 R knee active extension to 0 deg.  -CP     STG 3 Progress Met  -CP     STG 4 R knee active flexion to be >/= 100 deg.  -CP     STG 4 Progress Met  -CP     STG 5 Demonstrate ability to ambulate household distances or greater with cane.  -CP     STG 5 Progress Met  -CP        Long Term Goals    LTG Date to Achieve 11/16/22  -CP     LTG 1 Independent with HEP/self-management.  -CP     LTG 1 Progress Ongoing  -CP     LTG 2 LEFS score to be >/= 60/80.  -CP     LTG 2 Progress Met  -CP     LTG 3 R knee active flexion to be >/= 120 deg.  -CP     LTG 3 Progress Met  -CP     LTG 4 Demonstrate ability to ambulate community distances on firm, level surfaces without assistive device.  -CP     LTG 4 Progress Met  -CP     LTG 5 Demonstrate ability to ascend 1 flight of stairs step-over-step with HR use PRN.  -CP     LTG 5 Progress Met  -CP        Time Calculation    PT Goal Re-Cert Due Date 11/16/22  -CP           User Key  (r) = Recorded By, (t) = Taken By, (c) = Cosigned By    Initials Name Provider Type    CP Adwoa May PTA Physical Therapist Assistant                     Outcome Measure Options: Lower Extremity Functional Scale (LEFS)  Lower Extremity Functional  Index  Any of your usual work, housework or school activities: A little bit of difficulty  Your usual hobbies, recreational or sporting activities: A little bit of difficulty  Getting into or out of the bath: A little bit of difficulty  Walking between rooms: No difficulty  Putting on your shoes or socks: No difficulty  Squatting: A little bit of difficulty  Lifting an object, like a bag of groceries from the floor: No difficulty  Performing light activities around your home: No difficulty  Performing heavy activities around your home: A little bit of difficulty  Getting into or out of a car: A little bit of difficulty  Walking 2 blocks: No difficulty  Walking a mile: A little bit of difficulty  Going up or down 10 stairs (about 1 flight of stairs): A little bit of difficulty  Standing for 1 hour: A little bit of difficulty  Sitting for 1 hour: No difficulty  Running on even ground: Moderate difficulty  Running on uneven ground: Quite a bit of difficulty  Making sharp turns while running fast: Quite a bit of difficulty  Hopping: Quite a bit of difficulty  Rolling over in bed: No difficulty  Total: 60      Time Calculation:   Start Time: 0840  Stop Time: 0930  Time Calculation (min): 50 min  Total Timed Code Minutes- PT: 50 minute(s)  Timed Charges  00495 - PT Therapeutic Exercise Minutes: 50  Total Minutes  Timed Charges Total Minutes: 50   Total Minutes: 50  Therapy Charges for Today     Code Description Service Date Service Provider Modifiers Qty    34158040600 HC PT THER PROC EA 15 MIN 10/26/2022 Adwoa May PTA GP, CQ 3          PT G-Codes  Outcome Measure Options: Lower Extremity Functional Scale (LEFS)  Total: 60         Adwoa May PTA  10/26/2022

## 2022-10-28 ENCOUNTER — HOSPITAL ENCOUNTER (OUTPATIENT)
Dept: PHYSICAL THERAPY | Facility: HOSPITAL | Age: 67
Setting detail: THERAPIES SERIES
Discharge: HOME | End: 2022-10-28

## 2022-10-28 DIAGNOSIS — M17.11 PRIMARY OSTEOARTHRITIS OF RIGHT KNEE: Primary | ICD-10-CM

## 2022-10-28 DIAGNOSIS — Z96.651 S/P TKR (TOTAL KNEE REPLACEMENT), RIGHT: ICD-10-CM

## 2022-10-28 DIAGNOSIS — Z74.09 IMPAIRED FUNCTIONAL MOBILITY, BALANCE, GAIT, AND ENDURANCE: ICD-10-CM

## 2022-10-28 PROCEDURE — 97110 THERAPEUTIC EXERCISES: CPT

## 2022-10-28 NOTE — THERAPY TREATMENT NOTE
Outpatient Physical Therapy Ortho Treatment Note  [facility]     Patient Name: Sam Infante  : 1955  MRN: [id number]  Today's Date: 10/28/2022      Visit Date: 10/28/2022     Subjective Improvement 85%  Visits 15/15  Visits approved med nec  RTMD 2022  Recert Date 2022    R TKA on 2022    Visit Dx:    ICD-10-CM ICD-9-CM   1. Primary osteoarthritis of right knee  M17.11 715.16   2. Impaired functional mobility, balance, gait, and endurance  Z74.09 V49.89   3. S/P TKR (total knee replacement), right  Z96.651 V43.65       Patient Active Problem List   Diagnosis   • Borderline glaucoma, open angle with borderline findings   • Borderline glaucoma   • Left hand pain   • Bilateral carpal tunnel syndrome   • Right hip pain   • Primary osteoarthritis of right hip   • Essential hypertension   • Attention deficit hyperactivity disorder   • Benign prostatic hyperplasia   • Fatigue   • Gastroesophageal reflux disease without esophagitis   • Hyperlipidemia   • Mixed anxiety and depressive disorder   • BERNADETTE (obstructive sleep apnea)   • Routine physical examination   • Hx of total hip arthroplasty, right   • Right shoulder pain   • Rotator cuff impingement syndrome of right shoulder   • Impingement syndrome of right shoulder   • Presence of right hip implant   • Primary osteoarthritis of both knees   • Primary osteoarthritis of right knee   • Chronic pain of right knee   • S/P TKR (total knee replacement), right        Past Medical History:   Diagnosis Date   • Arthritis    • Elevated cholesterol    • GERD (gastroesophageal reflux disease)     OCCASIONAL   • High blood pressure    • Kidney stone    • Sleep apnea     using c-pap   • Vitreous floater 2013        Past Surgical History:   Procedure Laterality Date   • COLONOSCOPY     • SEPTOPLASTY  2013    Nasal surgery procedure (Nasal septoplasty.)   • TOTAL HIP ARTHROPLASTY Right 10/14/2019    Procedure: TOTAL HIP ARTHROPLASTY  ANTERIOR;  Surgeon: Nathaniel Kate MD;  Location: [facility];  Service: Orthopedics   • TOTAL KNEE ARTHROPLASTY Right 9/19/2022    Procedure: RIGHT TOTAL KNEE ARTHROPLASTY WITH ADDUCTOR CANAL BLOCK;  Surgeon: Nathaniel Kate MD;  Location: [facility] OR;  Service: Orthopedics;  Laterality: Right;        PT Ortho     Row Name 10/28/22 0800       Subjective Comments    Subjective Comments Patient states that he is 80-85% better.  He reports pain with stairs buts that is all.His back is feeling better.  -CP       Precautions and Contraindications    Precautions R TKA 9-  -CP       Subjective Pain    Able to rate subjective pain? yes  -CP    Pre-Treatment Pain Level 0  -CP          User Key  (r) = Recorded By, (t) = Taken By, (c) = Cosigned By    Initials Name Provider Type    Adwoa Kennedy, JANESSA Physical Therapist Assistant                             PT Assessment/Plan     Row Name 10/28/22 0854          PT Assessment    Assessment Comments Patient is progressing very welll.  He will be ready for D/C at end next week.  He was instructed in self SI correction and encourage to see his MD if his back pain conts.  -CP        PT Plan    PT Frequency 3x/week;2x/week  -CP     Predicted Duration of Therapy Intervention (PT) 4 weeks  -CP     PT Plan Comments Cont withPOC.  MD note next visit  -CP           User Key  (r) = Recorded By, (t) = Taken By, (c) = Cosigned By    Initials Name Provider Type    Adwoa Kennedy PTA Physical Therapist Assistant                   OP Exercises     Row Name 10/28/22 0855 10/28/22 0800          Subjective Comments    Subjective Comments -- Patient states that he is 80-85% better.  He reports pain with stairs buts that is all.His back is feeling better.  -CP        Subjective Pain    Able to rate subjective pain? -- yes  -CP     Pre-Treatment Pain Level -- 0  -CP     Post-Treatment Pain Level -- 0  -CP        Total Minutes    74214 - PT Therapeutic Exercise  "Minutes 43  -CP --        Exercise 1    Exercise Name 1 -- Pro II level 4  -CP     Time 1 -- 10  -CP        Exercise 2    Exercise Name 2 -- inclinse Stretch  -CP     Cueing 2 -- Verbal;Demo  -CP     Sets 2 -- 3  -CP     Time 2 -- 30\" holds  -CP        Exercise 3    Exercise Name 3 -- standing HS stretch  -CP     Cueing 3 -- Verbal;Demo  -CP     Sets 3 -- 3  -CP     Time 3 -- 30\" holds  -CP     Additional Comments -- B LE  -CP        Exercise 4    Exercise Name 4 -- step up with opposite hip ext  -CP     Cueing 4 -- Verbal;Demo  -CP     Sets 4 -- 2  -CP     Reps 4 -- 10  -CP     Time 4 -- B LE  -CP        Exercise 5    Exercise Name 5 -- step down 4\"  -CP     Cueing 5 -- Verbal;Demo  -CP     Sets 5 -- 2  -CP     Reps 5 -- 10  -CP        Exercise 6    Exercise Name 6 -- ramp walk  -CP     Cueing 6 -- Verbal;Demo  -CP     Reps 6 -- 5 each  -CP     Time 6 -- Forward and backward  -CP        Exercise 7    Exercise Name 7 -- bridging  -CP     Cueing 7 -- Verbal;Tactile  -CP     Reps 7 -- 20  -CP     Time 7 -- 3-5\" holds  -CP        Exercise 8    Exercise Name 8 -- instruct in slef SI correction  -CP        Exercise 9    Exercise Name 9 -- heelslides with strap  -CP     Cueing 9 -- Verbal;Tactile  -CP     Reps 9 -- 20  -CP        Exercise 10    Exercise Name 10 -- SLR  -CP     Cueing 10 -- Verbal  -CP     Sets 10 -- 2  -CP     Reps 10 -- 10  -CP     Time 10 -- 5' holds  -CP           User Key  (r) = Recorded By, (t) = Taken By, (c) = Cosigned By    Initials Name Provider Type    CP Adwoa May, PTA Physical Therapist Assistant                              PT OP Goals     Row Name 10/28/22 0800          PT Short Term Goals    STG Date to Achieve --  further STGs deferred  -CP     STG 1 Note a >/= 50% subjective improvement.  -CP     STG 1 Progress Met  -CP     STG 2 LEFS score to be >/= 40/80.  -CP     STG 2 Progress Met  -CP     STG 3 R knee active extension to 0 deg.  -CP     STG 3 Progress Met  -CP     STG 4 R " "knee active flexion to be >/= 100 deg.  -CP     STG 4 Progress Met  -CP     STG 5 Demonstrate ability to ambulate household distances or greater with cane.  -CP     STG 5 Progress Met  -CP        Long Term Goals    LTG Date to Achieve 11/16/22  -CP     LTG 1 Independent with HEP/self-management.  -CP     LTG 1 Progress Ongoing  -CP     LTG 2 LEFS score to be >/= 60/80.  -CP     LTG 2 Progress Met  -CP     LTG 3 R knee active flexion to be >/= 120 deg.  -CP     LTG 3 Progress Met  -CP     LTG 4 Demonstrate ability to ambulate community distances on firm, level surfaces without assistive device.  -CP     LTG 4 Progress Met  -CP     LTG 5 Demonstrate ability to ascend 1 flight of stairs step-over-step with HR use PRN.  -CP     LTG 5 Progress Met  -CP        Time Calculation    PT Goal Re-Cert Due Date 11/16/22  -CP           User Key  (r) = Recorded By, (t) = Taken By, (c) = Cosigned By    Initials Name Provider Type    CP Adwoa May PTA Physical Therapist Assistant                Therapy Education  Education Details: instruct in self SI correction, bridging, SLR with 5\" holds  Given: HEP  Program: New  How Provided: Verbal, Demonstration  Provided to: Patient  Level of Understanding: Teach back education performed, Verbalized, Demonstrated              Time Calculation:   Start Time: 0800  Stop Time: 0843  Time Calculation (min): 43 min  Total Timed Code Minutes- PT: 43 minute(s)  Timed Charges  65213 - PT Therapeutic Exercise Minutes: 43  Total Minutes  Timed Charges Total Minutes: 43   Total Minutes: 43  Therapy Charges for Today     Code Description Service Date Service Provider Modifiers Qty    56617561852 HC PT THER PROC EA 15 MIN 10/28/2022 Adwoa May PTA GP, CQ 3                    Adwoa May PTA  10/28/2022     "

## 2022-10-31 ENCOUNTER — HOSPITAL ENCOUNTER (OUTPATIENT)
Dept: PHYSICAL THERAPY | Facility: HOSPITAL | Age: 67
Setting detail: THERAPIES SERIES
Discharge: HOME | End: 2022-10-31

## 2022-10-31 DIAGNOSIS — Z96.651 S/P TKR (TOTAL KNEE REPLACEMENT), RIGHT: ICD-10-CM

## 2022-10-31 DIAGNOSIS — M17.11 PRIMARY OSTEOARTHRITIS OF RIGHT KNEE: Primary | ICD-10-CM

## 2022-10-31 PROCEDURE — 97110 THERAPEUTIC EXERCISES: CPT

## 2022-11-01 ENCOUNTER — OFFICE VISIT (OUTPATIENT)
Dept: ORTHOPEDIC SURGERY | Facility: CLINIC | Age: 67
End: 2022-11-01

## 2022-11-01 VITALS — BODY MASS INDEX: 32.99 KG/M2 | WEIGHT: 205.3 LBS | HEIGHT: 66 IN

## 2022-11-01 DIAGNOSIS — M17.11 PRIMARY OSTEOARTHRITIS OF RIGHT KNEE: Primary | ICD-10-CM

## 2022-11-01 DIAGNOSIS — Z96.651 S/P TKR (TOTAL KNEE REPLACEMENT), RIGHT: ICD-10-CM

## 2022-11-01 PROCEDURE — 99024 POSTOP FOLLOW-UP VISIT: CPT | Performed by: ORTHOPAEDIC SURGERY

## 2022-11-01 NOTE — PROGRESS NOTES
Sam Infante is a 67 y.o. male is s/p  Right Total Knee Arthroplasty      Chief Complaint   Patient presents with   • Right Knee - Follow-up       HISTORY OF PRESENT ILLNESS: Patient is here today s/p Right TKA on 9/19/2022.   Better than before surgery  Some soreness still.  No fever or chills         Allergies   Allergen Reactions   • Statins Other (See Comments)     Muscle pain         Current Outpatient Medications:   •  ferrous sulfate 324 (65 Fe) MG tablet delayed-release EC tablet, Take 1 tablet by mouth Daily With Breakfast., Disp: 30 tablet, Rfl: 0  •  latanoprost (XALATAN) 0.005 % ophthalmic solution, Administer 1 drop to both eyes Every Night., Disp: 2.5 mL, Rfl: 12  •  metoprolol tartrate (LOPRESSOR) 50 MG tablet, Take 50 mg by mouth 2 (Two) Times a Day., Disp: , Rfl:   •  Multiple Vitamins-Minerals (PRESERVISION AREDS PO), Take 1 tablet by mouth 2 (Two) Times a Day., Disp: , Rfl:   •  naproxen (EC NAPROSYN) 500 MG EC tablet, TAKE 1 TABLET TWICE DAILY WITH MEALS, Disp: 60 tablet, Rfl: 10  •  oxyCODONE-acetaminophen (PERCOCET) 7.5-325 MG per tablet, Take 1 tablet by mouth Every 8 (Eight) Hours As Needed for Severe Pain., Disp: 30 tablet, Rfl: 0  •  verapamil PM (VERELAN PM) 360 MG 24 hr capsule, Take 360 mg by mouth daily., Disp: , Rfl:     No fevers or chills.  No nausea or vomiting.      PHYSICAL EXAMINATION:       Sam Infante is a 67 y.o. male    Patient is awake and alert, answers questions appropriately and is in no apparent distress.    GAIT:     [x]  Normal  []  Antalgic    Assistive device: [x]  None  []  Walker     []  Crutches  []  Cane     []  Wheelchair  []  Stretcher    Ortho Exam     Mild swelling  120 degrees flexion.  0 extension.  Stable exam.  Distal pulses and sensation.          ASSESSMENT:    Diagnoses and all orders for this visit:    Primary osteoarthritis of right knee    S/P TKR (total knee replacement), right          PLAN    Progressing well status post total  knee arthroplasty in the right knee.  Slowly increase activity as tolerated.  Continue general strength and conditioning exercises.  Activity as tolerated.  Follow-up in 8 weeks with repeat x-rays.    Return in about 8 weeks (around 12/27/2022) for Recheck with repeat xrays.    Nathaniel Kate MD

## 2022-11-02 ENCOUNTER — APPOINTMENT (OUTPATIENT)
Dept: PHYSICAL THERAPY | Facility: HOSPITAL | Age: 67
End: 2022-11-02

## 2022-11-04 ENCOUNTER — HOSPITAL ENCOUNTER (OUTPATIENT)
Dept: PHYSICAL THERAPY | Facility: HOSPITAL | Age: 67
Setting detail: THERAPIES SERIES
Discharge: HOME | End: 2022-11-04

## 2022-11-04 DIAGNOSIS — M17.11 PRIMARY OSTEOARTHRITIS OF RIGHT KNEE: Primary | ICD-10-CM

## 2022-11-04 DIAGNOSIS — Z96.651 S/P TKR (TOTAL KNEE REPLACEMENT), RIGHT: ICD-10-CM

## 2022-11-04 PROCEDURE — 97110 THERAPEUTIC EXERCISES: CPT | Performed by: PHYSICAL THERAPIST

## 2022-11-04 NOTE — THERAPY DISCHARGE NOTE
"     Outpatient Physical Therapy Ortho Progress Note/Discharge Summary  [facility]     Patient Name: Sam Infante  : 1955  MRN: [id number]  Today's Date: 2022      Visit Date: 2022    Attendance:  (medical necessity)  Subjective Improvement: 85-90%  Next MD Appt: 22     Therapy Diagnosis: R total knee arthroplasty, 22     Changes in Medications: none   Changes in MD Orders: none noted  Number of Work Days Lost: n/a     Visit Dx:    ICD-10-CM ICD-9-CM   1. Primary osteoarthritis of right knee  M17.11 715.16   2. S/P TKR (total knee replacement), right  Z96.651 V43.65            Past Medical History:   Diagnosis Date   • Arthritis    • Elevated cholesterol    • GERD (gastroesophageal reflux disease)     OCCASIONAL   • High blood pressure    • Kidney stone    • Sleep apnea     using c-pap   • Vitreous floater 2013        Past Surgical History:   Procedure Laterality Date   • COLONOSCOPY     • SEPTOPLASTY  2013    Nasal surgery procedure (Nasal septoplasty.)   • TOTAL HIP ARTHROPLASTY Right 10/14/2019    Procedure: TOTAL HIP ARTHROPLASTY ANTERIOR;  Surgeon: Nathaniel Kate MD;  Location: [facility];  Service: Orthopedics   • TOTAL KNEE ARTHROPLASTY Right 2022    Procedure: RIGHT TOTAL KNEE ARTHROPLASTY WITH ADDUCTOR CANAL BLOCK;  Surgeon: Nathaniel Kate MD;  Location: [facility];  Service: Orthopedics;  Laterality: Right;        PT Ortho     Row Name 22 0900       Subjective Comments    Subjective Comments Knee is doing well. Still sore, but able to go up and down stairs. \"Getting up out of a chair is one of the hardest things, especially if you've been sitting for a while.\" Able to play piano again. 85-90% subjective improvement.  -SS       Precautions and Contraindications    Precautions R TKA 2022  -SS       Subjective Pain    Able to rate subjective pain? yes  -SS    Pre-Treatment Pain Level 2  -SS    Post-Treatment Pain Level " "2  -SS       Posture/Observations    Posture/Observations Comments Ambulating without assistive device. Nonantalgic gait.  -SS       Right Lower Ext    Rt Knee Extension/Flexion AROM 0-120 deg  -SS       MMT Right Lower Ext    Rt Hip Flexion MMT, Gross Movement (5/5) normal  -SS    Rt Hip Extension MMT, Gross Movement (5/5) normal  -SS    Rt Hip ABduction MMT, Gross Movement (5/5) normal  -SS    Rt Hip ADduction MMT, Gross Movement (5/5) normal  -SS    Rt Hip Internal (Medial) Rotation MMT, Gross Movement (4/5) good  pain  -SS    Rt Hip External (Lateral) Rotation MMT, Gross Movement (5/5) normal  -SS    Rt Knee Extension MMT, Gross Movement (5/5) normal  pain  -SS    Rt Knee Flexion MMT, Gross Movement (5/5) normal  pain  -SS    Rt Lower Extremity Comments  Flexion SLR 5/5  -SS       Balance Skills Training    SLS L 54\" ankle strategy; R 1 min (stopped by P.T)  -SS       Gait/Stairs (Locomotion)    [place] Level (Stairs) independent  -SS    Number of Steps (Stairs) 17  -SS    Ascending Technique (Stairs) step-over-step  -SS    Descending Technique (Stairs) step-over-step  -SS    Stairs, Safety Issues --  none  -SS          User Key  (r) = Recorded By, (t) = Taken By, (c) = Cosigned By    Initials Name Provider Type    SS Tang Cole, PT DPT Physical Therapist                             PT Assessment/Plan     Row Name 11/04/22 0900          PT Assessment    Functional Limitations Limitations in functional capacity and performance  -     Impairments Edema  -     Assessment Comments Patient is doing very well. He has been encouraged to continue his HEP and joint a fitness center.  -SS     Rehab Potential Good  -SS     Patient/caregiver participated in establishment of treatment plan and goals Yes  -SS     Patient would benefit from skilled therapy intervention No  -SS        PT Plan    PT Plan Comments D/C P.T. to HEP & self-management.  -SS           User Key  (r) = Recorded By, (t) = Taken By, " "(c) = Cosigned By    Initials Name Provider Type    Tang Kapadia, PT DPT Physical Therapist                     OP Exercises     Row Name 11/04/22 0900             Precautions    Existing Precautions/Restrictions --  R TKA 9-  -SS         Subjective Comments    Subjective Comments Knee is doing well. Still sore, but able to go up and down stairs. \"Getting up out of a chair is one of the hardest things, especially if you've been sitting for a while.\" Able to play piano again. 85-90% subjective improvement.  -SS         Subjective Pain    Able to rate subjective pain? yes  -SS      Pre-Treatment Pain Level 2  -SS      Post-Treatment Pain Level 2  -SS         Total Minutes    14049 - PT Therapeutic Exercise Minutes 38  -SS         Exercise 1    Exercise Name 1 Pro2, Seat 7, Pedal 2, LE, strength/endurance  -SS      Cueing 1 Verbal  -SS      Time 1 10 mins  -SS      Additional Comments Level 4.5, [place]  -SS         Exercise 2    Exercise Name 2 Incline calf stretch  -SS      Cueing 2 Verbal  -SS      Sets 2 3  -SS      Time 2 30 sec hold  -SS         Exercise 3    Exercise Name 3 Lunge stretch for knee flexion  -SS      Cueing 3 Verbal;Demo  -SS      Sets 3 3  -SS      Time 3 30 sec hold  -SS         Exercise 4    Exercise Name 4 Reciprocal stairs  -SS      Cueing 4 Verbal  -SS      Sets 4 1  -SS      Reps 4 17 stairs  -SS      Additional Comments no handrail use  -SS         Exercise 5    Exercise Name 5 review self-management  -            User Key  (r) = Recorded By, (t) = Taken By, (c) = Cosigned By    Initials Name Provider Type    SS Tang Cole, PT DPT Physical Therapist                                PT OP Goals     Row Name 11/04/22 0900          PT Short Term Goals    STG Date to Achieve --  further STGs deferred  -SS     STG 1 Note a >/= 50% subjective improvement.  -SS     STG 1 Progress Met  -SS     STG 2 LEFS score to be >/= 40/80.  -SS     STG 2 Progress Met  " -     STG 3 R knee active extension to 0 deg.  -     STG 3 Progress Met  -     STG 4 R knee active flexion to be >/= 100 deg.  -     STG 4 Progress Met  -     STG 5 Demonstrate ability to ambulate household distances or greater with cane.  -     STG 5 Progress Met  -        Long Term Goals    LTG Date to Achieve 11/16/22  -     LTG 1 Independent with HEP/self-management.  -     LTG 1 Progress Met  -     LTG 2 LEFS score to be >/= 60/80.  -     LTG 2 Progress Not Met  -     LTG 2 Progress Comments met previously  -     LTG 3 R knee active flexion to be >/= 120 deg.  -     LTG 3 Progress Met  -     LTG 4 Demonstrate ability to ambulate community distances on firm, level surfaces without assistive device.  -     LTG 4 Progress Met  -     LTG 5 Demonstrate ability to ascend 1 flight of stairs step-over-step with HR use PRN.  -     LTG 5 Progress Met  -        Time Calculation    PT Goal Re-Cert Due Date 11/16/22  -           User Key  (r) = Recorded By, (t) = Taken By, (c) = Cosigned By    Initials Name Provider Type    Tang Kapadia, PT DPT Physical Therapist                Therapy Education  Education Details: self-management  Given: HEP  Program: Reinforced  How Provided: Verbal  Provided to: Patient  Level of Understanding: Verbalized    Outcome Measure Options: Lower Extremity Functional Scale (LEFS)  Lower Extremity Functional Index  Any of your usual work, housework or school activities: A little bit of difficulty  Your usual hobbies, recreational or sporting activities: Moderate difficulty  Getting into or out of the bath: A little bit of difficulty  Walking between rooms: No difficulty  Putting on your shoes or socks: Moderate difficulty  Squatting: A little bit of difficulty  Lifting an object, like a bag of groceries from the floor: No difficulty  Performing light activities around your home: No difficulty  Performing heavy activities around your home:  Moderate difficulty  Getting into or out of a car: Moderate difficulty  Walking 2 blocks: A little bit of difficulty  Walking a mile: Moderate difficulty  Going up or down 10 stairs (about 1 flight of stairs): Moderate difficulty  Standing for 1 hour: Moderate difficulty  Sitting for 1 hour: No difficulty  Running on even ground: Moderate difficulty  Running on uneven ground: Quite a bit of difficulty  Making sharp turns while running fast: Quite a bit of difficulty  Hopping: Quite a bit of difficulty  Rolling over in bed: No difficulty  Total: 51      Time Calculation:   Start Time: 0927  Stop Time: 1005  Time Calculation (min): 38 min  Total Timed Code Minutes- PT: 38 minute(s)  Timed Charges  23718 - PT Therapeutic Exercise Minutes: 38  Total Minutes  Timed Charges Total Minutes: 38   Total Minutes: 38  Therapy Charges for Today     Code Description Service Date Service Provider Modifiers Qty    56730635342 HC PT THER PROC EA 15 MIN 11/4/2022 Tang Cole, PT DPT GP 3               OP PT Discharge Summary  Date of Discharge: 11/04/22  Reason for Discharge: Independent  Outcomes Achieved: Patient able to partially achieve established goals (Achieved 5 of 5 STGs and 4 of 5 LTGs.)  Discharge Destination: Home with home program      Tang Cole, PT, DPT, CHT  11/4/2022

## 2022-11-07 ENCOUNTER — APPOINTMENT (OUTPATIENT)
Dept: PHYSICAL THERAPY | Facility: HOSPITAL | Age: 67
End: 2022-11-07

## 2022-11-09 ENCOUNTER — APPOINTMENT (OUTPATIENT)
Dept: PHYSICAL THERAPY | Facility: HOSPITAL | Age: 67
End: 2022-11-09

## 2022-11-11 ENCOUNTER — APPOINTMENT (OUTPATIENT)
Dept: PHYSICAL THERAPY | Facility: HOSPITAL | Age: 67
End: 2022-11-11

## 2022-11-14 ENCOUNTER — APPOINTMENT (OUTPATIENT)
Dept: PHYSICAL THERAPY | Facility: HOSPITAL | Age: 67
End: 2022-11-14

## 2022-11-16 ENCOUNTER — APPOINTMENT (OUTPATIENT)
Dept: PHYSICAL THERAPY | Facility: HOSPITAL | Age: 67
End: 2022-11-16

## 2022-11-18 ENCOUNTER — APPOINTMENT (OUTPATIENT)
Dept: PHYSICAL THERAPY | Facility: HOSPITAL | Age: 67
End: 2022-11-18

## 2022-12-19 DIAGNOSIS — Z96.651 S/P TKR (TOTAL KNEE REPLACEMENT), RIGHT: Primary | ICD-10-CM

## 2022-12-27 ENCOUNTER — OFFICE VISIT (OUTPATIENT)
Dept: ORTHOPEDIC SURGERY | Facility: CLINIC | Age: 67
End: 2022-12-27

## 2022-12-27 VITALS — BODY MASS INDEX: 32.47 KG/M2 | WEIGHT: 202 LBS | HEIGHT: 66 IN

## 2022-12-27 DIAGNOSIS — Z96.651 PRESENCE OF ARTIFICIAL KNEE JOINT, RIGHT: Primary | ICD-10-CM

## 2022-12-27 DIAGNOSIS — M19.049 HAND ARTHRITIS: ICD-10-CM
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
## 2022-12-27 DIAGNOSIS — G47.33 OSA (OBSTRUCTIVE SLEEP APNEA): ICD-10-CM

## 2022-12-27 DIAGNOSIS — M19.041 ARTHRITIS OF RIGHT HAND: ICD-10-CM

## 2022-12-27 DIAGNOSIS — I10 ESSENTIAL HYPERTENSION: ICD-10-CM

## 2022-12-27 PROCEDURE — 99214 OFFICE O/P EST MOD 30 MIN: CPT | Performed by: ORTHOPAEDIC SURGERY

## 2022-12-27 RX ORDER — MELOXICAM 15 MG/1
TABLET ORAL
Qty: 30 TABLET | Refills: 3 | Status: SHIPPED | OUTPATIENT
Start: 2022-12-27

## 2022-12-27 RX ORDER — VALSARTAN AND HYDROCHLOROTHIAZIDE 160; 12.5 MG/1; MG/1
1 TABLET, FILM COATED ORAL DAILY
COMMUNITY
Start: 2022-11-11 | End: 2023-11-12

## 2022-12-27 NOTE — PROGRESS NOTES
"Sam Infante is a 67 y.o. male returns for     Chief Complaint   Patient presents with   • Right Knee - Follow-up       HISTORY OF PRESENT ILLNESS:    F/u right total knee, xrays done today.   No fevers or chills  Pain is much better  He is mobilizing well.  He continues to progress with activity.     CONCURRENT MEDICAL HISTORY:    The following portions of the patient's history were reviewed and updated as appropriate: allergies, current medications, past family history, past medical history, past social history, past surgical history and problem list.     ROS  No fevers or chills.  No chest pain or shortness of air.  No GI or  disturbances.    PHYSICAL EXAMINATION:       Ht 167.6 cm (66\")   Wt 91.6 kg (202 lb)   BMI 32.60 kg/m²     Physical Exam  Constitutional:       General: He is not in acute distress.     Appearance: Normal appearance.   Pulmonary:      Effort: Pulmonary effort is normal. No respiratory distress.   Neurological:      Mental Status: He is alert and oriented to person, place, and time.         GAIT:     [x]  Normal  []  Antalgic    Assistive device: [x]  None  []  Walker     []  Crutches  []  Cane     []  Wheelchair  []  Stretcher    Right Knee Exam     Comments:  Good range of motion.  Full extension.  Flexion to approximately 120 degrees.  Good stability with varus valgus testing.  Incision is well-healed.  Good distal pulses and sensation.                  XR Knee 1 or 2 View Right    Result Date: 12/27/2022  Narrative: Ordering Provider:  Nathaniel Kate MD Ordering Diagnosis/Indication:  S/P TKR (total knee replacement), right Procedure:  XR KNEE 1 OR 2 VW RIGHT Exam Date:  12/27/22 COMPARISON:  Todays X-rays were compared to previous images dated 10/4/2022.     Impression:  AP bilateral standing of the knees with lateral of the right knee show acceptable position and alignment of a right total knee arthroplasty.  No sign of implant loosening or failure is noted.  Left " knee shows moderate arthritic changes with medial joint space narrowing and marginal osteophytes.  No acute findings.  No interval change in comparison to prior x-ray. Nathaniel Kate MD 12/27/22     XR Knee Bilateral AP Standing    Result Date: 12/27/2022  Narrative: Ordering Provider:  Nathaniel Kate MD Ordering Diagnosis/Indication:  S/P TKR (total knee replacement), right Procedure:  XR KNEE BILATERAL AP STANDING Exam Date:  12/27/22 COMPARISON:  Todays X-rays were compared to previous images dated 10/4/2022.     Impression:  AP bilateral standing of the knees with lateral of the right knee show acceptable position and alignment of a right total knee arthroplasty.  No sign of implant loosening or failure is noted.  Left knee shows moderate arthritic changes with medial joint space narrowing and marginal osteophytes.  No acute findings.  No interval change in comparison to prior x-ray. Nathaniel Kate MD 12/27/22     XR Foot 3+ View Right    Result Date: 12/1/2022  Narrative: Right foot pain 3 views right foot: The foot is normally aligned and no fracture or periostitis . There is moderate to pronounced degenerative change at the first MTP joint and mild degenerative change at the second MTP joint . The other joints are unremarkable    Impression:   Marked degenerative change first MTP joint [id number]            ASSESSMENT:    Diagnoses and all orders for this visit:    Presence of artificial knee joint, right    Hand arthritis  -     Ambulatory Referral to Hand Surgery    Piano playing  -     Ambulatory Referral to Hand Surgery    Arthritis of right hand    BERNADETTE (obstructive sleep apnea)    Essential hypertension    Other orders  -     valsartan-hydrochlorothiazide (DIOVAN-HCT) 160-12.5 MG per tablet; Take 1 tablet by mouth Daily.  -     meloxicam (MOBIC) 15 MG tablet; 1 PO Daily with food.          PLAN    He will continue with general strength and conditioning exercises for his  legs.  He will continue to progress activity as tolerated.  No true restrictions.  In regards to his knee, he will follow-up 1 year postoperatively.    He has been complaining of pain in his hands and especially his right hand.  He plays the piano and has difficulty with motion and activity.  He wishes to discuss further treatment options for hand arthritis.  We discussed him seeing a hand specialist for further evaluation.    He will start taking meloxicam for anti-inflammatory usage.    Hand OA - referral to Dr Awad in [place].      Return in 9 months (on 9/27/2023) for Recheck with repeat xrays.    Nathaniel Kate MD

## 2023-02-21 ENCOUNTER — OFFICE VISIT (OUTPATIENT)
Dept: ORTHOPEDIC SURGERY | Facility: CLINIC | Age: 68
End: 2023-02-21
Payer: MEDICARE

## 2023-02-21 VITALS — WEIGHT: 202 LBS | HEIGHT: 66 IN | BODY MASS INDEX: 32.47 KG/M2

## 2023-02-21 DIAGNOSIS — M25.511 CHRONIC RIGHT SHOULDER PAIN: Primary | ICD-10-CM

## 2023-02-21 DIAGNOSIS — G89.29 CHRONIC RIGHT SHOULDER PAIN: Primary | ICD-10-CM

## 2023-02-21 DIAGNOSIS — M75.101 ROTATOR CUFF SYNDROME OF RIGHT SHOULDER: ICD-10-CM

## 2023-02-21 DIAGNOSIS — M24.111 DEGENERATIVE TEAR OF GLENOID LABRUM OF RIGHT SHOULDER: ICD-10-CM

## 2023-02-21 DIAGNOSIS — M75.121 NONTRAUMATIC COMPLETE TEAR OF RIGHT ROTATOR CUFF: ICD-10-CM

## 2023-02-21 DIAGNOSIS — M75.41 IMPINGEMENT SYNDROME OF RIGHT SHOULDER: ICD-10-CM

## 2023-02-21 DIAGNOSIS — M19.011 PRIMARY OSTEOARTHRITIS OF RIGHT SHOULDER: ICD-10-CM

## 2023-02-21 PROCEDURE — 99213 OFFICE O/P EST LOW 20 MIN: CPT | Performed by: NURSE PRACTITIONER

## 2023-02-21 PROCEDURE — 20610 DRAIN/INJ JOINT/BURSA W/O US: CPT | Performed by: NURSE PRACTITIONER

## 2023-02-21 RX ADMIN — LIDOCAINE HYDROCHLORIDE 2 ML: 10 INJECTION, SOLUTION INFILTRATION; PERINEURAL at 14:42

## 2023-02-21 RX ADMIN — TRIAMCINOLONE ACETONIDE 40 MG: 40 INJECTION, SUSPENSION INTRA-ARTICULAR; INTRAMUSCULAR at 14:42

## 2023-02-21 NOTE — PROGRESS NOTES
"Sam Infante is a 67 y.o. male returns for     Chief Complaint   Patient presents with   • Right Shoulder - Follow-up, Pain     HISTORY OF PRESENT ILLNESS: Patient presents to office for follow-up of chronic right shoulder pain.  Initial onset of pain occurred several years ago and has progressively worsened over time.  Patient established care with orthopedics for right shoulder pain in December 2019 and was evaluated per Dr. Kate at that time.  Patient underwent MRI imaging, which showed chronic full-thickness rotator cuff tears of both the supraspinatus and infraspinatus tendons with associated muscle atrophy.  Patient has a history of a remote right shoulder injury several years ago and a possible reinjury that occurred in May 2019.  The mechanisms of injury are unknown at this time.  Patient reports that his right shoulder pain has been gradually worsening.  He has not sustained any recent injuries to the right shoulder.  Pain is described as aching and stabbing in nature with limited range of motion.  Patient reports pain is worse with movement/use of his right arm/shoulder, especially for exertional activity and overhead activity.  Patient also has experienced nighttime pain that disrupts his sleep.  Patient is requesting an injection in his right shoulder today in an effort to improve his pain. X-rays of the right shoulder performed in office today.  Current pain scale is 3/10.    CONCURRENT MEDICAL HISTORY:    The following portions of the patient's history were reviewed and updated as appropriate: allergies, current medications, past family history, past medical history, past social history, past surgical history and problem list.     ROS  No fevers or chills.  No chest pain or shortness of air.  No GI or  disturbances. Right shoulder pain.     PHYSICAL EXAMINATION:       Ht 167.6 cm (66\")   Wt 91.6 kg (202 lb)   BMI 32.60 kg/m²     Physical Exam  Vitals reviewed.   Constitutional:       " General: He is not in acute distress.     Appearance: He is well-developed. He is not ill-appearing.   HENT:      Head: Normocephalic.   Pulmonary:      Effort: Pulmonary effort is normal. No respiratory distress.   Abdominal:      General: There is no distension.      Palpations: Abdomen is soft.   Musculoskeletal:         General: Tenderness (Mild, right shoulder) present. No swelling, deformity or signs of injury.   Skin:     General: Skin is warm and dry.      Capillary Refill: Capillary refill takes less than 2 seconds.      Findings: No erythema.   Neurological:      Mental Status: He is alert and oriented to person, place, and time.      GCS: GCS eye subscore is 4. GCS verbal subscore is 5. GCS motor subscore is 6.   Psychiatric:         Speech: Speech normal.         Behavior: Behavior normal.         Thought Content: Thought content normal.         Judgment: Judgment normal.         GAIT:     [x]  Normal  []  Antalgic    Assistive device: [x]  None  []  Walker     []  Crutches  []  Cane     []  Wheelchair  []  Stretcher    Right Shoulder Exam     Tenderness   Right shoulder tenderness location: Mild, diffuse.    Range of Motion   Active abduction: 120   External rotation: abnormal   Forward flexion: 150   Internal rotation 90 degrees: abnormal     Muscle Strength   Abduction: 4/5   Supraspinatus: 4/5     Tests   Meier test: positive  Cross arm: positive  Impingement: positive  Drop arm: negative    Other   Erythema: absent  Sensation: normal  Pulse: present    Comments:  Pain and limitations with range of motion.  No swelling appreciated.  No warmth or erythema.  No signs of infection noted.  Empty can test positive.  Full can test positive.  Neurovascularly intact.            XR Shoulder 2+ View Right    Result Date: 2/23/2023  Narrative: EXAM DESCRIPTION:  XR SHOULDER 2 OR MORE VIEWS CLINICAL INDICATION: pain, M25.511 Pain in right shoulder COMPARISON: None FINDINGS: The right shoulder is negative for  acute fracture. No dislocation. Hypertrophic degenerative arthritis about the acromioclavicular and glenohumeral joint. There is partial loss of glenohumeral joint space. No periarticular calcification.     Impression: Moderate degenerative arthritis involving the right shoulder without acute bony abnormality. Electronically signed by:  Severino Jimenez MD  2/23/2023 2:11 PM CST Workstation: [id number]    MRI right shoulder      History:  Chronic right shoulder pain.     Prior exam: Right shoulder September 13, 2019     TECHNIQUE: Multiplanar multisequence noncontrast images.     Large full-thickness rotator cuff tear involving the entire  supraspinatous and intraspinous. There is muscular tendinous  retraction and there is both supraspinatous and infraspinatus  muscle atrophy. The size of the gap in lateral to medial  projection 4.44 cm. The size of the gap in AP projection 4.64 cm.  Subscapularis is abnormal demonstrating heterogeneity and laxity.  This indicates tendinosis or partial-thickness tear.     There is slight upward migration of the humeral head with respect  to glenoid often seen in long-standing complete rotator cuff  tears.     There is acromioclavicular joint arthrosis but there is no  appreciable impingement.     Biceps tendon is absent within the intertubercular sulcus  indicating biceps tendon disruption and retraction. Subtle  degenerative type tear superior labrum.     IMPRESSION:  CONCLUSION: Large full-thickness rotator cuff tear involving the  entire supraspinatous and infraspinatus tendons. There is  muscular tendinous retraction and there is significant  supraspinatous and infraspinatus muscle atrophy.     Abnormal subscapularis demonstrating heterogeneity and laxity  suggesting either tendinosis or partial-thickness interstitial  tear.     Acromioclavicular joint arthrosis without underlying impingement.  Subtle degenerative type tears superior labrum.     The biceps tendon is absent within  the intertubercular sulcus,  therefore  indicating biceps tendon disruption and retraction.     Electronically signed by:  Sav Ann MD  12/24/2019 10:33 AM  CST Workstation: [id number]      ASSESSMENT:    Diagnoses and all orders for this visit:    Chronic right shoulder pain  -     Large Joint Arthrocentesis: R subacromial bursa  -     XR Shoulder 2+ View Right    Nontraumatic complete tear of right rotator cuff  -     Large Joint Arthrocentesis: R subacromial bursa    Degenerative tear of glenoid labrum of right shoulder  -     Large Joint Arthrocentesis: R subacromial bursa    Primary osteoarthritis of right shoulder  -     Large Joint Arthrocentesis: R subacromial bursa    Rotator cuff syndrome of right shoulder  -     Large Joint Arthrocentesis: R subacromial bursa    Impingement syndrome of right shoulder  -     Large Joint Arthrocentesis: R subacromial bursa    PLAN    Large Joint Arthrocentesis: R subacromial bursa  Date/Time: 2/21/2023 2:42 PM  Consent given by: patient  Timeout: Immediately prior to procedure a time out was called to verify the correct patient, procedure, equipment, support staff and site/side marked as required   Supporting Documentation  Indications: pain and diagnostic evaluation   Procedure Details  Location: shoulder - R subacromial bursa  Preparation: Patient was prepped and draped in the usual sterile fashion  Needle size: 22 G  Approach: posterior  Medications administered: 40 mg triamcinolone acetonide 40 MG/ML; 2 mL lidocaine 1 %  Patient tolerance: patient tolerated the procedure well with no immediate complications      X-rays of the right shoulder performed in office today and reviewed with no acute findings noted.  Patient has moderate osteoarthritic changes of both the before meals and glenohumeral joints.  Patient complains of chronic right shoulder pain, which has been an ongoing issue for several years, and has been progressively worsening in recent months.  Patient  underwent previous MRI imaging of the right shoulder in December 2019, which showed large full-thickness rotator cuff tears of both the supraspinatus and infraspinatus tendons with associated muscle atrophy as well as some degenerative labral tearing.  The patient has not been seen in office for right shoulder pain since January 2020.  Patient reports his pain has worsened in recent months and he is requesting to proceed with a localized injection of steroid today in an effort to improve his pain.  We discussed continued conservative management versus surgical consult.  It would be anticipated that this patient's only surgical option would be reverse total shoulder arthroplasty.  At this time, the patient wants to continue with conservative care.  Recommend proceeding today with a subacromial injection of steroid to left shoulder for management of pain/inflammation.    Recommend the following:     -Rest and activity modification with avoidance of heavy lifting, pulling, tugging and repetitive motions for now.  -Gentle, progressive range of motion exercises with the affected shoulder intermittently several times daily as pain allows.  -Ice therapy to the affected shoulder intermittently 3-4 times daily for 15 minutes at a time to minimize pain/inflammation.  -Gradual progression of activity and use of the affected arm/shoulder as tolerated and based on pain/symptoms.  -Continue with Meloxicam 15 mg daily as needed for control of joint pain/inflammation.  Patient can also take Tylenol as needed for additional pain control.    Follow-up in 4 to 6 weeks for recheck as needed for any new, worsening or persistent symptoms.  Consider referral to physical therapy if his pain/symptoms persist.  Consider surgical consult if his pain is not manageable with conservative treatment efforts.    Time spent of a minimum of 20 minutes including the face to face evaluation, reviewing of medical history and prior medial records,  reviewing of diagnostic studies, documentation, patient education and coordination of care.     EMR Dragon/Transciption Disclaimer: Some of this note may be an electronic transcription/translation of spoken language to printed text using the Dragon Dictation System.     Return in about 4 weeks (around 3/21/2023), or if symptoms worsen or fail to improve, for Recheck.        This document has been electronically signed by JOANIE Ojeda on February 26, 2023 12:55 CST        JOANIE Ojeda

## 2023-02-26 PROBLEM — M75.121 NONTRAUMATIC COMPLETE TEAR OF RIGHT ROTATOR CUFF: Status: ACTIVE | Noted: 2023-02-26

## 2023-02-26 PROBLEM — M24.111 DEGENERATIVE TEAR OF GLENOID LABRUM OF RIGHT SHOULDER: Status: ACTIVE | Noted: 2023-02-26

## 2023-02-26 PROBLEM — M19.011 PRIMARY OSTEOARTHRITIS OF RIGHT SHOULDER: Status: ACTIVE | Noted: 2023-02-26

## 2023-02-26 PROBLEM — M75.101 ROTATOR CUFF SYNDROME OF RIGHT SHOULDER: Status: ACTIVE | Noted: 2023-02-26

## 2023-02-26 RX ORDER — LIDOCAINE HYDROCHLORIDE 10 MG/ML
2 INJECTION, SOLUTION INFILTRATION; PERINEURAL
Status: COMPLETED | OUTPATIENT
Start: 2023-02-21 | End: 2023-02-21

## 2023-02-26 RX ORDER — TRIAMCINOLONE ACETONIDE 40 MG/ML
40 INJECTION, SUSPENSION INTRA-ARTICULAR; INTRAMUSCULAR
Status: COMPLETED | OUTPATIENT
Start: 2023-02-21 | End: 2023-02-21

## 2023-03-30 ENCOUNTER — TELEPHONE (OUTPATIENT)
Dept: ORTHOPEDIC SURGERY | Facility: CLINIC | Age: 68
End: 2023-03-30

## 2023-03-30 NOTE — TELEPHONE ENCOUNTER
Humble    Patient had total knee right  9/19/23, he called and said when he walks it pops.  He said it does not hurt but just pops.  Wanting to check if this is normal

## 2023-04-26 RX ORDER — MELOXICAM 15 MG/1
TABLET ORAL
Qty: 30 TABLET | Refills: 3 | Status: SHIPPED | OUTPATIENT
Start: 2023-04-26

## 2023-04-26 NOTE — TELEPHONE ENCOUNTER
Patient called requesting a refill of   meloxicam (mobic) 15mg  Last filled 12/27/2022 with 3 refills     Patient has switched pharmacy's to [facility] here in [place]   (pharmacy has been updated)

## 2023-06-21 PROBLEM — M79.641 RIGHT HAND PAIN: Status: ACTIVE | Noted: 2023-06-21

## 2023-06-21 PROBLEM — M19.049 HAND ARTHRITIS: Status: ACTIVE | Noted: 2023-06-21

## 2023-07-31 RX ORDER — MELOXICAM 15 MG/1
TABLET ORAL
Qty: 90 TABLET | Refills: 2 | Status: SHIPPED | OUTPATIENT
Start: 2023-07-31

## 2023-09-07 DIAGNOSIS — Z96.651 S/P TKR (TOTAL KNEE REPLACEMENT), RIGHT: Primary | ICD-10-CM

## 2023-09-12 ENCOUNTER — OFFICE VISIT (OUTPATIENT)
Dept: ORTHOPEDIC SURGERY | Facility: CLINIC | Age: 68
End: 2023-09-12
Payer: MEDICARE

## 2023-09-12 VITALS — BODY MASS INDEX: 31.95 KG/M2 | WEIGHT: 198.8 LBS | HEIGHT: 66 IN

## 2023-09-12 DIAGNOSIS — Z96.651 PRESENCE OF ARTIFICIAL KNEE JOINT, RIGHT: Primary | ICD-10-CM

## 2023-09-12 DIAGNOSIS — I10 ESSENTIAL HYPERTENSION: ICD-10-CM

## 2023-09-12 DIAGNOSIS — M17.12 PRIMARY OSTEOARTHRITIS OF LEFT KNEE: ICD-10-CM

## 2023-09-12 DIAGNOSIS — K21.9 GASTROESOPHAGEAL REFLUX DISEASE WITHOUT ESOPHAGITIS: ICD-10-CM

## 2023-09-12 PROCEDURE — 99213 OFFICE O/P EST LOW 20 MIN: CPT | Performed by: ORTHOPAEDIC SURGERY

## 2023-09-12 PROCEDURE — 1159F MED LIST DOCD IN RCRD: CPT | Performed by: ORTHOPAEDIC SURGERY

## 2023-09-12 PROCEDURE — 1160F RVW MEDS BY RX/DR IN RCRD: CPT | Performed by: ORTHOPAEDIC SURGERY

## 2023-09-12 NOTE — PROGRESS NOTES
"Sam Infante is a 68 y.o. male returns for     Chief Complaint   Patient presents with    Right Knee - Follow-up       HISTORY OF PRESENT ILLNESS:  Patient is now approximately 1 year status post total knee arthroplasty on the right.  He is progressing very well.  He reports no new injury to his knee.  He is now more limited by his left knee.     CONCURRENT MEDICAL HISTORY:    The following portions of the patient's history were reviewed and updated as appropriate: allergies, current medications, past family history, past medical history, past social history, past surgical history and problem list.     ROS  No fevers or chills.  No chest pain or shortness of air.  No GI or  disturbances.    PHYSICAL EXAMINATION:       Ht 167.6 cm (66\")   Wt 90.2 kg (198 lb 12.8 oz)   BMI 32.09 kg/m²     Physical Exam  Constitutional:       General: He is not in acute distress.     Appearance: Normal appearance.   Pulmonary:      Effort: Pulmonary effort is normal. No respiratory distress.   Neurological:      Mental Status: He is alert and oriented to person, place, and time.       GAIT:     [x]  Normal  []  Antalgic    Assistive device: [x]  None  []  Walker     []  Crutches  []  Cane     []  Wheelchair  []  Stretcher    Right Knee Exam     Comments:  Range of motion 0 to 130 degrees.  Stable exam.  Incision is well-healed.  Minimal edema.  No erythema.      Left Knee Exam     Muscle Strength   The patient has normal left knee strength.    Tenderness   Left knee tenderness location: diffuse.    Range of Motion   Extension:  -5   Flexion:  120     Tests   Varus: negative Valgus: negative  Drawer:  Anterior - negative     Posterior - negative    Other   Sensation: normal  Pulse: present  Swelling: none    Comments:  Crepitation with motion  Mild pain through arc of motion            XR Knee 1 or 2 View Right    Result Date: 9/12/2023  Narrative: Ordering Provider:  Nathaniel Kate MD Ordering Diagnosis/Indication: "  S/P TKR (total knee replacement), right Procedure:  XR KNEE 1 OR 2 VW RIGHT Exam Date:  9/12/23 COMPARISON:  Todays X-rays were compared to previous images dated December 27, 2022.     Impression:  AP bilateral standing of the knees with lateral of the right knee show acceptable position and alignment of right total knee arthroplasty.  No sign of implant loosening or failure is noted.  No interval changes in comparison to prior x-ray.  The left knee shows moderate to severe arthritic changes with 90% medial joint space collapse and moderate varus positioning.  Mild lateral subluxation of the tibia is also noted.  Mild progressive worsening of the arthritic change in the left knee noted in comparison to prior x-ray. Nathaniel Kate MD 9/12/23           ASSESSMENT:    Diagnoses and all orders for this visit:    Presence of artificial knee joint, right    Essential hypertension    Gastroesophageal reflux disease without esophagitis    Primary osteoarthritis of left knee          PLAN    He can progress activity as tolerated in regards to his right knee.  No true restrictions.  Continue general strengthening conditioning.    In regards to his left knee, we began the discussion for the knee arthritis treatment algorithm.  We discussed the possibility of home exercises which she was encouraged to continue, steroid injection, viscosupplementation, and eventual total knee arthroplasty.  He will notify us when he is ready to proceed in the algorithm.    Return if symptoms worsen or fail to improve.    Nathaniel Kate MD

## 2023-12-10 NOTE — PAT
Chlorhexidine scrub given with instruction sheet. Instructions reviewed in PAT, understanding verbalized.    ERAS instructions given.    Dr. Pierce here to review EKG and speak with patient. No orders received, ok to proceed.   No indicators present

## (undated) DEVICE — HOOD WITH PEEL AWAY FACE SHIELD: Brand: T7PLUS

## (undated) DEVICE — GLV SURG SENSICARE POLYISPRN W/ALOE PF LF 6.5 GRN STRL

## (undated) DEVICE — BIOLOX DELTA CERAMIC FEMORAL HEAD 32MM DIA +1 12/14 TAPER
Type: IMPLANTABLE DEVICE | Site: HIP | Status: NON-FUNCTIONAL
Brand: BIOLOX DELTA
Removed: 2019-10-14

## (undated) DEVICE — NDL HYPO SFTY GLD 22G 1 1/2IN

## (undated) DEVICE — CATHETER,URETHRAL,REDRUBBER,STRL,16FR: Brand: MEDLINE

## (undated) DEVICE — 6617 IOBAN II PATIENT ISOLATION DRAPE 5/BX,4BX/CS: Brand: STERI-DRAPE™ IOBAN™ 2

## (undated) DEVICE — SOL IRR NACL 0.9PCT BT 1000ML

## (undated) DEVICE — SUT VIC 0 CT1 36IN J946H

## (undated) DEVICE — GLV SURG SIGNATURE ESSENTIAL PF LTX SZ7

## (undated) DEVICE — GLV SURG NEOPRENE SENSICARE PF SZ7.5 LF STRL

## (undated) DEVICE — PK KN TOTL LF 60

## (undated) DEVICE — GLV SURG SIGNATURE ESSENTIAL PF LTX SZ8

## (undated) DEVICE — SUT ETHIB 0/0 CT1 30IN X424H

## (undated) DEVICE — HOOD, PEEL-AWAY: Brand: FLYTE

## (undated) DEVICE — GOWN,PREVENTION PLUS,XLONG/XLARGE,STRL: Brand: MEDLINE

## (undated) DEVICE — STERILE POLYISOPRENE POWDER-FREE SURGICAL GLOVES WITH EMOLLIENT COATING: Brand: PROTEXIS

## (undated) DEVICE — ANTIBACTERIAL UNDYED BRAIDED (POLYGLACTIN 910), SYNTHETIC ABSORBABLE SUTURE: Brand: COATED VICRYL

## (undated) DEVICE — NO-SCRATCH ™ SMALL WHITNEY CURETTE ™ IS A SINGLE-USE, PLASTIC CURETTE FOR QUICKLY APPLYING, MANIPULATING AND REMOVING BONE CEMENT DURING HIP AND KNEE REPLACEMENT SURGERY. THE PLASTIC IS SOFTER THAN STEEL INSTRUMENTS, REDUCING THE RISK OF DAMAGING THE PROSTHESIS WITH METAL INSTRUMENTS.  THE CURETTE’S 6MM TIP REMOVES EXCESS CEMENT FROM REPLACEMENT HIPS AND KNEES. EASY-TO-MANEUVER, THE SMALL BLUE CURETTE LETS YOU REMOVE CEMENT FROM ALL EDGES OF THE PROSTHESIS.NO-SCRATCH WHITNEY SMALL CURETTE FEATURES:SAFER THAN STEEL- MADE OF PLASTIC - STURDY YET SOFTER THAN SURGICAL STEEL.HANDIER- EACH TOOL HAS A MOLDED-IN THUMB INDENTATION INSTANTLY ORIENTING THE TOOL.- EASIER TO MANEUVER IN HARD TO SEE PLACES.- COLOR-CODED FOR EASY IDENTIFICATION.FASTER- COMES INDIVIDUALLY PACKAGED IN STERILE, PEEL OPEN POUCH, READY TO GO.- APPLIES, MANIPULATES, OR REMOVES CEMENT WITH FINGERTIP PRECISION.ECONOMICAL- THE COST OF A SINGLE REVISION DWARFS THE COST OF A SINGLE-USE CURETTE. - DISPOSABLE – THERE’S NO NEED TO WASTE TIME REMOVING HARDENED CEMENT OR RE-STERILIZING TOOLS.- LESS EXPENSIVE TO BUY AND INVENTORY - ORDER ONLY THE TOOL YOU USE.- PACKAGED 25 INDIVIDUALLY WRAPPED TOOLS TO A CARTON FOR CONVENIENT SHELF STORAGE.: Brand: WHITNEY NO-SCRATCH CURETTE (SMALL)

## (undated) DEVICE — TBG PENCL TELESCP MEGADYNE SMOKE EVAC 10FT

## (undated) DEVICE — TRAY,FOLEY INSERTION,PVP,10ML SYRINGE: Brand: MEDLINE

## (undated) DEVICE — DRSNG GZ CURAD XEROFORM NONADHS 5X9IN STRL

## (undated) DEVICE — SPNG GZ WOVN 4X4IN 12PLY 10/BX STRL

## (undated) DEVICE — PK HIP ANT LF 60

## (undated) DEVICE — PAD,ABDOMINAL,8"X10",ST,LF: Brand: MEDLINE

## (undated) DEVICE — SOL IRR NACL 0.9PCT 3000ML

## (undated) DEVICE — SPNG LAP 18X18IN LF STRL PK/5

## (undated) DEVICE — DISPOSABLE TOURNIQUET CUFF SINGLE BLADDER, DUAL PORT AND QUICK CONNECT CONNECTOR: Brand: COLOR CUFF

## (undated) DEVICE — SOL IRR H2O BG 1000ML STRL

## (undated) DEVICE — PROXIMATE SKIN STAPLERS (35 WIDE) CONTAINS 35 STAINLESS STEEL STAPLES (FIXED HEAD): Brand: PROXIMATE

## (undated) DEVICE — DUAL CUT SAGITTAL BLADE

## (undated) DEVICE — GLV SURG TRIUMPH PF LTX 7 STRL

## (undated) DEVICE — BNDG ELAS CO-FLEX SLF ADHR 4IN5YD LF STRL

## (undated) DEVICE — DRSNG WND BORDR/ADHS NONADHR/GZ LF 4X10IN STRL

## (undated) DEVICE — STRYKER PERFORMANCE SERIES SAGITTAL BLADE: Brand: STRYKER PERFORMANCE SERIES

## (undated) DEVICE — SYS SKIN CLS DERMABOND PRINEO W/22CM MESH TP

## (undated) DEVICE — GLV SURG TRIUMPH LT PF LTX 8 STRL

## (undated) DEVICE — RECIPROCATING BLADE, DOUBLE SIDED, OFFSET  (70.0 X 1.0 X 12.5MM)

## (undated) DEVICE — UNDRPD BREATH 23X36 BG/10

## (undated) DEVICE — GLV SURG TRIUMPH LT PF LTX 8.5 STRL

## (undated) DEVICE — SYR LUERLOK 20CC BX/50

## (undated) DEVICE — GOWN,AURORA,NOREINF,RAGLAN,XL,STERILE: Brand: MEDLINE

## (undated) DEVICE — GLV SURG SENSICARE PI LF PF 7.5 GRN STRL

## (undated) DEVICE — SUT ETHIB 0 CT1 CR8 18IN CX21D

## (undated) DEVICE — PATIENT RETURN ELECTRODE, SINGLE-USE, CONTACT QUALITY MONITORING, ADULT, WITH 9FT CORD, FOR PATIENTS WEIGING OVER 33LBS. (15KG): Brand: MEGADYNE